# Patient Record
Sex: FEMALE | Race: WHITE | Employment: OTHER | URBAN - METROPOLITAN AREA
[De-identification: names, ages, dates, MRNs, and addresses within clinical notes are randomized per-mention and may not be internally consistent; named-entity substitution may affect disease eponyms.]

---

## 2017-01-10 RX ORDER — PRAVASTATIN SODIUM 20 MG
TABLET ORAL
Qty: 30 TABLET | Refills: 5 | Status: SHIPPED | OUTPATIENT
Start: 2017-01-10 | End: 2017-09-18 | Stop reason: SDUPTHER

## 2017-02-16 ENCOUNTER — OFFICE VISIT (OUTPATIENT)
Dept: FAMILY MEDICINE CLINIC | Age: 82
End: 2017-02-16

## 2017-02-16 VITALS
WEIGHT: 155 LBS | BODY MASS INDEX: 28.52 KG/M2 | TEMPERATURE: 97.6 F | HEART RATE: 85 BPM | OXYGEN SATURATION: 96 % | SYSTOLIC BLOOD PRESSURE: 116 MMHG | DIASTOLIC BLOOD PRESSURE: 64 MMHG | HEIGHT: 62 IN

## 2017-02-16 DIAGNOSIS — K21.9 GERD WITHOUT ESOPHAGITIS: ICD-10-CM

## 2017-02-16 DIAGNOSIS — J01.30 ACUTE NON-RECURRENT SPHENOIDAL SINUSITIS: ICD-10-CM

## 2017-02-16 DIAGNOSIS — D62 ACUTE BLOOD LOSS ANEMIA: ICD-10-CM

## 2017-02-16 DIAGNOSIS — L25.9 CONTACT DERMATITIS, UNSPECIFIED CONTACT DERMATITIS TYPE, UNSPECIFIED TRIGGER: ICD-10-CM

## 2017-02-16 DIAGNOSIS — I25.10 ATHEROSCLEROSIS OF NATIVE CORONARY ARTERY OF NATIVE HEART WITHOUT ANGINA PECTORIS: Primary | ICD-10-CM

## 2017-02-16 DIAGNOSIS — M15.9 PRIMARY OSTEOARTHRITIS INVOLVING MULTIPLE JOINTS: ICD-10-CM

## 2017-02-16 DIAGNOSIS — I48.20 CHRONIC ATRIAL FIBRILLATION (HCC): ICD-10-CM

## 2017-02-16 DIAGNOSIS — E78.00 PURE HYPERCHOLESTEROLEMIA: ICD-10-CM

## 2017-02-16 LAB
ANION GAP SERPL CALCULATED.3IONS-SCNC: 14 MMOL/L (ref 3–16)
BASOPHILS ABSOLUTE: 0 K/UL (ref 0–0.2)
BASOPHILS RELATIVE PERCENT: 1 %
BUN BLDV-MCNC: 16 MG/DL (ref 7–20)
CALCIUM SERPL-MCNC: 9.1 MG/DL (ref 8.3–10.6)
CHLORIDE BLD-SCNC: 102 MMOL/L (ref 99–110)
CHOLESTEROL, TOTAL: 137 MG/DL (ref 0–199)
CO2: 26 MMOL/L (ref 21–32)
CREAT SERPL-MCNC: 0.8 MG/DL (ref 0.6–1.2)
EOSINOPHILS ABSOLUTE: 0.2 K/UL (ref 0–0.6)
EOSINOPHILS RELATIVE PERCENT: 4 %
GFR AFRICAN AMERICAN: >60
GFR NON-AFRICAN AMERICAN: >60
GLUCOSE BLD-MCNC: 105 MG/DL (ref 70–99)
HCT VFR BLD CALC: 39.4 % (ref 36–48)
HDLC SERPL-MCNC: 73 MG/DL (ref 40–60)
HEMOGLOBIN: 12.8 G/DL (ref 12–16)
LDL CHOLESTEROL CALCULATED: 51 MG/DL
LYMPHOCYTES ABSOLUTE: 0.6 K/UL (ref 1–5.1)
LYMPHOCYTES RELATIVE PERCENT: 13.3 %
MCH RBC QN AUTO: 29.8 PG (ref 26–34)
MCHC RBC AUTO-ENTMCNC: 32.3 G/DL (ref 31–36)
MCV RBC AUTO: 92.2 FL (ref 80–100)
MONOCYTES ABSOLUTE: 0.4 K/UL (ref 0–1.3)
MONOCYTES RELATIVE PERCENT: 8.9 %
NEUTROPHILS ABSOLUTE: 3.6 K/UL (ref 1.7–7.7)
NEUTROPHILS RELATIVE PERCENT: 72.8 %
PDW BLD-RTO: 14.9 % (ref 12.4–15.4)
PLATELET # BLD: 174 K/UL (ref 135–450)
PMV BLD AUTO: 7.7 FL (ref 5–10.5)
POTASSIUM SERPL-SCNC: 4.2 MMOL/L (ref 3.5–5.1)
RBC # BLD: 4.27 M/UL (ref 4–5.2)
SODIUM BLD-SCNC: 142 MMOL/L (ref 136–145)
TRIGL SERPL-MCNC: 65 MG/DL (ref 0–150)
VLDLC SERPL CALC-MCNC: 13 MG/DL
WBC # BLD: 4.9 K/UL (ref 4–11)

## 2017-02-16 PROCEDURE — G8598 ASA/ANTIPLAT THER USED: HCPCS | Performed by: FAMILY MEDICINE

## 2017-02-16 PROCEDURE — 4040F PNEUMOC VAC/ADMIN/RCVD: CPT | Performed by: FAMILY MEDICINE

## 2017-02-16 PROCEDURE — 1036F TOBACCO NON-USER: CPT | Performed by: FAMILY MEDICINE

## 2017-02-16 PROCEDURE — 36415 COLL VENOUS BLD VENIPUNCTURE: CPT | Performed by: FAMILY MEDICINE

## 2017-02-16 PROCEDURE — G8484 FLU IMMUNIZE NO ADMIN: HCPCS | Performed by: FAMILY MEDICINE

## 2017-02-16 PROCEDURE — 1090F PRES/ABSN URINE INCON ASSESS: CPT | Performed by: FAMILY MEDICINE

## 2017-02-16 PROCEDURE — 1123F ACP DISCUSS/DSCN MKR DOCD: CPT | Performed by: FAMILY MEDICINE

## 2017-02-16 PROCEDURE — 99214 OFFICE O/P EST MOD 30 MIN: CPT | Performed by: FAMILY MEDICINE

## 2017-02-16 PROCEDURE — G8420 CALC BMI NORM PARAMETERS: HCPCS | Performed by: FAMILY MEDICINE

## 2017-02-16 PROCEDURE — G8427 DOCREV CUR MEDS BY ELIG CLIN: HCPCS | Performed by: FAMILY MEDICINE

## 2017-02-16 RX ORDER — AZITHROMYCIN 250 MG/1
TABLET, FILM COATED ORAL
Qty: 1 PACKET | Refills: 0 | Status: SHIPPED | OUTPATIENT
Start: 2017-02-16 | End: 2017-02-26

## 2017-02-16 RX ORDER — MOMETASONE FUROATE 1 MG/G
CREAM TOPICAL
Qty: 30 G | Refills: 2 | Status: SHIPPED | OUTPATIENT
Start: 2017-02-16 | End: 2017-10-18 | Stop reason: ALTCHOICE

## 2017-02-16 RX ORDER — M-VIT,TX,IRON,MINS/CALC/FOLIC 27MG-0.4MG
1 TABLET ORAL DAILY
COMMUNITY
End: 2017-06-19

## 2017-03-27 RX ORDER — OMEPRAZOLE 20 MG/1
CAPSULE, DELAYED RELEASE ORAL
Qty: 60 CAPSULE | Refills: 5 | Status: SHIPPED | OUTPATIENT
Start: 2017-03-27 | End: 2017-10-26

## 2017-03-29 ENCOUNTER — OFFICE VISIT (OUTPATIENT)
Dept: CARDIOLOGY CLINIC | Age: 82
End: 2017-03-29

## 2017-03-29 ENCOUNTER — OFFICE VISIT (OUTPATIENT)
Dept: ORTHOPEDIC SURGERY | Age: 82
End: 2017-03-29

## 2017-03-29 VITALS
HEIGHT: 62 IN | OXYGEN SATURATION: 95 % | DIASTOLIC BLOOD PRESSURE: 74 MMHG | BODY MASS INDEX: 28.71 KG/M2 | SYSTOLIC BLOOD PRESSURE: 116 MMHG | WEIGHT: 156 LBS | HEART RATE: 101 BPM

## 2017-03-29 VITALS
DIASTOLIC BLOOD PRESSURE: 70 MMHG | WEIGHT: 154.98 LBS | SYSTOLIC BLOOD PRESSURE: 112 MMHG | HEIGHT: 62 IN | BODY MASS INDEX: 28.52 KG/M2 | HEART RATE: 69 BPM

## 2017-03-29 DIAGNOSIS — I48.20 CHRONIC ATRIAL FIBRILLATION (HCC): Primary | ICD-10-CM

## 2017-03-29 DIAGNOSIS — M17.11 PRIMARY OSTEOARTHRITIS OF RIGHT KNEE: Primary | ICD-10-CM

## 2017-03-29 DIAGNOSIS — G89.29 CHRONIC PAIN OF RIGHT KNEE: ICD-10-CM

## 2017-03-29 DIAGNOSIS — E78.00 PURE HYPERCHOLESTEROLEMIA: ICD-10-CM

## 2017-03-29 DIAGNOSIS — M25.561 CHRONIC PAIN OF RIGHT KNEE: ICD-10-CM

## 2017-03-29 PROCEDURE — 1090F PRES/ABSN URINE INCON ASSESS: CPT | Performed by: INTERNAL MEDICINE

## 2017-03-29 PROCEDURE — G8427 DOCREV CUR MEDS BY ELIG CLIN: HCPCS | Performed by: ORTHOPAEDIC SURGERY

## 2017-03-29 PROCEDURE — 4040F PNEUMOC VAC/ADMIN/RCVD: CPT | Performed by: ORTHOPAEDIC SURGERY

## 2017-03-29 PROCEDURE — 1123F ACP DISCUSS/DSCN MKR DOCD: CPT | Performed by: ORTHOPAEDIC SURGERY

## 2017-03-29 PROCEDURE — 99213 OFFICE O/P EST LOW 20 MIN: CPT | Performed by: ORTHOPAEDIC SURGERY

## 2017-03-29 PROCEDURE — 1036F TOBACCO NON-USER: CPT | Performed by: INTERNAL MEDICINE

## 2017-03-29 PROCEDURE — G8484 FLU IMMUNIZE NO ADMIN: HCPCS | Performed by: INTERNAL MEDICINE

## 2017-03-29 PROCEDURE — 1123F ACP DISCUSS/DSCN MKR DOCD: CPT | Performed by: INTERNAL MEDICINE

## 2017-03-29 PROCEDURE — G8420 CALC BMI NORM PARAMETERS: HCPCS | Performed by: ORTHOPAEDIC SURGERY

## 2017-03-29 PROCEDURE — G8427 DOCREV CUR MEDS BY ELIG CLIN: HCPCS | Performed by: INTERNAL MEDICINE

## 2017-03-29 PROCEDURE — G8420 CALC BMI NORM PARAMETERS: HCPCS | Performed by: INTERNAL MEDICINE

## 2017-03-29 PROCEDURE — 20610 DRAIN/INJ JOINT/BURSA W/O US: CPT | Performed by: ORTHOPAEDIC SURGERY

## 2017-03-29 PROCEDURE — 1090F PRES/ABSN URINE INCON ASSESS: CPT | Performed by: ORTHOPAEDIC SURGERY

## 2017-03-29 PROCEDURE — 99214 OFFICE O/P EST MOD 30 MIN: CPT | Performed by: INTERNAL MEDICINE

## 2017-03-29 PROCEDURE — 1036F TOBACCO NON-USER: CPT | Performed by: ORTHOPAEDIC SURGERY

## 2017-03-29 PROCEDURE — G8598 ASA/ANTIPLAT THER USED: HCPCS | Performed by: INTERNAL MEDICINE

## 2017-03-29 PROCEDURE — G8484 FLU IMMUNIZE NO ADMIN: HCPCS | Performed by: ORTHOPAEDIC SURGERY

## 2017-03-29 PROCEDURE — 4040F PNEUMOC VAC/ADMIN/RCVD: CPT | Performed by: INTERNAL MEDICINE

## 2017-03-29 PROCEDURE — G8598 ASA/ANTIPLAT THER USED: HCPCS | Performed by: ORTHOPAEDIC SURGERY

## 2017-05-01 ENCOUNTER — TELEPHONE (OUTPATIENT)
Dept: CARDIOLOGY CLINIC | Age: 82
End: 2017-05-01

## 2017-06-12 ENCOUNTER — TELEPHONE (OUTPATIENT)
Dept: CARDIOLOGY CLINIC | Age: 82
End: 2017-06-12

## 2017-06-21 ENCOUNTER — OFFICE VISIT (OUTPATIENT)
Dept: FAMILY MEDICINE CLINIC | Age: 82
End: 2017-06-21

## 2017-06-21 VITALS
HEIGHT: 62 IN | BODY MASS INDEX: 28.89 KG/M2 | HEART RATE: 66 BPM | WEIGHT: 157 LBS | OXYGEN SATURATION: 98 % | SYSTOLIC BLOOD PRESSURE: 118 MMHG | TEMPERATURE: 97.7 F | DIASTOLIC BLOOD PRESSURE: 66 MMHG

## 2017-06-21 DIAGNOSIS — I48.20 CHRONIC ATRIAL FIBRILLATION (HCC): Primary | ICD-10-CM

## 2017-06-21 DIAGNOSIS — J43.1 PANLOBULAR EMPHYSEMA (HCC): ICD-10-CM

## 2017-06-21 DIAGNOSIS — I25.10 ATHEROSCLEROSIS OF NATIVE CORONARY ARTERY OF NATIVE HEART WITHOUT ANGINA PECTORIS: ICD-10-CM

## 2017-06-21 DIAGNOSIS — M15.9 PRIMARY OSTEOARTHRITIS INVOLVING MULTIPLE JOINTS: ICD-10-CM

## 2017-06-21 DIAGNOSIS — K21.9 GERD WITHOUT ESOPHAGITIS: ICD-10-CM

## 2017-06-21 LAB
ANION GAP SERPL CALCULATED.3IONS-SCNC: 14 MMOL/L (ref 3–16)
BASOPHILS ABSOLUTE: 0 K/UL (ref 0–0.2)
BASOPHILS RELATIVE PERCENT: 1.1 %
BUN BLDV-MCNC: 15 MG/DL (ref 7–20)
CALCIUM SERPL-MCNC: 9 MG/DL (ref 8.3–10.6)
CHLORIDE BLD-SCNC: 103 MMOL/L (ref 99–110)
CO2: 27 MMOL/L (ref 21–32)
CREAT SERPL-MCNC: 0.7 MG/DL (ref 0.6–1.2)
EOSINOPHILS ABSOLUTE: 0.2 K/UL (ref 0–0.6)
EOSINOPHILS RELATIVE PERCENT: 5.9 %
GFR AFRICAN AMERICAN: >60
GFR NON-AFRICAN AMERICAN: >60
GLUCOSE BLD-MCNC: 96 MG/DL (ref 70–99)
HCT VFR BLD CALC: 37.7 % (ref 36–48)
HEMOGLOBIN: 12.1 G/DL (ref 12–16)
LYMPHOCYTES ABSOLUTE: 0.6 K/UL (ref 1–5.1)
LYMPHOCYTES RELATIVE PERCENT: 14.7 %
MCH RBC QN AUTO: 29.2 PG (ref 26–34)
MCHC RBC AUTO-ENTMCNC: 32.1 G/DL (ref 31–36)
MCV RBC AUTO: 91.1 FL (ref 80–100)
MONOCYTES ABSOLUTE: 0.4 K/UL (ref 0–1.3)
MONOCYTES RELATIVE PERCENT: 9 %
NEUTROPHILS ABSOLUTE: 2.9 K/UL (ref 1.7–7.7)
NEUTROPHILS RELATIVE PERCENT: 69.3 %
PDW BLD-RTO: 14 % (ref 12.4–15.4)
PLATELET # BLD: 186 K/UL (ref 135–450)
PMV BLD AUTO: 8.1 FL (ref 5–10.5)
POTASSIUM SERPL-SCNC: 4.2 MMOL/L (ref 3.5–5.1)
RBC # BLD: 4.14 M/UL (ref 4–5.2)
SODIUM BLD-SCNC: 144 MMOL/L (ref 136–145)
TSH REFLEX: 1.64 UIU/ML (ref 0.27–4.2)
WBC # BLD: 4.2 K/UL (ref 4–11)

## 2017-06-21 PROCEDURE — G8598 ASA/ANTIPLAT THER USED: HCPCS | Performed by: FAMILY MEDICINE

## 2017-06-21 PROCEDURE — 99214 OFFICE O/P EST MOD 30 MIN: CPT | Performed by: FAMILY MEDICINE

## 2017-06-21 PROCEDURE — 1036F TOBACCO NON-USER: CPT | Performed by: FAMILY MEDICINE

## 2017-06-21 PROCEDURE — 1090F PRES/ABSN URINE INCON ASSESS: CPT | Performed by: FAMILY MEDICINE

## 2017-06-21 PROCEDURE — G8427 DOCREV CUR MEDS BY ELIG CLIN: HCPCS | Performed by: FAMILY MEDICINE

## 2017-06-21 PROCEDURE — 1123F ACP DISCUSS/DSCN MKR DOCD: CPT | Performed by: FAMILY MEDICINE

## 2017-06-21 PROCEDURE — 4040F PNEUMOC VAC/ADMIN/RCVD: CPT | Performed by: FAMILY MEDICINE

## 2017-06-21 PROCEDURE — G8926 SPIRO NO PERF OR DOC: HCPCS | Performed by: FAMILY MEDICINE

## 2017-06-21 PROCEDURE — 36415 COLL VENOUS BLD VENIPUNCTURE: CPT | Performed by: FAMILY MEDICINE

## 2017-06-21 PROCEDURE — 3023F SPIROM DOC REV: CPT | Performed by: FAMILY MEDICINE

## 2017-06-21 PROCEDURE — G8419 CALC BMI OUT NRM PARAM NOF/U: HCPCS | Performed by: FAMILY MEDICINE

## 2017-07-24 ENCOUNTER — OFFICE VISIT (OUTPATIENT)
Dept: FAMILY MEDICINE CLINIC | Age: 82
End: 2017-07-24

## 2017-07-24 VITALS
DIASTOLIC BLOOD PRESSURE: 64 MMHG | BODY MASS INDEX: 29.63 KG/M2 | OXYGEN SATURATION: 95 % | HEART RATE: 72 BPM | SYSTOLIC BLOOD PRESSURE: 116 MMHG | TEMPERATURE: 98.1 F | WEIGHT: 162 LBS

## 2017-07-24 DIAGNOSIS — R82.81 PYURIA: ICD-10-CM

## 2017-07-24 DIAGNOSIS — R10.9 FLANK PAIN: Primary | ICD-10-CM

## 2017-07-24 LAB
BILIRUBIN, POC: NEGATIVE
BLOOD URINE, POC: ABNORMAL
CLARITY, POC: CLEAR
COLOR, POC: ABNORMAL
GLUCOSE URINE, POC: NEGATIVE
KETONES, POC: NEGATIVE
LEUKOCYTE EST, POC: ABNORMAL
NITRITE, POC: NEGATIVE
PH, POC: 7
PROTEIN, POC: NEGATIVE
SPECIFIC GRAVITY, POC: 1.01
UROBILINOGEN, POC: 1

## 2017-07-24 PROCEDURE — G8427 DOCREV CUR MEDS BY ELIG CLIN: HCPCS | Performed by: FAMILY MEDICINE

## 2017-07-24 PROCEDURE — 1123F ACP DISCUSS/DSCN MKR DOCD: CPT | Performed by: FAMILY MEDICINE

## 2017-07-24 PROCEDURE — 99213 OFFICE O/P EST LOW 20 MIN: CPT | Performed by: FAMILY MEDICINE

## 2017-07-24 PROCEDURE — 81003 URINALYSIS AUTO W/O SCOPE: CPT | Performed by: FAMILY MEDICINE

## 2017-07-24 PROCEDURE — 1036F TOBACCO NON-USER: CPT | Performed by: FAMILY MEDICINE

## 2017-07-24 PROCEDURE — 4040F PNEUMOC VAC/ADMIN/RCVD: CPT | Performed by: FAMILY MEDICINE

## 2017-07-24 PROCEDURE — G8598 ASA/ANTIPLAT THER USED: HCPCS | Performed by: FAMILY MEDICINE

## 2017-07-24 PROCEDURE — G8419 CALC BMI OUT NRM PARAM NOF/U: HCPCS | Performed by: FAMILY MEDICINE

## 2017-07-24 PROCEDURE — 1090F PRES/ABSN URINE INCON ASSESS: CPT | Performed by: FAMILY MEDICINE

## 2017-07-24 RX ORDER — TIZANIDINE 4 MG/1
2-4 TABLET ORAL EVERY 6 HOURS PRN
Qty: 40 TABLET | Refills: 1 | Status: SHIPPED | OUTPATIENT
Start: 2017-07-24 | End: 2017-10-19 | Stop reason: ALTCHOICE

## 2017-07-25 ENCOUNTER — TELEPHONE (OUTPATIENT)
Dept: CARDIOLOGY CLINIC | Age: 82
End: 2017-07-25

## 2017-07-26 ENCOUNTER — TELEPHONE (OUTPATIENT)
Dept: FAMILY MEDICINE CLINIC | Age: 82
End: 2017-07-26

## 2017-07-26 LAB — URINE CULTURE, ROUTINE: NORMAL

## 2017-08-02 RX ORDER — NITROFURANTOIN 25; 75 MG/1; MG/1
100 CAPSULE ORAL 2 TIMES DAILY
Qty: 10 CAPSULE | Refills: 0 | Status: SHIPPED | OUTPATIENT
Start: 2017-08-02 | End: 2017-08-07

## 2017-08-09 ENCOUNTER — TELEPHONE (OUTPATIENT)
Dept: FAMILY MEDICINE CLINIC | Age: 82
End: 2017-08-09

## 2017-08-10 ENCOUNTER — NURSE ONLY (OUTPATIENT)
Dept: FAMILY MEDICINE CLINIC | Age: 82
End: 2017-08-10

## 2017-08-10 DIAGNOSIS — R32 URINARY INCONTINENCE, UNSPECIFIED TYPE: Primary | ICD-10-CM

## 2017-08-10 LAB
BILIRUBIN, POC: ABNORMAL
BLOOD URINE, POC: ABNORMAL
CLARITY, POC: CLEAR
COLOR, POC: ABNORMAL
GLUCOSE URINE, POC: ABNORMAL
KETONES, POC: ABNORMAL
LEUKOCYTE EST, POC: ABNORMAL
NITRITE, POC: ABNORMAL
PH, POC: 7
PROTEIN, POC: ABNORMAL
SPECIFIC GRAVITY, POC: 1.01
UROBILINOGEN, POC: 0.2

## 2017-08-10 PROCEDURE — 81002 URINALYSIS NONAUTO W/O SCOPE: CPT | Performed by: FAMILY MEDICINE

## 2017-08-10 RX ORDER — CIPROFLOXACIN 250 MG/1
250 TABLET, FILM COATED ORAL 2 TIMES DAILY
Qty: 14 TABLET | Refills: 0 | Status: SHIPPED | OUTPATIENT
Start: 2017-08-10 | End: 2017-08-17

## 2017-08-12 LAB — URINE CULTURE, ROUTINE: NORMAL

## 2017-08-30 ENCOUNTER — TELEPHONE (OUTPATIENT)
Dept: FAMILY MEDICINE CLINIC | Age: 82
End: 2017-08-30

## 2017-09-11 RX ORDER — FUROSEMIDE 20 MG/1
TABLET ORAL
Qty: 60 TABLET | Refills: 3 | Status: SHIPPED | OUTPATIENT
Start: 2017-09-11 | End: 2018-05-02 | Stop reason: SDUPTHER

## 2017-09-18 RX ORDER — PRAVASTATIN SODIUM 20 MG
TABLET ORAL
Qty: 30 TABLET | Refills: 5 | Status: SHIPPED | OUTPATIENT
Start: 2017-09-18 | End: 2018-05-02 | Stop reason: SDUPTHER

## 2017-10-18 ENCOUNTER — OFFICE VISIT (OUTPATIENT)
Dept: CARDIOLOGY CLINIC | Age: 82
End: 2017-10-18

## 2017-10-18 VITALS
HEIGHT: 62 IN | BODY MASS INDEX: 28.34 KG/M2 | DIASTOLIC BLOOD PRESSURE: 72 MMHG | HEART RATE: 80 BPM | OXYGEN SATURATION: 95 % | SYSTOLIC BLOOD PRESSURE: 110 MMHG | WEIGHT: 154 LBS

## 2017-10-18 DIAGNOSIS — I48.20 CHRONIC ATRIAL FIBRILLATION (HCC): ICD-10-CM

## 2017-10-18 DIAGNOSIS — I25.10 ATHEROSCLEROSIS OF NATIVE CORONARY ARTERY OF NATIVE HEART WITHOUT ANGINA PECTORIS: Primary | ICD-10-CM

## 2017-10-18 DIAGNOSIS — E78.00 PURE HYPERCHOLESTEROLEMIA: ICD-10-CM

## 2017-10-18 PROCEDURE — 1090F PRES/ABSN URINE INCON ASSESS: CPT | Performed by: INTERNAL MEDICINE

## 2017-10-18 PROCEDURE — G8417 CALC BMI ABV UP PARAM F/U: HCPCS | Performed by: INTERNAL MEDICINE

## 2017-10-18 PROCEDURE — G8484 FLU IMMUNIZE NO ADMIN: HCPCS | Performed by: INTERNAL MEDICINE

## 2017-10-18 PROCEDURE — 1036F TOBACCO NON-USER: CPT | Performed by: INTERNAL MEDICINE

## 2017-10-18 PROCEDURE — G8427 DOCREV CUR MEDS BY ELIG CLIN: HCPCS | Performed by: INTERNAL MEDICINE

## 2017-10-18 PROCEDURE — 4040F PNEUMOC VAC/ADMIN/RCVD: CPT | Performed by: INTERNAL MEDICINE

## 2017-10-18 PROCEDURE — 1123F ACP DISCUSS/DSCN MKR DOCD: CPT | Performed by: INTERNAL MEDICINE

## 2017-10-18 PROCEDURE — G8598 ASA/ANTIPLAT THER USED: HCPCS | Performed by: INTERNAL MEDICINE

## 2017-10-18 PROCEDURE — 99214 OFFICE O/P EST MOD 30 MIN: CPT | Performed by: INTERNAL MEDICINE

## 2017-10-18 NOTE — LETTER
[address] [facility] - [place]. [address] [facility] [address]  Phone: [phone]  Fax: [phone]    Luz Marina Ayala MD        October 18, 2017     Debra Paula, [address] [address]    Patient: Annamaria Jackson  MR Number: [id number]  YOB: 1926  Date of Visit: 10/18/2017    Dear Dr. Debra Paula:    Thank you for the request for consultation for Sveta Velazquez to me for the evaluation. Below are the relevant portions of my assessment and plan of care. [address]     Outpatient Follow Up Note    Subjective: Follow Up secondary to coronary artery disease, HTN and  Chronic atrial fibrillation  Today, she states she is doing well and denies  MIKE, dizziness, syncope, or edema. Reports to no recent episode of  \"heart flutterings\"   She does all her own chores without difficulty including house work. [place]:   She reports falling and fracturing her left hip back in January 7th 2016 and spent 2 months in rehab. She is currently living at home and she no longer works at [employer]. She is currently looking for a job. She states \"my only income is my social security and I have a lot more bills than that. \"    She has history of stable CAD chronic afib rate controlled anticoagulated on treatment with statins for hyperlipidemia.     12 point ROS negative in all areas as listed below except as in [address]  Constitutional, EENT, Cardiovascular, pulmonary, GI, , Musculoskeletal, skin, neurological, hematological, endocrine, Psychiatric    Past Medical History:   Diagnosis Date    Atherosclerosis of native coronary artery of native heart without angina pectoris     cath 1/12 , 4/12 (occluded RCa with patent collateral flow    Chronic atrial fibrillation ([address] 1/12    Closed left hip fracture ([address] 1/16    THR    DDD (degenerative disc disease), cervical     Emphysema of lung ([address] 2007    mild    GERD without esophagitis 2001    gerd with stricture skin, neurological, hematological, endocrine, Psychiatric      Objective:   PHYSICAL EXAM:        VITALS:    /72   Pulse 80   Ht 5' 2\" (1.575 m)   Wt 154 lb (69.9 kg)   SpO2 95%   BMI 28.17 kg/m²    CONSTITUTIONAL: Cooperative, no apparent distress, and appears well nourished / developed  NEUROLOGIC:  Awake and oriented to person, place and time. PSYCH: Calm affect. SKIN: Warm and dry. HEENT: Sclera non-icteric, normocephalic, neck supple, no elevation of JVP, normal carotid pulses with no bruits and thyroid normal size. LUNGS:  No increased work of breathing and clear to auscultation, no wheezing  CARDIOVASCULAR:  irregular rate, irregular rhythm with no murmurs, gallops, rubs, or abnormal heart sounds, normal PMI. The apical impulses not displaced  JVP less than 8 cm H2O  The carotid upstroke is normal in amplitude and contour without delay or bruit  JVP is not elevated  ABDOMEN:  Normal bowel sounds, non-distended and non-tender to palpation  EXT: no edema, no calf tenderness. Pulses are present bilaterally.     DATA:    Lab Results   Component Value Date    ALT 15 01/07/2016    AST 21 01/07/2016    ALKPHOS 65 01/07/2016    BILITOT 0.4 01/07/2016     Lab Results   Component Value Date    CREATININE 0.7 06/21/2017    BUN 15 06/21/2017     06/21/2017    K 4.2 06/21/2017     06/21/2017    CO2 27 06/21/2017     Lab Results   Component Value Date    TSH 2.03 09/10/2012     Lab Results   Component Value Date    WBC 4.2 06/21/2017    HGB 12.1 06/21/2017    HCT 37.7 06/21/2017    MCV 91.1 06/21/2017     06/21/2017     No components found for: CHLPL  Lab Results   Component Value Date    TRIG 65 02/16/2017    TRIG 91 06/03/2016    TRIG 80 03/19/2015     Lab Results   Component Value Date    HDL 73 (H) 02/16/2017    HDL 63 (H) 06/03/2016    HDL 63 (H) 03/19/2015     Lab Results   Component Value Date    LDLCALC 51 02/16/2017    LDLCALC 45 06/03/2016    LDLCALC 37 03/19/2015     Lab Results Component Value Date    LABVLDL 13 02/16/2017    LABVLDL 18 06/03/2016    LABVLDL 16 03/19/2015 8/25/16 EKG  A Fib - HR 94    CATH 4/24/12  1. Remote, non-dominant right coronary artery occlusion with   collateralization. 2. No significant disease along the left coronary artery. 3. Normal left ventricular systolic function. EF 55%  4. No evidence for aortic dissection. Cath 1/2012. [facility] RCA small nondominant 100%. Nonobstructive LAD/LCx.     ECHO from 4/12 showed Normal LV systolic function. EF 55%. Mild aortic valve sclerosis. Biatrial enlargement. Moderate TR with mildly elevated pulmonary pressure. Mild MR. 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 CAD (coronary artery disease) : stable    2. 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 3.  HLD - well controlled last lipids 2/16/17 see above results       4. Angina pectoris. stable       Plan:     1. Healthy lifestyle education reviewed including nutrition, exercise and activity  2. No med changes warranted today  3. Follow up with me in 6 months  4. Blood work due in February through  Dr Christophe Armstrong  1. Tobacco Cessation Counseling: NA  2. Retake of BP if >140/90:   NA  3. Documentation to PCP/referring for new patient:  Sent to PCP at close of office visit  4. CAD patient on anti-platelet: anticoagulated  5. CAD patient on STATIN therapy:  Yes  6. Patient with CHF and aFib on anticoagulation:  Yes   Grace Veloz MD      If you have questions, please do not hesitate to call me. I look forward to following Selma Kwon along with you.     Sincerely,        [address] Puente MD

## 2017-10-18 NOTE — COMMUNICATION BODY
Eliazar Candelario     Outpatient Follow Up Note    Subjective: Follow Up secondary to coronary artery disease, HTN and  Chronic atrial fibrillation  Today, she states she is doing well and denies  MIKE, dizziness, syncope, or edema. Reports to no recent episode of  \"heart flutterings\"   She does all her own chores without difficulty including house work. [place]:   She reports falling and fracturing her left hip back in January 7th 2016 and spent 2 months in rehab. She is currently living at home and she no longer works at [employer]. She is currently looking for a job. She states \"my only income is my social security and I have a lot more bills than that. \"    She has history of stable CAD chronic afib rate controlled anticoagulated on treatment with statins for hyperlipidemia.     12 point ROS negative in all areas as listed below except as in [address]  Constitutional, EENT, Cardiovascular, pulmonary, GI, , Musculoskeletal, skin, neurological, hematological, endocrine, Psychiatric    Past Medical History:   Diagnosis Date    Atherosclerosis of native coronary artery of native heart without angina pectoris     cath 1/12 , 4/12 (occluded RCa with patent collateral flow    Chronic atrial fibrillation ([address] 1/12    Closed left hip fracture ([address] 1/16    THR    DDD (degenerative disc disease), cervical     Emphysema of lung ([address] 2007    mild    GERD without esophagitis 2001    gerd with stricture    Hyperlipidemia     Osteopenia 8/11    recheck DEXA 2014    Primary osteoarthritis involving multiple joints     Routine health maintenance     TAC 9/99, DEXA 03    Urinary incontinence     urge     Social History:    History   Smoking Status    Former Smoker    Types: Cigarettes   Smokeless Tobacco    Never Used     Comment: quit in 1990   Family history non contributory  On sister living 2 yrs older apparently doing well in [place]  Current Medications:  Current Outpatient Prescriptions   Medication Sig Dispense increased work of breathing and clear to auscultation, no wheezing  CARDIOVASCULAR:  irregular rate, irregular rhythm with no murmurs, gallops, rubs, or abnormal heart sounds, normal PMI. The apical impulses not displaced  JVP less than 8 cm H2O  The carotid upstroke is normal in amplitude and contour without delay or bruit  JVP is not elevated  ABDOMEN:  Normal bowel sounds, non-distended and non-tender to palpation  EXT: no edema, no calf tenderness. Pulses are present bilaterally. DATA:    Lab Results   Component Value Date    ALT 15 01/07/2016    AST 21 01/07/2016    ALKPHOS 65 01/07/2016    BILITOT 0.4 01/07/2016     Lab Results   Component Value Date    CREATININE 0.7 06/21/2017    BUN 15 06/21/2017     06/21/2017    K 4.2 06/21/2017     06/21/2017    CO2 27 06/21/2017     Lab Results   Component Value Date    TSH 2.03 09/10/2012     Lab Results   Component Value Date    WBC 4.2 06/21/2017    HGB 12.1 06/21/2017    HCT 37.7 06/21/2017    MCV 91.1 06/21/2017     06/21/2017     No components found for: CHLPL  Lab Results   Component Value Date    TRIG 65 02/16/2017    TRIG 91 06/03/2016    TRIG 80 03/19/2015     Lab Results   Component Value Date    HDL 73 (H) 02/16/2017    HDL 63 (H) 06/03/2016    HDL 63 (H) 03/19/2015     Lab Results   Component Value Date    LDLCALC 51 02/16/2017    LDLCALC 45 06/03/2016    LDLCALC 37 03/19/2015     Lab Results   Component Value Date    LABVLDL 13 02/16/2017    LABVLDL 18 06/03/2016    LABVLDL 16 03/19/2015 8/25/16 EKG  A Fib - HR 94    CATH 4/24/12  1. Remote, non-dominant right coronary artery occlusion with   collateralization. 2. No significant disease along the left coronary artery. 3. Normal left ventricular systolic function. EF 55%  4. No evidence for aortic dissection. Cath 1/2012. [facility] RCA small nondominant 100%. Nonobstructive LAD/LCx.     ECHO from 4/12 showed Normal LV systolic function. EF 55%.  Mild aortic valve sclerosis. Biatrial enlargement. Moderate TR with mildly elevated pulmonary pressure. Mild MR. 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 CAD (coronary artery disease) : stable    2. 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 3.  HLD - well controlled last lipids 2/16/17 see above results       4. Angina pectoris. stable       Plan:     1. Healthy lifestyle education reviewed including nutrition, exercise and activity  2. No med changes warranted today  3. Follow up with me in 6 months  4. Blood work due in February through  Dr Moy Regalado  1. Tobacco Cessation Counseling: NA  2. Retake of BP if >140/90:   NA  3. Documentation to PCP/referring for new patient:  Sent to PCP at close of office visit  4. CAD patient on anti-platelet: anticoagulated  5. CAD patient on STATIN therapy:  Yes  6.  Patient with CHF and aFib on anticoagulation:  Yes   Michelle Boswell MD

## 2017-10-19 ENCOUNTER — TELEPHONE (OUTPATIENT)
Dept: PHARMACY | Facility: CLINIC | Age: 82
End: 2017-10-19

## 2017-10-19 NOTE — TELEPHONE ENCOUNTER
CLINICAL PHARMACY NOTE - Medication Review    Susan Frost is a 80 y.o. female referred to a clinical pharmacy specialist given history of COPD and/or HF and number of home medications. Attempt made to reach patient by telephone for medication review. Left voice message on home & mobile #s for patient to return call. Will continue to attempt to contact patient by telephone as appropriate.     Jillian Wren, PharmD, [address]  Direct: [phone]  Department, toll free: [phone], option 7

## 2017-10-26 ENCOUNTER — OFFICE VISIT (OUTPATIENT)
Dept: FAMILY MEDICINE CLINIC | Age: 82
End: 2017-10-26

## 2017-10-26 VITALS
OXYGEN SATURATION: 95 % | HEART RATE: 52 BPM | BODY MASS INDEX: 28.17 KG/M2 | TEMPERATURE: 97.6 F | WEIGHT: 154 LBS | SYSTOLIC BLOOD PRESSURE: 110 MMHG | DIASTOLIC BLOOD PRESSURE: 64 MMHG

## 2017-10-26 DIAGNOSIS — K21.9 GERD WITHOUT ESOPHAGITIS: ICD-10-CM

## 2017-10-26 DIAGNOSIS — I25.10 ATHEROSCLEROSIS OF NATIVE CORONARY ARTERY OF NATIVE HEART WITHOUT ANGINA PECTORIS: Primary | ICD-10-CM

## 2017-10-26 DIAGNOSIS — M15.9 PRIMARY OSTEOARTHRITIS INVOLVING MULTIPLE JOINTS: ICD-10-CM

## 2017-10-26 DIAGNOSIS — I48.20 CHRONIC ATRIAL FIBRILLATION (HCC): ICD-10-CM

## 2017-10-26 PROCEDURE — 1123F ACP DISCUSS/DSCN MKR DOCD: CPT | Performed by: FAMILY MEDICINE

## 2017-10-26 PROCEDURE — 90688 IIV4 VACCINE SPLT 0.5 ML IM: CPT | Performed by: FAMILY MEDICINE

## 2017-10-26 PROCEDURE — G8598 ASA/ANTIPLAT THER USED: HCPCS | Performed by: FAMILY MEDICINE

## 2017-10-26 PROCEDURE — G0008 ADMIN INFLUENZA VIRUS VAC: HCPCS | Performed by: FAMILY MEDICINE

## 2017-10-26 PROCEDURE — 1090F PRES/ABSN URINE INCON ASSESS: CPT | Performed by: FAMILY MEDICINE

## 2017-10-26 PROCEDURE — 4040F PNEUMOC VAC/ADMIN/RCVD: CPT | Performed by: FAMILY MEDICINE

## 2017-10-26 PROCEDURE — G8427 DOCREV CUR MEDS BY ELIG CLIN: HCPCS | Performed by: FAMILY MEDICINE

## 2017-10-26 PROCEDURE — 1036F TOBACCO NON-USER: CPT | Performed by: FAMILY MEDICINE

## 2017-10-26 PROCEDURE — 99214 OFFICE O/P EST MOD 30 MIN: CPT | Performed by: FAMILY MEDICINE

## 2017-10-26 PROCEDURE — G8484 FLU IMMUNIZE NO ADMIN: HCPCS | Performed by: FAMILY MEDICINE

## 2017-10-26 PROCEDURE — G8417 CALC BMI ABV UP PARAM F/U: HCPCS | Performed by: FAMILY MEDICINE

## 2017-10-26 RX ORDER — NICOTINE POLACRILEX 4 MG/1
20 GUM, CHEWING ORAL DAILY
Qty: 1 TABLET | Refills: 0
Start: 2017-10-26 | End: 2018-06-12 | Stop reason: SDUPTHER

## 2017-10-26 NOTE — PATIENT INSTRUCTIONS
Please read the healthy family handout that you were given and share it with your family. Please compare this printed medication list with your medications at home to be sure they are the same. If you have any medications that are different please contact us immediately at [phone]. Also review your allergies that we have listed, these may also include medications that you have not been able to tolerate, make sure everything listed is correct. If you have any allergies that are different please contact us immediately at [phone].

## 2017-10-26 NOTE — PROGRESS NOTES
wheezing - no                       dyspnea on exertion - no            unusual cough - no    Objective  /64   Pulse 52   Temp 97.6 °F (36.4 °C) (Oral)   Wt 154 lb (69.9 kg)   SpO2 95%   BMI 28.17 kg/m²   Patient is alert, oriented, and in no acute distress  Psych:  mood and affect are unremarkable               speech and thought processes appear intact               Behavior and appearance unremarkable  Neck:  No masses, trachea midline, phonation normal   Thyroid - unremarkable              Cervical  adenopathy - nothing significant  Chest:  No deformity of thorax               Respirations easy and unlabored              Lungs - clear to auscultation     Breath sounds - equal  Heart:   Rhythm - regular              Murmur - none   Gallop - none               Pretibial pitting edema - none    Reg Nieto MD    Outpatient Prescriptions Marked as Taking for the 10/26/17 encounter (Office Visit) with Reg Nieto MD   Medication Sig Dispense Refill    omeprazole (RA OMEPRAZOLE) 20 MG EC tablet Take 1 tablet by mouth daily Take 30-60 minutes before largest meal 1 tablet 0    pravastatin (PRAVACHOL) 20 MG tablet TAKE 1 TABLET ONCE NIGHTLY 30 tablet 5    furosemide (LASIX) 20 MG tablet TAKE 1 TO 2 TABLETS DAILY AS NEEDED FOR SWELLING 60 tablet 3    rivaroxaban (XARELTO) 20 MG TABS tablet Take 20 mg by mouth daily Indications: 5-2-17 SAMPLES--LOT # [id number] EXP 8-19 # 35      metoprolol tartrate (LOPRESSOR) 25 MG tablet Take 0.5 tablets by mouth 2 times daily 30 tablet 5    diltiazem (CARDIZEM) 30 MG tablet TAKE 1 TABLET TWICE A DAY 60 tablet 5    famotidine (PEPCID) 20 MG tablet TAKE 1 TABLET TWICE A DAY 60 tablet 5    oxybutynin (DITROPAN) 5 MG tablet TAKE 1 TABLET THREE TIMES DAILY AS NEEDED 90 tablet 5    NITROSTAT 0.4 MG SL tablet USE 1 TABLET UNDER TONGUE AS NEEDED FOR CHEST PAIN MAY REPEAT EVERY 5M IN. UP TO 3.  THEN GO TO ER 25 tablet 2    Cholecalciferol (VITAMIN D3) 1000 UNITS TABS Take 400 Units by mouth daily. The note was completed using Dragon voice recognition transcription. Every effort was made to ensure accuracy; however, inadvertent  transcription errors may be present despite my best efforts to edit errors.

## 2017-11-06 RX ORDER — OXYBUTYNIN CHLORIDE 5 MG/1
TABLET ORAL
Qty: 90 TABLET | Refills: 5 | Status: SHIPPED | OUTPATIENT
Start: 2017-11-06 | End: 2018-03-01 | Stop reason: SDUPTHER

## 2017-12-29 ENCOUNTER — TELEPHONE (OUTPATIENT)
Dept: FAMILY MEDICINE CLINIC | Age: 82
End: 2017-12-29

## 2017-12-29 RX ORDER — AZITHROMYCIN 250 MG/1
TABLET, FILM COATED ORAL
Qty: 1 PACKET | Refills: 0 | Status: SHIPPED | OUTPATIENT
Start: 2017-12-29 | End: 2018-01-08

## 2018-01-02 ENCOUNTER — TELEPHONE (OUTPATIENT)
Dept: FAMILY MEDICINE CLINIC | Age: 83
End: 2018-01-02

## 2018-01-02 RX ORDER — DOXYCYCLINE HYCLATE 100 MG
100 TABLET ORAL 2 TIMES DAILY
Qty: 20 TABLET | Refills: 0 | Status: SHIPPED | OUTPATIENT
Start: 2018-01-02 | End: 2018-01-11 | Stop reason: SDUPTHER

## 2018-01-02 NOTE — TELEPHONE ENCOUNTER
Patient called in on 12/29 with symptoms of cough and chest congestion, she was prescribed zpak.   She took last dose today but said she doesn't feel any better and wanted to see if you would prescribe another round of abx for her

## 2018-01-11 ENCOUNTER — TELEPHONE (OUTPATIENT)
Dept: FAMILY MEDICINE CLINIC | Age: 83
End: 2018-01-11

## 2018-01-11 RX ORDER — DOXYCYCLINE HYCLATE 100 MG
100 TABLET ORAL 2 TIMES DAILY
Qty: 20 TABLET | Refills: 0 | Status: SHIPPED | OUTPATIENT
Start: 2018-01-11 | End: 2018-01-21

## 2018-01-30 RX ORDER — FAMOTIDINE 20 MG/1
TABLET, FILM COATED ORAL
Qty: 60 TABLET | Refills: 5 | Status: SHIPPED | OUTPATIENT
Start: 2018-01-30 | End: 2018-11-12 | Stop reason: SDUPTHER

## 2018-03-01 ENCOUNTER — OFFICE VISIT (OUTPATIENT)
Dept: FAMILY MEDICINE CLINIC | Age: 83
End: 2018-03-01

## 2018-03-01 VITALS
HEART RATE: 74 BPM | WEIGHT: 152 LBS | OXYGEN SATURATION: 96 % | SYSTOLIC BLOOD PRESSURE: 108 MMHG | DIASTOLIC BLOOD PRESSURE: 64 MMHG | HEIGHT: 62 IN | BODY MASS INDEX: 27.97 KG/M2 | TEMPERATURE: 97.6 F

## 2018-03-01 DIAGNOSIS — J43.1 PANLOBULAR EMPHYSEMA (HCC): ICD-10-CM

## 2018-03-01 DIAGNOSIS — M15.9 PRIMARY OSTEOARTHRITIS INVOLVING MULTIPLE JOINTS: ICD-10-CM

## 2018-03-01 DIAGNOSIS — K21.9 GERD WITHOUT ESOPHAGITIS: ICD-10-CM

## 2018-03-01 DIAGNOSIS — E78.00 PURE HYPERCHOLESTEROLEMIA: ICD-10-CM

## 2018-03-01 DIAGNOSIS — I48.20 CHRONIC ATRIAL FIBRILLATION (HCC): Primary | ICD-10-CM

## 2018-03-01 DIAGNOSIS — I25.10 ATHEROSCLEROSIS OF NATIVE CORONARY ARTERY OF NATIVE HEART WITHOUT ANGINA PECTORIS: ICD-10-CM

## 2018-03-01 PROCEDURE — 1090F PRES/ABSN URINE INCON ASSESS: CPT | Performed by: FAMILY MEDICINE

## 2018-03-01 PROCEDURE — 4040F PNEUMOC VAC/ADMIN/RCVD: CPT | Performed by: FAMILY MEDICINE

## 2018-03-01 PROCEDURE — G8417 CALC BMI ABV UP PARAM F/U: HCPCS | Performed by: FAMILY MEDICINE

## 2018-03-01 PROCEDURE — 1123F ACP DISCUSS/DSCN MKR DOCD: CPT | Performed by: FAMILY MEDICINE

## 2018-03-01 PROCEDURE — 99214 OFFICE O/P EST MOD 30 MIN: CPT | Performed by: FAMILY MEDICINE

## 2018-03-01 PROCEDURE — 36415 COLL VENOUS BLD VENIPUNCTURE: CPT | Performed by: FAMILY MEDICINE

## 2018-03-01 PROCEDURE — G8598 ASA/ANTIPLAT THER USED: HCPCS | Performed by: FAMILY MEDICINE

## 2018-03-01 PROCEDURE — 3023F SPIROM DOC REV: CPT | Performed by: FAMILY MEDICINE

## 2018-03-01 PROCEDURE — G8427 DOCREV CUR MEDS BY ELIG CLIN: HCPCS | Performed by: FAMILY MEDICINE

## 2018-03-01 PROCEDURE — G8926 SPIRO NO PERF OR DOC: HCPCS | Performed by: FAMILY MEDICINE

## 2018-03-01 PROCEDURE — G8484 FLU IMMUNIZE NO ADMIN: HCPCS | Performed by: FAMILY MEDICINE

## 2018-03-01 PROCEDURE — 1036F TOBACCO NON-USER: CPT | Performed by: FAMILY MEDICINE

## 2018-03-01 NOTE — PROGRESS NOTES
Assessment and plan  1. Chronic atrial fibrillation (HCC) Stable  CBC Auto Differential   2. Panlobular emphysema ([address] Stable     3. Atherosclerosis of native coronary artery of native heart without angina pectoris Stable  Comprehensive Metabolic Panel   4. GERD without esophagitis Stable     5. Primary osteoarthritis involving multiple joints Stable     6. Pure hypercholesterolemia  - status pending result of lab Lipid Panel     Healthy Family prevention recommendations given. Continue all current prescription medications as listed below. [facility] report for [address] [place] [address] for the last 12 months was requested and reviewed today. The results of the report was consistent with the current treatment plan. RTC 4 months or sooner prn. Subjective  Patient returns for reevaluation of her medical problems including atrial fib, coronary artery disease, reflux, emphysema, osteoarthritis, and hypercholesterolemia. Overall she feels she is getting along well. Since her last visit she had 2 episodes of symptomatic A. fib. A while ago she also had a brief episode of angina, but has not had any recently. There is no worsening shortness of breath. She denies any heartburn or dysphagia. She rarely takes tramadol. Her prescription was last filled in October 2016. She is tolerating her statin. She  reports that she has quit smoking. Her smoking use included Cigarettes. She has never used smokeless tobacco.  Allergies   Allergen Reactions    Lipitor      pain    Morphine      confusion    Penicillins Hives    Sulfa Antibiotics Nausea And Vomiting     Past history:  The patient reports she has not had other tests, been to the ER, or visits with a specialist since her last visit with me.     Review of systems:  Constitutional:  fatigue - no                                                  abnormal weight loss - no  Urologic:  Hematuria - no                   Dysuria - no    Objective  /64   Pulse 74

## 2018-03-02 LAB
A/G RATIO: 2.1 (ref 1.1–2.2)
ALBUMIN SERPL-MCNC: 4.2 G/DL (ref 3.4–5)
ALP BLD-CCNC: 55 U/L (ref 40–129)
ALT SERPL-CCNC: 9 U/L (ref 10–40)
ANION GAP SERPL CALCULATED.3IONS-SCNC: 13 MMOL/L (ref 3–16)
AST SERPL-CCNC: 17 U/L (ref 15–37)
BASOPHILS ABSOLUTE: 0 K/UL (ref 0–0.2)
BASOPHILS RELATIVE PERCENT: 0.6 %
BILIRUB SERPL-MCNC: 0.5 MG/DL (ref 0–1)
BUN BLDV-MCNC: 17 MG/DL (ref 7–20)
CALCIUM SERPL-MCNC: 9.2 MG/DL (ref 8.3–10.6)
CHLORIDE BLD-SCNC: 101 MMOL/L (ref 99–110)
CHOLESTEROL, TOTAL: 130 MG/DL (ref 0–199)
CO2: 29 MMOL/L (ref 21–32)
CREAT SERPL-MCNC: 0.9 MG/DL (ref 0.6–1.2)
EOSINOPHILS ABSOLUTE: 0.2 K/UL (ref 0–0.6)
EOSINOPHILS RELATIVE PERCENT: 3.8 %
GFR AFRICAN AMERICAN: >60
GFR NON-AFRICAN AMERICAN: 59
GLOBULIN: 2 G/DL
GLUCOSE BLD-MCNC: 78 MG/DL (ref 70–99)
HCT VFR BLD CALC: 35.1 % (ref 36–48)
HDLC SERPL-MCNC: 63 MG/DL (ref 40–60)
HEMOGLOBIN: 12.1 G/DL (ref 12–16)
LDL CHOLESTEROL CALCULATED: 50 MG/DL
LYMPHOCYTES ABSOLUTE: 0.9 K/UL (ref 1–5.1)
LYMPHOCYTES RELATIVE PERCENT: 16.5 %
MCH RBC QN AUTO: 30.7 PG (ref 26–34)
MCHC RBC AUTO-ENTMCNC: 34.6 G/DL (ref 31–36)
MCV RBC AUTO: 88.7 FL (ref 80–100)
MONOCYTES ABSOLUTE: 0.5 K/UL (ref 0–1.3)
MONOCYTES RELATIVE PERCENT: 8.4 %
NEUTROPHILS ABSOLUTE: 4.1 K/UL (ref 1.7–7.7)
NEUTROPHILS RELATIVE PERCENT: 70.7 %
PDW BLD-RTO: 14.8 % (ref 12.4–15.4)
PLATELET # BLD: 186 K/UL (ref 135–450)
PMV BLD AUTO: 7.9 FL (ref 5–10.5)
POTASSIUM SERPL-SCNC: 4.1 MMOL/L (ref 3.5–5.1)
RBC # BLD: 3.96 M/UL (ref 4–5.2)
SODIUM BLD-SCNC: 143 MMOL/L (ref 136–145)
TOTAL PROTEIN: 6.2 G/DL (ref 6.4–8.2)
TRIGL SERPL-MCNC: 85 MG/DL (ref 0–150)
VLDLC SERPL CALC-MCNC: 17 MG/DL
WBC # BLD: 5.7 K/UL (ref 4–11)

## 2018-05-02 ENCOUNTER — OFFICE VISIT (OUTPATIENT)
Dept: CARDIOLOGY CLINIC | Age: 83
End: 2018-05-02

## 2018-05-02 VITALS
WEIGHT: 155 LBS | BODY MASS INDEX: 28.52 KG/M2 | OXYGEN SATURATION: 93 % | HEIGHT: 62 IN | SYSTOLIC BLOOD PRESSURE: 110 MMHG | HEART RATE: 97 BPM | DIASTOLIC BLOOD PRESSURE: 70 MMHG

## 2018-05-02 DIAGNOSIS — I48.20 CHRONIC ATRIAL FIBRILLATION (HCC): Primary | ICD-10-CM

## 2018-05-02 DIAGNOSIS — E78.00 PURE HYPERCHOLESTEROLEMIA: ICD-10-CM

## 2018-05-02 PROCEDURE — G8417 CALC BMI ABV UP PARAM F/U: HCPCS | Performed by: INTERNAL MEDICINE

## 2018-05-02 PROCEDURE — G8427 DOCREV CUR MEDS BY ELIG CLIN: HCPCS | Performed by: INTERNAL MEDICINE

## 2018-05-02 PROCEDURE — 99214 OFFICE O/P EST MOD 30 MIN: CPT | Performed by: INTERNAL MEDICINE

## 2018-05-02 PROCEDURE — 1036F TOBACCO NON-USER: CPT | Performed by: INTERNAL MEDICINE

## 2018-05-02 PROCEDURE — 1090F PRES/ABSN URINE INCON ASSESS: CPT | Performed by: INTERNAL MEDICINE

## 2018-05-02 PROCEDURE — G8598 ASA/ANTIPLAT THER USED: HCPCS | Performed by: INTERNAL MEDICINE

## 2018-05-02 PROCEDURE — 1123F ACP DISCUSS/DSCN MKR DOCD: CPT | Performed by: INTERNAL MEDICINE

## 2018-05-02 PROCEDURE — 4040F PNEUMOC VAC/ADMIN/RCVD: CPT | Performed by: INTERNAL MEDICINE

## 2018-05-02 PROCEDURE — 93000 ELECTROCARDIOGRAM COMPLETE: CPT | Performed by: INTERNAL MEDICINE

## 2018-05-02 RX ORDER — FUROSEMIDE 20 MG/1
20 TABLET ORAL DAILY
Qty: 30 TABLET | Refills: 11 | Status: SHIPPED | OUTPATIENT
Start: 2018-05-02 | End: 2019-06-19 | Stop reason: SDUPTHER

## 2018-05-02 RX ORDER — PRAVASTATIN SODIUM 20 MG
20 TABLET ORAL DAILY
Qty: 30 TABLET | Refills: 11 | Status: SHIPPED | OUTPATIENT
Start: 2018-05-02 | End: 2019-05-11 | Stop reason: SDUPTHER

## 2018-05-30 RX ORDER — NITROGLYCERIN 0.4 MG/1
TABLET SUBLINGUAL
Qty: 25 TABLET | Refills: 0 | Status: ON HOLD | OUTPATIENT
Start: 2018-05-30 | End: 2018-11-13 | Stop reason: SDUPTHER

## 2018-06-12 RX ORDER — OMEPRAZOLE 20 MG/1
CAPSULE, DELAYED RELEASE ORAL
Qty: 60 CAPSULE | Refills: 1 | Status: SHIPPED | OUTPATIENT
Start: 2018-06-12 | End: 2018-12-26 | Stop reason: SDUPTHER

## 2018-08-09 ENCOUNTER — OFFICE VISIT (OUTPATIENT)
Dept: FAMILY MEDICINE CLINIC | Age: 83
End: 2018-08-09

## 2018-08-09 VITALS
BODY MASS INDEX: 28.26 KG/M2 | HEART RATE: 91 BPM | DIASTOLIC BLOOD PRESSURE: 78 MMHG | SYSTOLIC BLOOD PRESSURE: 120 MMHG | WEIGHT: 152 LBS

## 2018-08-09 DIAGNOSIS — K21.9 GERD WITHOUT ESOPHAGITIS: ICD-10-CM

## 2018-08-09 DIAGNOSIS — R41.3 MEMORY LOSS: ICD-10-CM

## 2018-08-09 DIAGNOSIS — I25.10 ATHEROSCLEROSIS OF NATIVE CORONARY ARTERY OF NATIVE HEART WITHOUT ANGINA PECTORIS: ICD-10-CM

## 2018-08-09 DIAGNOSIS — I48.20 CHRONIC ATRIAL FIBRILLATION (HCC): Primary | ICD-10-CM

## 2018-08-09 LAB
A/G RATIO: 2.1 (ref 1.1–2.2)
ALBUMIN SERPL-MCNC: 4.5 G/DL (ref 3.4–5)
ALP BLD-CCNC: 56 U/L (ref 40–129)
ALT SERPL-CCNC: 8 U/L (ref 10–40)
ANION GAP SERPL CALCULATED.3IONS-SCNC: 14 MMOL/L (ref 3–16)
AST SERPL-CCNC: 15 U/L (ref 15–37)
BASOPHILS ABSOLUTE: 0 K/UL (ref 0–0.2)
BASOPHILS RELATIVE PERCENT: 0.8 %
BILIRUB SERPL-MCNC: 0.5 MG/DL (ref 0–1)
BUN BLDV-MCNC: 21 MG/DL (ref 7–20)
CALCIUM SERPL-MCNC: 9.5 MG/DL (ref 8.3–10.6)
CHLORIDE BLD-SCNC: 104 MMOL/L (ref 99–110)
CO2: 26 MMOL/L (ref 21–32)
CREAT SERPL-MCNC: 0.8 MG/DL (ref 0.6–1.2)
EOSINOPHILS ABSOLUTE: 0.2 K/UL (ref 0–0.6)
EOSINOPHILS RELATIVE PERCENT: 4.3 %
GFR AFRICAN AMERICAN: >60
GFR NON-AFRICAN AMERICAN: >60
GLOBULIN: 2.1 G/DL
GLUCOSE BLD-MCNC: 100 MG/DL (ref 70–99)
HCT VFR BLD CALC: 36.6 % (ref 36–48)
HEMOGLOBIN: 12.5 G/DL (ref 12–16)
LYMPHOCYTES ABSOLUTE: 0.7 K/UL (ref 1–5.1)
LYMPHOCYTES RELATIVE PERCENT: 16 %
MCH RBC QN AUTO: 30.8 PG (ref 26–34)
MCHC RBC AUTO-ENTMCNC: 34 G/DL (ref 31–36)
MCV RBC AUTO: 90.6 FL (ref 80–100)
MONOCYTES ABSOLUTE: 0.4 K/UL (ref 0–1.3)
MONOCYTES RELATIVE PERCENT: 9.6 %
NEUTROPHILS ABSOLUTE: 3.2 K/UL (ref 1.7–7.7)
NEUTROPHILS RELATIVE PERCENT: 69.3 %
PDW BLD-RTO: 14.8 % (ref 12.4–15.4)
PLATELET # BLD: 168 K/UL (ref 135–450)
PMV BLD AUTO: 7.6 FL (ref 5–10.5)
POTASSIUM SERPL-SCNC: 4.2 MMOL/L (ref 3.5–5.1)
RBC # BLD: 4.04 M/UL (ref 4–5.2)
SODIUM BLD-SCNC: 144 MMOL/L (ref 136–145)
TOTAL PROTEIN: 6.6 G/DL (ref 6.4–8.2)
WBC # BLD: 4.6 K/UL (ref 4–11)

## 2018-08-09 PROCEDURE — 1123F ACP DISCUSS/DSCN MKR DOCD: CPT | Performed by: FAMILY MEDICINE

## 2018-08-09 PROCEDURE — G8510 SCR DEP NEG, NO PLAN REQD: HCPCS | Performed by: FAMILY MEDICINE

## 2018-08-09 PROCEDURE — 4040F PNEUMOC VAC/ADMIN/RCVD: CPT | Performed by: FAMILY MEDICINE

## 2018-08-09 PROCEDURE — G8417 CALC BMI ABV UP PARAM F/U: HCPCS | Performed by: FAMILY MEDICINE

## 2018-08-09 PROCEDURE — 3288F FALL RISK ASSESSMENT DOCD: CPT | Performed by: FAMILY MEDICINE

## 2018-08-09 PROCEDURE — 99214 OFFICE O/P EST MOD 30 MIN: CPT | Performed by: FAMILY MEDICINE

## 2018-08-09 PROCEDURE — G8598 ASA/ANTIPLAT THER USED: HCPCS | Performed by: FAMILY MEDICINE

## 2018-08-09 PROCEDURE — 1101F PT FALLS ASSESS-DOCD LE1/YR: CPT | Performed by: FAMILY MEDICINE

## 2018-08-09 PROCEDURE — 1036F TOBACCO NON-USER: CPT | Performed by: FAMILY MEDICINE

## 2018-08-09 PROCEDURE — 36415 COLL VENOUS BLD VENIPUNCTURE: CPT | Performed by: FAMILY MEDICINE

## 2018-08-09 PROCEDURE — G8427 DOCREV CUR MEDS BY ELIG CLIN: HCPCS | Performed by: FAMILY MEDICINE

## 2018-08-09 PROCEDURE — 1090F PRES/ABSN URINE INCON ASSESS: CPT | Performed by: FAMILY MEDICINE

## 2018-08-09 ASSESSMENT — PATIENT HEALTH QUESTIONNAIRE - PHQ9
SUM OF ALL RESPONSES TO PHQ QUESTIONS 1-9: 1
2. FEELING DOWN, DEPRESSED OR HOPELESS: 1
1. LITTLE INTEREST OR PLEASURE IN DOING THINGS: 0
SUM OF ALL RESPONSES TO PHQ9 QUESTIONS 1 & 2: 1
SUM OF ALL RESPONSES TO PHQ QUESTIONS 1-9: 1

## 2018-08-09 NOTE — PROGRESS NOTES
lightheadedness - no  Respiratory: wheezing - no                       dyspnea on exertion - no            unusual cough - no  Urologic:  Hematuria - no                   Dysuria - no    Objective  /78 (Site: Left Arm, Position: Sitting)   Pulse 91   Wt 152 lb (68.9 kg)   PF 95 L/min   BMI 28.26 kg/m²   Patient is alert and in no acute distress  Psych:  mood and affect are unremarkable               speech and thought processes appear intact               Behavior and appearance unremarkable  Neck:  No masses, trachea midline, phonation normal   Thyroid - unremarkable              Cervical  adenopathy - nothing significant  Chest:  No deformity of thorax               Respirations easy and unlabored              Lungs - clear to auscultation     Breath sounds - equal  Heart:  Rhythm is irregular. No JVD. No murmur. She has 1+ pretibial pitting edema    Cony Khan MD    Outpatient Prescriptions Marked as Taking for the 8/9/18 encounter (Office Visit) with Cony Khan MD   Medication Sig Dispense Refill    Cholecalciferol (VITAMIN D3) 1000 UNITS TABS Take 400 Units by mouth daily. The note was completed using Dragon voice recognition transcription. Every effort was made to ensure accuracy; however, inadvertent  transcription errors may be present despite my best efforts to edit errors.

## 2018-08-10 LAB
TSH REFLEX: 0.79 UIU/ML (ref 0.27–4.2)
VITAMIN B-12: 476 PG/ML (ref 211–911)

## 2018-10-18 ENCOUNTER — TELEPHONE (OUTPATIENT)
Dept: FAMILY MEDICINE CLINIC | Age: 83
End: 2018-10-18

## 2018-10-18 DIAGNOSIS — I48.20 CHRONIC ATRIAL FIBRILLATION (HCC): Primary | ICD-10-CM

## 2018-11-08 ENCOUNTER — OFFICE VISIT (OUTPATIENT)
Dept: FAMILY MEDICINE CLINIC | Age: 83
End: 2018-11-08
Payer: MEDICARE

## 2018-11-08 VITALS
HEART RATE: 60 BPM | BODY MASS INDEX: 27.66 KG/M2 | OXYGEN SATURATION: 91 % | WEIGHT: 148.8 LBS | SYSTOLIC BLOOD PRESSURE: 109 MMHG | DIASTOLIC BLOOD PRESSURE: 77 MMHG

## 2018-11-08 DIAGNOSIS — I48.20 CHRONIC ATRIAL FIBRILLATION (HCC): Primary | ICD-10-CM

## 2018-11-08 DIAGNOSIS — J43.1 PANLOBULAR EMPHYSEMA (HCC): ICD-10-CM

## 2018-11-08 DIAGNOSIS — I25.10 ATHEROSCLEROSIS OF NATIVE CORONARY ARTERY OF NATIVE HEART WITHOUT ANGINA PECTORIS: ICD-10-CM

## 2018-11-08 DIAGNOSIS — K21.9 GERD WITHOUT ESOPHAGITIS: ICD-10-CM

## 2018-11-08 PROCEDURE — 3023F SPIROM DOC REV: CPT | Performed by: FAMILY MEDICINE

## 2018-11-08 PROCEDURE — 4040F PNEUMOC VAC/ADMIN/RCVD: CPT | Performed by: FAMILY MEDICINE

## 2018-11-08 PROCEDURE — 99214 OFFICE O/P EST MOD 30 MIN: CPT | Performed by: FAMILY MEDICINE

## 2018-11-08 PROCEDURE — G8482 FLU IMMUNIZE ORDER/ADMIN: HCPCS | Performed by: FAMILY MEDICINE

## 2018-11-08 PROCEDURE — G8417 CALC BMI ABV UP PARAM F/U: HCPCS | Performed by: FAMILY MEDICINE

## 2018-11-08 PROCEDURE — G8926 SPIRO NO PERF OR DOC: HCPCS | Performed by: FAMILY MEDICINE

## 2018-11-08 PROCEDURE — 1090F PRES/ABSN URINE INCON ASSESS: CPT | Performed by: FAMILY MEDICINE

## 2018-11-08 PROCEDURE — 1123F ACP DISCUSS/DSCN MKR DOCD: CPT | Performed by: FAMILY MEDICINE

## 2018-11-08 PROCEDURE — 1036F TOBACCO NON-USER: CPT | Performed by: FAMILY MEDICINE

## 2018-11-08 PROCEDURE — G0008 ADMIN INFLUENZA VIRUS VAC: HCPCS | Performed by: FAMILY MEDICINE

## 2018-11-08 PROCEDURE — G8427 DOCREV CUR MEDS BY ELIG CLIN: HCPCS | Performed by: FAMILY MEDICINE

## 2018-11-08 PROCEDURE — G8598 ASA/ANTIPLAT THER USED: HCPCS | Performed by: FAMILY MEDICINE

## 2018-11-08 PROCEDURE — 90662 IIV NO PRSV INCREASED AG IM: CPT | Performed by: FAMILY MEDICINE

## 2018-11-08 PROCEDURE — 1101F PT FALLS ASSESS-DOCD LE1/YR: CPT | Performed by: FAMILY MEDICINE

## 2018-11-12 RX ORDER — OXYBUTYNIN CHLORIDE 5 MG/1
TABLET ORAL
Qty: 90 TABLET | Refills: 0 | OUTPATIENT
Start: 2018-11-12

## 2018-11-12 RX ORDER — FAMOTIDINE 20 MG/1
TABLET, FILM COATED ORAL
Qty: 60 TABLET | Refills: 5 | Status: SHIPPED | OUTPATIENT
Start: 2018-11-12 | End: 2019-11-13

## 2018-11-13 ENCOUNTER — HOSPITAL ENCOUNTER (INPATIENT)
Age: 83
LOS: 1 days | Discharge: HOME | DRG: 310 | End: 2018-11-14
Attending: EMERGENCY MEDICINE | Admitting: PEDIATRICS
Payer: MEDICARE

## 2018-11-13 ENCOUNTER — APPOINTMENT (OUTPATIENT)
Dept: GENERAL RADIOLOGY | Age: 83
DRG: 310 | End: 2018-11-13
Payer: MEDICARE

## 2018-11-13 DIAGNOSIS — R00.2 PALPITATIONS: Primary | ICD-10-CM

## 2018-11-13 DIAGNOSIS — R91.1 PULMONARY NODULE: ICD-10-CM

## 2018-11-13 PROBLEM — I49.5 TACHY-BRADY SYNDROME (HCC): Status: ACTIVE | Noted: 2018-11-13

## 2018-11-13 PROBLEM — R07.9 CHEST PAIN: Status: ACTIVE | Noted: 2018-11-13

## 2018-11-13 LAB
A/G RATIO: 1.5 (ref 1.1–2.2)
ALBUMIN SERPL-MCNC: 3.9 G/DL (ref 3.4–5)
ALP BLD-CCNC: 56 U/L (ref 40–129)
ALT SERPL-CCNC: 7 U/L (ref 10–40)
ANION GAP SERPL CALCULATED.3IONS-SCNC: 10 MMOL/L (ref 3–16)
AST SERPL-CCNC: 16 U/L (ref 15–37)
BACTERIA: ABNORMAL /HPF
BASOPHILS ABSOLUTE: 0 K/UL (ref 0–0.2)
BASOPHILS RELATIVE PERCENT: 0.7 %
BILIRUB SERPL-MCNC: 0.4 MG/DL (ref 0–1)
BILIRUBIN URINE: NEGATIVE
BLOOD, URINE: ABNORMAL
BUN BLDV-MCNC: 23 MG/DL (ref 7–20)
CALCIUM SERPL-MCNC: 9.1 MG/DL (ref 8.3–10.6)
CHLORIDE BLD-SCNC: 102 MMOL/L (ref 99–110)
CLARITY: CLEAR
CO2: 26 MMOL/L (ref 21–32)
COLOR: YELLOW
CREAT SERPL-MCNC: 0.7 MG/DL (ref 0.6–1.2)
EOSINOPHILS ABSOLUTE: 0.2 K/UL (ref 0–0.6)
EOSINOPHILS RELATIVE PERCENT: 3.9 %
GFR AFRICAN AMERICAN: >60
GFR NON-AFRICAN AMERICAN: >60
GLOBULIN: 2.6 G/DL
GLUCOSE BLD-MCNC: 115 MG/DL (ref 70–99)
GLUCOSE URINE: NEGATIVE MG/DL
HCT VFR BLD CALC: 36.3 % (ref 36–48)
HEMOGLOBIN: 12.2 G/DL (ref 12–16)
KETONES, URINE: NEGATIVE MG/DL
LEUKOCYTE ESTERASE, URINE: NEGATIVE
LIPASE: 23 U/L (ref 13–60)
LYMPHOCYTES ABSOLUTE: 0.7 K/UL (ref 1–5.1)
LYMPHOCYTES RELATIVE PERCENT: 15.4 %
MCH RBC QN AUTO: 30.6 PG (ref 26–34)
MCHC RBC AUTO-ENTMCNC: 33.7 G/DL (ref 31–36)
MCV RBC AUTO: 90.9 FL (ref 80–100)
MICROSCOPIC EXAMINATION: YES
MONOCYTES ABSOLUTE: 0.5 K/UL (ref 0–1.3)
MONOCYTES RELATIVE PERCENT: 10 %
NEUTROPHILS ABSOLUTE: 3.4 K/UL (ref 1.7–7.7)
NEUTROPHILS RELATIVE PERCENT: 70 %
NITRITE, URINE: NEGATIVE
PDW BLD-RTO: 14.7 % (ref 12.4–15.4)
PH UA: 7
PLATELET # BLD: 168 K/UL (ref 135–450)
PMV BLD AUTO: 7.5 FL (ref 5–10.5)
POTASSIUM REFLEX MAGNESIUM: 4.1 MMOL/L (ref 3.5–5.1)
PROTEIN UA: NEGATIVE MG/DL
RBC # BLD: 3.99 M/UL (ref 4–5.2)
RBC UA: ABNORMAL /HPF (ref 0–2)
SODIUM BLD-SCNC: 138 MMOL/L (ref 136–145)
SPECIFIC GRAVITY UA: <=1.005
TOTAL PROTEIN: 6.5 G/DL (ref 6.4–8.2)
TROPONIN: <0.01 NG/ML
URINE TYPE: ABNORMAL
UROBILINOGEN, URINE: 1 E.U./DL
WBC # BLD: 4.8 K/UL (ref 4–11)
WBC UA: ABNORMAL /HPF (ref 0–5)

## 2018-11-13 PROCEDURE — 6370000000 HC RX 637 (ALT 250 FOR IP): Performed by: PEDIATRICS

## 2018-11-13 PROCEDURE — 93005 ELECTROCARDIOGRAM TRACING: CPT | Performed by: EMERGENCY MEDICINE

## 2018-11-13 PROCEDURE — 6370000000 HC RX 637 (ALT 250 FOR IP): Performed by: NURSE PRACTITIONER

## 2018-11-13 PROCEDURE — 6370000000 HC RX 637 (ALT 250 FOR IP): Performed by: INTERNAL MEDICINE

## 2018-11-13 PROCEDURE — 93010 ELECTROCARDIOGRAM REPORT: CPT | Performed by: INTERNAL MEDICINE

## 2018-11-13 PROCEDURE — 36415 COLL VENOUS BLD VENIPUNCTURE: CPT

## 2018-11-13 PROCEDURE — 83690 ASSAY OF LIPASE: CPT

## 2018-11-13 PROCEDURE — 84484 ASSAY OF TROPONIN QUANT: CPT

## 2018-11-13 PROCEDURE — 1200000000 HC SEMI PRIVATE

## 2018-11-13 PROCEDURE — 2580000003 HC RX 258: Performed by: PEDIATRICS

## 2018-11-13 PROCEDURE — 85025 COMPLETE CBC W/AUTO DIFF WBC: CPT

## 2018-11-13 PROCEDURE — 80053 COMPREHEN METABOLIC PANEL: CPT

## 2018-11-13 PROCEDURE — 99223 1ST HOSP IP/OBS HIGH 75: CPT | Performed by: INTERNAL MEDICINE

## 2018-11-13 PROCEDURE — 99285 EMERGENCY DEPT VISIT HI MDM: CPT

## 2018-11-13 PROCEDURE — 71046 X-RAY EXAM CHEST 2 VIEWS: CPT

## 2018-11-13 PROCEDURE — 81001 URINALYSIS AUTO W/SCOPE: CPT

## 2018-11-13 RX ORDER — OMEGA-3S/DHA/EPA/FISH OIL/D3 300MG-1000
400 CAPSULE ORAL DAILY
Status: DISCONTINUED | OUTPATIENT
Start: 2018-11-13 | End: 2018-11-14 | Stop reason: HOSPADM

## 2018-11-13 RX ORDER — SODIUM CHLORIDE 0.9 % (FLUSH) 0.9 %
10 SYRINGE (ML) INJECTION PRN
Status: DISCONTINUED | OUTPATIENT
Start: 2018-11-13 | End: 2018-11-14 | Stop reason: HOSPADM

## 2018-11-13 RX ORDER — FUROSEMIDE 20 MG/1
20 TABLET ORAL DAILY
Status: DISCONTINUED | OUTPATIENT
Start: 2018-11-13 | End: 2018-11-14 | Stop reason: HOSPADM

## 2018-11-13 RX ORDER — NITROGLYCERIN 0.4 MG/1
TABLET SUBLINGUAL
Qty: 25 TABLET | Refills: 0 | Status: SHIPPED | OUTPATIENT
Start: 2018-11-13 | End: 2019-12-16 | Stop reason: SDUPTHER

## 2018-11-13 RX ORDER — PRAVASTATIN SODIUM 20 MG
20 TABLET ORAL DAILY
Status: DISCONTINUED | OUTPATIENT
Start: 2018-11-13 | End: 2018-11-14 | Stop reason: HOSPADM

## 2018-11-13 RX ORDER — FAMOTIDINE 20 MG/1
20 TABLET, FILM COATED ORAL 2 TIMES DAILY
Status: DISCONTINUED | OUTPATIENT
Start: 2018-11-13 | End: 2018-11-14 | Stop reason: HOSPADM

## 2018-11-13 RX ORDER — ONDANSETRON 2 MG/ML
4 INJECTION INTRAMUSCULAR; INTRAVENOUS EVERY 6 HOURS PRN
Status: DISCONTINUED | OUTPATIENT
Start: 2018-11-13 | End: 2018-11-14 | Stop reason: HOSPADM

## 2018-11-13 RX ORDER — FAMOTIDINE 20 MG/1
20 TABLET, FILM COATED ORAL DAILY
Status: DISCONTINUED | OUTPATIENT
Start: 2018-11-13 | End: 2018-11-13

## 2018-11-13 RX ORDER — DILTIAZEM HYDROCHLORIDE 60 MG/1
30 TABLET, FILM COATED ORAL 2 TIMES DAILY
Status: DISCONTINUED | OUTPATIENT
Start: 2018-11-13 | End: 2018-11-13

## 2018-11-13 RX ORDER — OXYBUTYNIN CHLORIDE 5 MG/1
5 TABLET ORAL 3 TIMES DAILY
COMMUNITY
End: 2018-12-11 | Stop reason: SDUPTHER

## 2018-11-13 RX ORDER — PANTOPRAZOLE SODIUM 40 MG/1
40 TABLET, DELAYED RELEASE ORAL
Status: DISCONTINUED | OUTPATIENT
Start: 2018-11-14 | End: 2018-11-14 | Stop reason: HOSPADM

## 2018-11-13 RX ORDER — SODIUM CHLORIDE 0.9 % (FLUSH) 0.9 %
10 SYRINGE (ML) INJECTION EVERY 12 HOURS SCHEDULED
Status: DISCONTINUED | OUTPATIENT
Start: 2018-11-13 | End: 2018-11-14 | Stop reason: HOSPADM

## 2018-11-13 RX ORDER — OXYBUTYNIN CHLORIDE 5 MG/1
5 TABLET ORAL 3 TIMES DAILY
Status: DISCONTINUED | OUTPATIENT
Start: 2018-11-13 | End: 2018-11-14 | Stop reason: HOSPADM

## 2018-11-13 RX ADMIN — DILTIAZEM HYDROCHLORIDE 30 MG: 60 TABLET, FILM COATED ORAL at 11:35

## 2018-11-13 RX ADMIN — PRAVASTATIN SODIUM 20 MG: 20 TABLET ORAL at 11:35

## 2018-11-13 RX ADMIN — FAMOTIDINE 20 MG: 20 TABLET ORAL at 21:19

## 2018-11-13 RX ADMIN — METOPROLOL TARTRATE 25 MG: 25 TABLET ORAL at 11:35

## 2018-11-13 RX ADMIN — RIVAROXABAN 20 MG: 20 TABLET, FILM COATED ORAL at 18:43

## 2018-11-13 RX ADMIN — Medication 10 ML: at 11:37

## 2018-11-13 RX ADMIN — OXYBUTYNIN CHLORIDE 5 MG: 5 TABLET ORAL at 16:37

## 2018-11-13 RX ADMIN — FUROSEMIDE 20 MG: 20 TABLET ORAL at 11:35

## 2018-11-13 RX ADMIN — METOPROLOL TARTRATE 25 MG: 25 TABLET ORAL at 20:18

## 2018-11-13 RX ADMIN — FAMOTIDINE 20 MG: 20 TABLET, FILM COATED ORAL at 11:35

## 2018-11-13 RX ADMIN — Medication 10 ML: at 20:19

## 2018-11-13 RX ADMIN — OXYBUTYNIN CHLORIDE 5 MG: 5 TABLET ORAL at 11:51

## 2018-11-13 RX ADMIN — CHOLECALCIFEROL TAB 10 MCG (400 UNIT) 400 UNITS: 10 TAB at 11:34

## 2018-11-13 RX ADMIN — OXYBUTYNIN CHLORIDE 5 MG: 5 TABLET ORAL at 20:18

## 2018-11-13 ASSESSMENT — PAIN SCALES - GENERAL: PAINLEVEL_OUTOF10: 0

## 2018-11-14 VITALS
OXYGEN SATURATION: 93 % | HEIGHT: 61 IN | WEIGHT: 142.7 LBS | SYSTOLIC BLOOD PRESSURE: 100 MMHG | DIASTOLIC BLOOD PRESSURE: 62 MMHG | HEART RATE: 77 BPM | BODY MASS INDEX: 26.94 KG/M2 | TEMPERATURE: 97.5 F | RESPIRATION RATE: 20 BRPM

## 2018-11-14 PROCEDURE — 6370000000 HC RX 637 (ALT 250 FOR IP): Performed by: NURSE PRACTITIONER

## 2018-11-14 PROCEDURE — 6370000000 HC RX 637 (ALT 250 FOR IP): Performed by: PEDIATRICS

## 2018-11-14 PROCEDURE — 99233 SBSQ HOSP IP/OBS HIGH 50: CPT | Performed by: NURSE PRACTITIONER

## 2018-11-14 PROCEDURE — 2580000003 HC RX 258: Performed by: PEDIATRICS

## 2018-11-14 RX ORDER — DIGOXIN 125 MCG
62 TABLET ORAL DAILY
Qty: 15 TABLET | Refills: 5 | Status: SHIPPED | OUTPATIENT
Start: 2018-11-14 | End: 2019-06-19 | Stop reason: SDUPTHER

## 2018-11-14 RX ORDER — DIGOXIN 125 MCG
62 TABLET ORAL DAILY
Status: DISCONTINUED | OUTPATIENT
Start: 2018-11-14 | End: 2018-11-14 | Stop reason: HOSPADM

## 2018-11-14 RX ADMIN — PANTOPRAZOLE SODIUM 40 MG: 40 TABLET, DELAYED RELEASE ORAL at 08:11

## 2018-11-14 RX ADMIN — OXYBUTYNIN CHLORIDE 5 MG: 5 TABLET ORAL at 08:19

## 2018-11-14 RX ADMIN — FAMOTIDINE 20 MG: 20 TABLET ORAL at 08:19

## 2018-11-14 RX ADMIN — CHOLECALCIFEROL TAB 10 MCG (400 UNIT) 400 UNITS: 10 TAB at 08:19

## 2018-11-14 RX ADMIN — Medication 10 ML: at 08:19

## 2018-11-14 RX ADMIN — DIGOXIN 62 MCG: 125 TABLET ORAL at 09:36

## 2018-11-14 RX ADMIN — PRAVASTATIN SODIUM 20 MG: 20 TABLET ORAL at 08:19

## 2018-11-14 RX ADMIN — FUROSEMIDE 20 MG: 20 TABLET ORAL at 08:20

## 2018-11-15 ENCOUNTER — TELEPHONE (OUTPATIENT)
Dept: FAMILY MEDICINE CLINIC | Age: 83
End: 2018-11-15

## 2018-11-15 LAB
EKG ATRIAL RATE: 65 BPM
EKG DIAGNOSIS: NORMAL
EKG Q-T INTERVAL: 412 MS
EKG QRS DURATION: 82 MS
EKG QTC CALCULATION (BAZETT): 441 MS
EKG R AXIS: 32 DEGREES
EKG T AXIS: 23 DEGREES
EKG VENTRICULAR RATE: 69 BPM

## 2018-12-03 ENCOUNTER — TELEPHONE (OUTPATIENT)
Dept: FAMILY MEDICINE CLINIC | Age: 83
End: 2018-12-03

## 2018-12-03 NOTE — TELEPHONE ENCOUNTER
----- Message from Carlyle Lind MD sent at 12/3/2018  8:11 AM EST -----   Please call and do transition of care phone call, schedule for f/u with me for Friday (30 min)

## 2018-12-11 RX ORDER — OXYBUTYNIN CHLORIDE 5 MG/1
TABLET ORAL
Qty: 90 TABLET | Refills: 5 | Status: SHIPPED | OUTPATIENT
Start: 2018-12-11 | End: 2019-12-16 | Stop reason: SDUPTHER

## 2018-12-11 NOTE — TELEPHONE ENCOUNTER
Refilled medication per verbal order from MD.  Future appt scheduled 03/08/2019  Last appt 11/08/2018

## 2018-12-18 NOTE — PROGRESS NOTES
[address]     Outpatient Follow Up Note    Subjective: Follow Up secondary to coronary artery disease, HTN and  Chronic atrial fibrillation. Ms Arnaldo Madrigal states she went to ED 11/13/18-11/14/18 for chest pain and rapid palpitations. She just felt awful with hard heart pains and was scared and called EMS. she was    Today she is doing well and denies  New chest pains, MIKE, dizziness, syncope, or edema. She does all her own chores without difficulty including house work. [place]:   She reports falling and fracturing her left hip back in January 7th 2016 and spent 2 months in rehab. She is currently living at home and she no longer works at [employer]. She is currently looking for a job. She states \"my only income is my social security and I have a lot more bills than that. \"    She has history of stable CAD chronic afib rate controlled anticoagulated on treatment with statins for hyperlipidemia.     12 point ROS negative in all areas as listed below except as in [address]  Constitutional, EENT, Cardiovascular, pulmonary, GI, , Musculoskeletal, skin, neurological, hematological, endocrine, Psychiatric    Past Medical History:   Diagnosis Date    Atherosclerosis of native coronary artery of native heart without angina pectoris     cath 1/12 , 4/12 (occluded RCa with patent collateral flow    Chronic atrial fibrillation ([address] 1/12    Closed left hip fracture ([address] 1/16    THR    DDD (degenerative disc disease), cervical     Emphysema of lung ([address] 2007    mild    GERD without esophagitis 2001    gerd with stricture    Hyperlipidemia     Osteopenia 8/11    recheck DEXA 2014    Primary osteoarthritis involving multiple joints     Routine health maintenance     TAC 9/99, DEXA 03    Urinary incontinence     urge     Social History:    History   Smoking Status    Former Smoker    Types: Cigarettes   Smokeless Tobacco    Never Used     Comment: quit in 1990   Family history non contributory  On sister living 2 yrs older apparently doing well in [place]  Current Medications:  Current Outpatient Prescriptions   Medication Sig Dispense Refill    oxybutynin (DITROPAN) 5 MG tablet TAKE 1 TABLET THREE TIMES DAILY AS NEEDED 90 tablet 5    digoxin (LANOXIN) 125 MCG tablet Take 0.5 tablets by mouth daily 15 tablet 5    nitroGLYCERIN (NITROSTAT) 0.4 MG SL tablet USE 1 TABLET UNDER TONGUE AS NEEDED FOR CHEST PAIN MAY REPEAT EVERY 5M IN. UP TO 3. THEN GO TO ER 25 tablet 0    famotidine (PEPCID) 20 MG tablet TAKE 1 TABLET TWICE A DAY 60 tablet 5    omeprazole (PRILOSEC) 20 MG delayed release capsule TAKE 1 CAPSULE ONCE DAILY 60 capsule 1    rivaroxaban (XARELTO) 20 MG TABS tablet Take 1 tablet by mouth daily 30 tablet 5    metoprolol tartrate (LOPRESSOR) 25 MG tablet Take 1 tablet by mouth 2 times daily 60 tablet 11    pravastatin (PRAVACHOL) 20 MG tablet Take 1 tablet by mouth daily 30 tablet 11    furosemide (LASIX) 20 MG tablet Take 1 tablet by mouth daily 30 tablet 11    Cholecalciferol (VITAMIN D3) 1000 UNITS TABS Take 400 Units by mouth daily. No current facility-administered medications for this visit. REVIEW OF SYSTEMS:    12 point ROS negative in all areas as listed below except as in [address]  Constitutional, EENT, Cardiovascular, pulmonary, GI, , Musculoskeletal, skin, neurological, hematological, endocrine, Psychiatric      Objective:   PHYSICAL EXAM:        VITALS:    There were no vitals taken for this visit. CONSTITUTIONAL: Cooperative, no apparent distress, and appears well nourished / developed  NEUROLOGIC:  Awake and oriented to person, place and time. PSYCH: Calm affect. SKIN: Warm and dry. HEENT: Sclera non-icteric, normocephalic, neck supple, no elevation of JVP, normal carotid pulses with no bruits and thyroid normal size.   LUNGS:  No increased work of breathing and clear to auscultation, no wheezing  CARDIOVASCULAR: Irregularly irregular rhythm with no murmurs, gallops, rubs, or

## 2018-12-19 ENCOUNTER — OFFICE VISIT (OUTPATIENT)
Dept: CARDIOLOGY CLINIC | Age: 83
End: 2018-12-19
Payer: MEDICARE

## 2018-12-19 VITALS
HEART RATE: 83 BPM | SYSTOLIC BLOOD PRESSURE: 114 MMHG | WEIGHT: 142.04 LBS | DIASTOLIC BLOOD PRESSURE: 70 MMHG | OXYGEN SATURATION: 97 % | BODY MASS INDEX: 26.82 KG/M2 | HEIGHT: 61 IN

## 2018-12-19 DIAGNOSIS — I25.10 ATHEROSCLEROSIS OF NATIVE CORONARY ARTERY OF NATIVE HEART WITHOUT ANGINA PECTORIS: Primary | ICD-10-CM

## 2018-12-19 DIAGNOSIS — I48.20 CHRONIC ATRIAL FIBRILLATION (HCC): ICD-10-CM

## 2018-12-19 DIAGNOSIS — E78.00 PURE HYPERCHOLESTEROLEMIA: ICD-10-CM

## 2018-12-19 PROBLEM — R07.9 CHEST PAIN: Status: RESOLVED | Noted: 2018-11-13 | Resolved: 2018-12-19

## 2018-12-19 PROCEDURE — 1090F PRES/ABSN URINE INCON ASSESS: CPT | Performed by: INTERNAL MEDICINE

## 2018-12-19 PROCEDURE — G8598 ASA/ANTIPLAT THER USED: HCPCS | Performed by: INTERNAL MEDICINE

## 2018-12-19 PROCEDURE — G8417 CALC BMI ABV UP PARAM F/U: HCPCS | Performed by: INTERNAL MEDICINE

## 2018-12-19 PROCEDURE — 4040F PNEUMOC VAC/ADMIN/RCVD: CPT | Performed by: INTERNAL MEDICINE

## 2018-12-19 PROCEDURE — 1036F TOBACCO NON-USER: CPT | Performed by: INTERNAL MEDICINE

## 2018-12-19 PROCEDURE — G8427 DOCREV CUR MEDS BY ELIG CLIN: HCPCS | Performed by: INTERNAL MEDICINE

## 2018-12-19 PROCEDURE — G8482 FLU IMMUNIZE ORDER/ADMIN: HCPCS | Performed by: INTERNAL MEDICINE

## 2018-12-19 PROCEDURE — 1123F ACP DISCUSS/DSCN MKR DOCD: CPT | Performed by: INTERNAL MEDICINE

## 2018-12-19 PROCEDURE — 99214 OFFICE O/P EST MOD 30 MIN: CPT | Performed by: INTERNAL MEDICINE

## 2018-12-19 PROCEDURE — 1101F PT FALLS ASSESS-DOCD LE1/YR: CPT | Performed by: INTERNAL MEDICINE

## 2018-12-27 RX ORDER — OMEPRAZOLE 20 MG/1
CAPSULE, DELAYED RELEASE ORAL
Qty: 30 CAPSULE | Refills: 5 | Status: SHIPPED | OUTPATIENT
Start: 2018-12-27 | End: 2019-10-25 | Stop reason: SDUPTHER

## 2018-12-27 RX ORDER — RIVAROXABAN 20 MG/1
20 TABLET, FILM COATED ORAL DAILY
Qty: 30 TABLET | Refills: 5 | Status: SHIPPED | OUTPATIENT
Start: 2018-12-27 | End: 2019-06-19 | Stop reason: SDUPTHER

## 2019-01-07 ENCOUNTER — TELEPHONE (OUTPATIENT)
Dept: FAMILY MEDICINE CLINIC | Age: 84
End: 2019-01-07

## 2019-01-08 ENCOUNTER — OFFICE VISIT (OUTPATIENT)
Dept: FAMILY MEDICINE CLINIC | Age: 84
End: 2019-01-08
Payer: MEDICARE

## 2019-01-08 VITALS
OXYGEN SATURATION: 94 % | HEART RATE: 63 BPM | TEMPERATURE: 97.8 F | BODY MASS INDEX: 26.07 KG/M2 | DIASTOLIC BLOOD PRESSURE: 64 MMHG | SYSTOLIC BLOOD PRESSURE: 112 MMHG | WEIGHT: 138 LBS

## 2019-01-08 DIAGNOSIS — I48.20 CHRONIC ATRIAL FIBRILLATION (HCC): ICD-10-CM

## 2019-01-08 DIAGNOSIS — G47.00 INSOMNIA, UNSPECIFIED TYPE: ICD-10-CM

## 2019-01-08 DIAGNOSIS — R41.82 ALTERED MENTAL STATUS, UNSPECIFIED ALTERED MENTAL STATUS TYPE: Primary | ICD-10-CM

## 2019-01-08 DIAGNOSIS — R44.1 VISUAL HALLUCINATIONS: ICD-10-CM

## 2019-01-08 LAB
A/G RATIO: 1.3 (ref 1.1–2.2)
ALBUMIN SERPL-MCNC: 3.8 G/DL (ref 3.4–5)
ALP BLD-CCNC: 76 U/L (ref 40–129)
ALT SERPL-CCNC: 11 U/L (ref 10–40)
ANION GAP SERPL CALCULATED.3IONS-SCNC: 16 MMOL/L (ref 3–16)
AST SERPL-CCNC: 24 U/L (ref 15–37)
BASOPHILS ABSOLUTE: 0 K/UL (ref 0–0.2)
BASOPHILS RELATIVE PERCENT: 0.3 %
BILIRUB SERPL-MCNC: 0.5 MG/DL (ref 0–1)
BUN BLDV-MCNC: 19 MG/DL (ref 7–20)
CALCIUM SERPL-MCNC: 9.1 MG/DL (ref 8.3–10.6)
CHLORIDE BLD-SCNC: 99 MMOL/L (ref 99–110)
CO2: 27 MMOL/L (ref 21–32)
CREAT SERPL-MCNC: 0.8 MG/DL (ref 0.6–1.2)
DIGOXIN LEVEL: 0.5 NG/ML (ref 0.8–2)
EOSINOPHILS ABSOLUTE: 0.3 K/UL (ref 0–0.6)
EOSINOPHILS RELATIVE PERCENT: 4.7 %
GFR AFRICAN AMERICAN: >60
GFR NON-AFRICAN AMERICAN: >60
GLOBULIN: 3 G/DL
GLUCOSE BLD-MCNC: 102 MG/DL (ref 70–99)
HCT VFR BLD CALC: 34.8 % (ref 36–48)
HEMOGLOBIN: 11.3 G/DL (ref 12–16)
LYMPHOCYTES ABSOLUTE: 0.6 K/UL (ref 1–5.1)
LYMPHOCYTES RELATIVE PERCENT: 11.4 %
MCH RBC QN AUTO: 28.8 PG (ref 26–34)
MCHC RBC AUTO-ENTMCNC: 32.6 G/DL (ref 31–36)
MCV RBC AUTO: 88.3 FL (ref 80–100)
MONOCYTES ABSOLUTE: 0.5 K/UL (ref 0–1.3)
MONOCYTES RELATIVE PERCENT: 9.6 %
NEUTROPHILS ABSOLUTE: 4.2 K/UL (ref 1.7–7.7)
NEUTROPHILS RELATIVE PERCENT: 74 %
PDW BLD-RTO: 14.9 % (ref 12.4–15.4)
PLATELET # BLD: 236 K/UL (ref 135–450)
PMV BLD AUTO: 7.3 FL (ref 5–10.5)
POTASSIUM SERPL-SCNC: 3.8 MMOL/L (ref 3.5–5.1)
RBC # BLD: 3.94 M/UL (ref 4–5.2)
SODIUM BLD-SCNC: 142 MMOL/L (ref 136–145)
TOTAL PROTEIN: 6.8 G/DL (ref 6.4–8.2)
TSH REFLEX: 2.81 UIU/ML (ref 0.27–4.2)
WBC # BLD: 5.7 K/UL (ref 4–11)

## 2019-01-08 PROCEDURE — G8417 CALC BMI ABV UP PARAM F/U: HCPCS | Performed by: FAMILY MEDICINE

## 2019-01-08 PROCEDURE — 4040F PNEUMOC VAC/ADMIN/RCVD: CPT | Performed by: FAMILY MEDICINE

## 2019-01-08 PROCEDURE — G8598 ASA/ANTIPLAT THER USED: HCPCS | Performed by: FAMILY MEDICINE

## 2019-01-08 PROCEDURE — 1090F PRES/ABSN URINE INCON ASSESS: CPT | Performed by: FAMILY MEDICINE

## 2019-01-08 PROCEDURE — 1036F TOBACCO NON-USER: CPT | Performed by: FAMILY MEDICINE

## 2019-01-08 PROCEDURE — 36415 COLL VENOUS BLD VENIPUNCTURE: CPT | Performed by: FAMILY MEDICINE

## 2019-01-08 PROCEDURE — 99213 OFFICE O/P EST LOW 20 MIN: CPT | Performed by: FAMILY MEDICINE

## 2019-01-08 PROCEDURE — G8427 DOCREV CUR MEDS BY ELIG CLIN: HCPCS | Performed by: FAMILY MEDICINE

## 2019-01-08 PROCEDURE — G8482 FLU IMMUNIZE ORDER/ADMIN: HCPCS | Performed by: FAMILY MEDICINE

## 2019-01-08 PROCEDURE — 1123F ACP DISCUSS/DSCN MKR DOCD: CPT | Performed by: FAMILY MEDICINE

## 2019-01-08 PROCEDURE — 1101F PT FALLS ASSESS-DOCD LE1/YR: CPT | Performed by: FAMILY MEDICINE

## 2019-01-11 ENCOUNTER — HOSPITAL ENCOUNTER (OUTPATIENT)
Dept: CT IMAGING | Age: 84
Discharge: HOME | End: 2019-01-11
Payer: MEDICARE

## 2019-01-11 DIAGNOSIS — R41.82 ALTERED MENTAL STATUS, UNSPECIFIED ALTERED MENTAL STATUS TYPE: ICD-10-CM

## 2019-01-11 LAB — METHYLMALONIC ACID: 0.3 UMOL/L (ref 0–0.4)

## 2019-01-11 PROCEDURE — 70450 CT HEAD/BRAIN W/O DYE: CPT

## 2019-01-18 ENCOUNTER — OFFICE VISIT (OUTPATIENT)
Dept: FAMILY MEDICINE CLINIC | Age: 84
End: 2019-01-18
Payer: MEDICARE

## 2019-01-18 VITALS
TEMPERATURE: 98.8 F | OXYGEN SATURATION: 95 % | DIASTOLIC BLOOD PRESSURE: 56 MMHG | WEIGHT: 138 LBS | BODY MASS INDEX: 26.07 KG/M2 | SYSTOLIC BLOOD PRESSURE: 116 MMHG | HEART RATE: 75 BPM

## 2019-01-18 DIAGNOSIS — M15.9 PRIMARY OSTEOARTHRITIS INVOLVING MULTIPLE JOINTS: ICD-10-CM

## 2019-01-18 DIAGNOSIS — G30.1 LATE ONSET ALZHEIMER'S DISEASE WITHOUT BEHAVIORAL DISTURBANCE (HCC): Primary | ICD-10-CM

## 2019-01-18 DIAGNOSIS — F02.80 LATE ONSET ALZHEIMER'S DISEASE WITHOUT BEHAVIORAL DISTURBANCE (HCC): Primary | ICD-10-CM

## 2019-01-18 PROCEDURE — 1101F PT FALLS ASSESS-DOCD LE1/YR: CPT | Performed by: FAMILY MEDICINE

## 2019-01-18 PROCEDURE — 99213 OFFICE O/P EST LOW 20 MIN: CPT | Performed by: FAMILY MEDICINE

## 2019-01-18 PROCEDURE — G8482 FLU IMMUNIZE ORDER/ADMIN: HCPCS | Performed by: FAMILY MEDICINE

## 2019-01-18 PROCEDURE — G8417 CALC BMI ABV UP PARAM F/U: HCPCS | Performed by: FAMILY MEDICINE

## 2019-01-18 PROCEDURE — G8427 DOCREV CUR MEDS BY ELIG CLIN: HCPCS | Performed by: FAMILY MEDICINE

## 2019-01-18 PROCEDURE — G8598 ASA/ANTIPLAT THER USED: HCPCS | Performed by: FAMILY MEDICINE

## 2019-01-18 PROCEDURE — 1090F PRES/ABSN URINE INCON ASSESS: CPT | Performed by: FAMILY MEDICINE

## 2019-01-18 PROCEDURE — 1036F TOBACCO NON-USER: CPT | Performed by: FAMILY MEDICINE

## 2019-01-18 PROCEDURE — 4040F PNEUMOC VAC/ADMIN/RCVD: CPT | Performed by: FAMILY MEDICINE

## 2019-01-18 PROCEDURE — 1123F ACP DISCUSS/DSCN MKR DOCD: CPT | Performed by: FAMILY MEDICINE

## 2019-01-18 RX ORDER — DONEPEZIL HYDROCHLORIDE 5 MG/1
5 TABLET, FILM COATED ORAL NIGHTLY
Qty: 30 TABLET | Refills: 2 | Status: SHIPPED | OUTPATIENT
Start: 2019-01-18 | End: 2019-04-17 | Stop reason: SDUPTHER

## 2019-02-20 ENCOUNTER — TELEPHONE (OUTPATIENT)
Dept: CARDIOLOGY CLINIC | Age: 84
End: 2019-02-20

## 2019-03-06 PROBLEM — M25.561 CHRONIC PAIN OF RIGHT KNEE: Status: RESOLVED | Noted: 2017-03-29 | Resolved: 2019-03-06

## 2019-03-06 PROBLEM — G89.29 CHRONIC PAIN OF RIGHT KNEE: Status: RESOLVED | Noted: 2017-03-29 | Resolved: 2019-03-06

## 2019-03-08 ENCOUNTER — TELEPHONE (OUTPATIENT)
Dept: FAMILY MEDICINE CLINIC | Age: 84
End: 2019-03-08

## 2019-03-18 ENCOUNTER — OFFICE VISIT (OUTPATIENT)
Dept: FAMILY MEDICINE CLINIC | Age: 84
End: 2019-03-18
Payer: MEDICARE

## 2019-03-18 VITALS
DIASTOLIC BLOOD PRESSURE: 68 MMHG | HEIGHT: 61 IN | RESPIRATION RATE: 16 BRPM | HEART RATE: 58 BPM | SYSTOLIC BLOOD PRESSURE: 101 MMHG | WEIGHT: 141 LBS | BODY MASS INDEX: 26.62 KG/M2 | OXYGEN SATURATION: 96 %

## 2019-03-18 DIAGNOSIS — F02.80 LATE ONSET ALZHEIMER'S DISEASE WITHOUT BEHAVIORAL DISTURBANCE (HCC): ICD-10-CM

## 2019-03-18 DIAGNOSIS — G30.1 LATE ONSET ALZHEIMER'S DISEASE WITHOUT BEHAVIORAL DISTURBANCE (HCC): ICD-10-CM

## 2019-03-18 DIAGNOSIS — J43.1 PANLOBULAR EMPHYSEMA (HCC): ICD-10-CM

## 2019-03-18 DIAGNOSIS — I48.20 CHRONIC ATRIAL FIBRILLATION (HCC): Primary | ICD-10-CM

## 2019-03-18 DIAGNOSIS — K21.9 GERD WITHOUT ESOPHAGITIS: ICD-10-CM

## 2019-03-18 PROCEDURE — G8417 CALC BMI ABV UP PARAM F/U: HCPCS | Performed by: FAMILY MEDICINE

## 2019-03-18 PROCEDURE — G8926 SPIRO NO PERF OR DOC: HCPCS | Performed by: FAMILY MEDICINE

## 2019-03-18 PROCEDURE — 99214 OFFICE O/P EST MOD 30 MIN: CPT | Performed by: FAMILY MEDICINE

## 2019-03-18 PROCEDURE — G8482 FLU IMMUNIZE ORDER/ADMIN: HCPCS | Performed by: FAMILY MEDICINE

## 2019-03-18 PROCEDURE — G8427 DOCREV CUR MEDS BY ELIG CLIN: HCPCS | Performed by: FAMILY MEDICINE

## 2019-03-18 PROCEDURE — 1036F TOBACCO NON-USER: CPT | Performed by: FAMILY MEDICINE

## 2019-03-18 PROCEDURE — 1101F PT FALLS ASSESS-DOCD LE1/YR: CPT | Performed by: FAMILY MEDICINE

## 2019-03-18 PROCEDURE — 3023F SPIROM DOC REV: CPT | Performed by: FAMILY MEDICINE

## 2019-03-18 PROCEDURE — G8510 SCR DEP NEG, NO PLAN REQD: HCPCS | Performed by: FAMILY MEDICINE

## 2019-03-18 PROCEDURE — 1123F ACP DISCUSS/DSCN MKR DOCD: CPT | Performed by: FAMILY MEDICINE

## 2019-03-18 PROCEDURE — 1090F PRES/ABSN URINE INCON ASSESS: CPT | Performed by: FAMILY MEDICINE

## 2019-03-18 PROCEDURE — G8598 ASA/ANTIPLAT THER USED: HCPCS | Performed by: FAMILY MEDICINE

## 2019-03-18 PROCEDURE — 4040F PNEUMOC VAC/ADMIN/RCVD: CPT | Performed by: FAMILY MEDICINE

## 2019-03-18 ASSESSMENT — PATIENT HEALTH QUESTIONNAIRE - PHQ9
1. LITTLE INTEREST OR PLEASURE IN DOING THINGS: 0
SUM OF ALL RESPONSES TO PHQ QUESTIONS 1-9: 0
SUM OF ALL RESPONSES TO PHQ QUESTIONS 1-9: 0
SUM OF ALL RESPONSES TO PHQ9 QUESTIONS 1 & 2: 0
2. FEELING DOWN, DEPRESSED OR HOPELESS: 0

## 2019-04-17 RX ORDER — DONEPEZIL HYDROCHLORIDE 5 MG/1
5 TABLET, FILM COATED ORAL NIGHTLY
Qty: 30 TABLET | Refills: 5 | Status: SHIPPED | OUTPATIENT
Start: 2019-04-17 | End: 2019-12-16 | Stop reason: SDUPTHER

## 2019-05-13 RX ORDER — PRAVASTATIN SODIUM 20 MG
TABLET ORAL
Qty: 90 TABLET | Refills: 2 | Status: SHIPPED | OUTPATIENT
Start: 2019-05-13 | End: 2019-06-19 | Stop reason: SDUPTHER

## 2019-05-13 NOTE — TELEPHONE ENCOUNTER
SUPA Pt  Lat office visit 12/19/2018    1. Healthy lifestyle education reviewed including nutrition, exercise and activity  2. Will check 24 hr holter monitor  3. Follow up with me in 6 months  4. Medications reviewed and refilled as warranted  5. She has lost weight eating poorly   Gets phone calls while eating and then forgets to come back to eat.     Upcoming appt 6/19/19

## 2019-06-18 NOTE — PROGRESS NOTES
[facility]     Outpatient Follow Up Note    Subjective:  Chandu Epps is here today for cardiology follow up of CAD, permanent AF, HLD       [place]:  Today she reports one episode of chest pain about 6 months ago. Pain was localized to left chest. See took 2 Nitro and pain resolved. She denies any issues since. She has been active working in the yard a lot staying active denies any issues. Her niece is with her today and confirms she has been very active working in the yard sometimes  7 hours a day. Denies chest pain, shortness of breath, edema, dizziness, palpitations and syncope. She reports having hives last week that resolved on its own no issues since. In January 2019 she was very confused but that episode lasted for a while and resolved. PMH:   stable CAD, chronic afib rate controlled anticoagulated on treatment with statins for hyperlipidemia.     12 point ROS negative in all areas as listed below except as in [address]  Constitutional, EENT, Cardiovascular, pulmonary, GI, , Musculoskeletal, skin, neurological, hematological, endocrine, Psychiatric    Past Medical History:   Diagnosis Date    Atherosclerosis of native coronary artery of native heart without angina pectoris     cath 1/12 , 4/12 (occluded RCa with patent collateral flow    Chronic atrial fibrillation ([address] 1/12    Closed left hip fracture ([address] 1/16    THR    DDD (degenerative disc disease), cervical     Emphysema of lung ([address] 2007    mild    GERD without esophagitis 2001    gerd with stricture    Hyperlipidemia     Osteopenia 8/11    recheck DEXA 2014    Primary osteoarthritis involving multiple joints     Routine health maintenance     TAC 9/99, DEXA 03    Urinary incontinence     urge     Social History:    Social History     Tobacco Use   Smoking Status Former Smoker    Types: Cigarettes   Smokeless Tobacco Never Used   Tobacco Comment    quit in 1990   Family history non contributory  On sister living 2 yrs older apparently doing well in [place]  Current Medications:  Current Outpatient Medications   Medication Sig Dispense Refill    metoprolol tartrate (LOPRESSOR) 25 MG tablet TAKE 1 TABLET TWICE A  tablet 3    pravastatin (PRAVACHOL) 20 MG tablet TAKE 1 TABLET ONCE DAILY 90 tablet 3    rivaroxaban (XARELTO) 20 MG TABS tablet Take 1 tablet by mouth daily Indications: 5-2-17 SAMPLES--LOT # [id number] EXP 8-19 # 35 90 tablet 3    digoxin (LANOXIN) 125 MCG tablet Take 0.5 tablets by mouth daily 45 tablet 3    furosemide (LASIX) 20 MG tablet Take 1 tablet by mouth daily 90 tablet 3    donepezil (ARICEPT) 5 MG tablet TAKE 1 TABLET BY MOUTH NIGHTLY 30 tablet 5    omeprazole (PRILOSEC) 20 MG delayed release capsule TAKE 1 CAPSULE ONCE DAILY 30 capsule 5    oxybutynin (DITROPAN) 5 MG tablet TAKE 1 TABLET THREE TIMES DAILY AS NEEDED 90 tablet 5    nitroGLYCERIN (NITROSTAT) 0.4 MG SL tablet USE 1 TABLET UNDER TONGUE AS NEEDED FOR CHEST PAIN MAY REPEAT EVERY 5M IN. UP TO 3. THEN GO TO ER 25 tablet 0    famotidine (PEPCID) 20 MG tablet TAKE 1 TABLET TWICE A DAY 60 tablet 5    Cholecalciferol (VITAMIN D3) 1000 UNITS TABS Take 1,000 Units by mouth daily        No current facility-administered medications for this visit. REVIEW OF SYSTEMS:    12 point ROS negative in all areas as listed below except as in [place]  Constitutional, EENT, Cardiovascular, pulmonary, GI, , Musculoskeletal, skin, neurological, hematological, endocrine, Psychiatric      Objective:   PHYSICAL EXAM:        VITALS:    /66   Pulse 55   Ht 5' (1.524 m)   Wt 144 lb 6.4 oz (65.5 kg)   SpO2 97%   BMI 28.20 kg/m²   CONSTITUTIONAL: Cooperative, no apparent distress, and appears well nourished / developed  NEUROLOGIC:  Awake and oriented to person, place and time. PSYCH: Calm affect. SKIN: Warm and dry.   HEENT: Sclera non-icteric, normocephalic, neck supple, no elevation of JVP, normal carotid pulses with no bruits and thyroid normal size.  LUNGS:  No increased work of breathing and clear to auscultation, no wheezing  CARDIOVASCULAR: Irregularly irregular rhythm with no murmurs, gallops, rubs, or abnormal heart sounds, normal PMI. The apical impulses not displaced  JVP less than 8 cm H2O  The carotid upstroke is normal in amplitude and contour without delay or bruit  JVP is not elevated  EXT: 1+ BLE edema, no calf tenderness. Pulses are present bilaterally. DATA:    Lab Results   Component Value Date    ALT 11 01/08/2019    AST 24 01/08/2019    ALKPHOS 76 01/08/2019    BILITOT 0.5 01/08/2019     Lab Results   Component Value Date    CREATININE 0.8 01/08/2019    BUN 19 01/08/2019     01/08/2019    K 3.8 01/08/2019    CL 99 01/08/2019    CO2 27 01/08/2019     Lab Results   Component Value Date    TSH 2.03 09/10/2012     Lab Results   Component Value Date    WBC 5.7 01/08/2019    HGB 11.3 (L) 01/08/2019    HCT 34.8 (L) 01/08/2019    MCV 88.3 01/08/2019     01/08/2019     No components found for: CHLPL  Lab Results   Component Value Date    TRIG 85 03/01/2018    TRIG 65 02/16/2017    TRIG 91 06/03/2016     Lab Results   Component Value Date    HDL 63 (H) 03/01/2018    HDL 73 (H) 02/16/2017    HDL 63 (H) 06/03/2016     Lab Results   Component Value Date    LDLCALC 50 03/01/2018    LDLCALC 51 02/16/2017    LDLCALC 45 06/03/2016     Lab Results   Component Value Date    LABVLDL 17 03/01/2018    LABVLDL 13 02/16/2017    LABVLDL 18 06/03/2016     Imaging:    CT head 1/11/19  No evidence of acute intracranial abnormality. EKG 11/13/18  Atrial fibrillationAbnormal ECGWhen compared with ECG of 07-JAN-2016 08:46,No significant change was foundConfirmed by [facility] TALON DAVIS, Zebedee Males ([id number]) on 11/13/2018 11:05:05 AM    CXR 11/13/18  Possible left upper lobe pulmonary nodule. Routine chest CT is recommended for further evaluation. No acute disease. 8/25/16 EKG  A Fib - HR 94    CATH 4/24/12  1.  Remote, non-dominant right coronary artery occlusion with   collateralization. 2. No significant disease along the left coronary artery. 3. Normal left ventricular systolic function. EF 55%  4. No evidence for aortic dissection. Cath 1/2012. [facility] RCA small nondominant 100%. Nonobstructive LAD/LCx.     ECHO from 4/12 showed Normal LV systolic function. EF 55%. Mild aortic valve sclerosis. Biatrial enlargement. Moderate TR with mildly elevated pulmonary pressure. Mild MR. 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 CAD (coronary artery disease) : stable    2. 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 EKG 11/13/18 Atrial fibrillationAbnormal ECGWhen compared with ECG of 07-JAN-2016 08:46,No significant change was foundConfirmed by [facility] TALON DAVIS, Rosaura Maharaj ([id number]) on 11/13/2018 11:05:05 AM   3. HLD - well controlled last lipids 3/1/18 see above results       4. Angina pectoris. stable       Plan:     1. Meds refilled   Most recent labs 1.8.19 K 3.8  2. Check digoxin level and CMP in Jan. 2020  3. Follow up in 6 months   4. She may stay active but do not over do it use good judgement  Prevent falls  She has been bumping her heads. QUALITY MEASURES  1. Tobacco Cessation Counseling: NA  2. Retake of BP if >140/90:   NA  3. Documentation to PCP/referring for new patient:  Sent to PCP at close of office visit  4. CAD patient on anti-platelet: anticoagulated  5. CAD patient on STATIN therapy:  Yes  6.  Patient with CHF and aFib on anticoagulation:  Yes   Veronica Bhatt MD

## 2019-06-19 ENCOUNTER — OFFICE VISIT (OUTPATIENT)
Dept: CARDIOLOGY CLINIC | Age: 84
End: 2019-06-19
Payer: COMMERCIAL

## 2019-06-19 VITALS
DIASTOLIC BLOOD PRESSURE: 66 MMHG | WEIGHT: 144.4 LBS | SYSTOLIC BLOOD PRESSURE: 122 MMHG | OXYGEN SATURATION: 97 % | HEART RATE: 55 BPM | HEIGHT: 60 IN | BODY MASS INDEX: 28.35 KG/M2

## 2019-06-19 DIAGNOSIS — R32 URINARY INCONTINENCE, UNSPECIFIED TYPE: ICD-10-CM

## 2019-06-19 DIAGNOSIS — I48.20 CHRONIC ATRIAL FIBRILLATION (HCC): ICD-10-CM

## 2019-06-19 DIAGNOSIS — I25.10 ATHEROSCLEROSIS OF NATIVE CORONARY ARTERY OF NATIVE HEART WITHOUT ANGINA PECTORIS: Primary | ICD-10-CM

## 2019-06-19 PROCEDURE — 99214 OFFICE O/P EST MOD 30 MIN: CPT | Performed by: INTERNAL MEDICINE

## 2019-06-19 RX ORDER — DIGOXIN 125 MCG
62 TABLET ORAL DAILY
Qty: 45 TABLET | Refills: 3 | Status: SHIPPED | OUTPATIENT
Start: 2019-06-19 | End: 2020-07-13 | Stop reason: SDUPTHER

## 2019-06-19 RX ORDER — FUROSEMIDE 20 MG/1
20 TABLET ORAL DAILY
Qty: 90 TABLET | Refills: 3 | Status: SHIPPED | OUTPATIENT
Start: 2019-06-19 | End: 2020-07-13 | Stop reason: SDUPTHER

## 2019-06-19 RX ORDER — PRAVASTATIN SODIUM 20 MG
TABLET ORAL
Qty: 90 TABLET | Refills: 3 | Status: SHIPPED | OUTPATIENT
Start: 2019-06-19 | End: 2019-12-23

## 2019-06-19 NOTE — LETTER
[address]     Outpatient Follow Up Note    Subjective:  Lindsey Mccullough is here today for cardiology follow up of CAD, permanent AF, HLD       [place]:  Today she reports one episode of chest pain about 6 months ago. Pain was localized to left chest. See took 2 Nitro and pain resolved. She denies any issues since. She has been active working in the yard a lot staying active denies any issues. Her niece is with her today and confirms she has been very active working in the yard sometimes  7 hours a day. Denies chest pain, shortness of breath, edema, dizziness, palpitations and syncope. She reports having hives last week that resolved on its own no issues since. In January 2019 she was very confused but that episode lasted for a while and resolved. PMH:   stable CAD, chronic afib rate controlled anticoagulated on treatment with statins for hyperlipidemia.     12 point ROS negative in all areas as listed below except as in [address]  Constitutional, EENT, Cardiovascular, pulmonary, GI, , Musculoskeletal, skin, neurological, hematological, endocrine, Psychiatric    Past Medical History:   Diagnosis Date    Atherosclerosis of native coronary artery of native heart without angina pectoris     cath 1/12 , 4/12 (occluded RCa with patent collateral flow    Chronic atrial fibrillation ([address] 1/12    Closed left hip fracture ([address] 1/16    THR    DDD (degenerative disc disease), cervical     Emphysema of lung ([address] 2007    mild    GERD without esophagitis 2001    gerd with stricture    Hyperlipidemia     Osteopenia 8/11    recheck DEXA 2014    Primary osteoarthritis involving multiple joints     Routine health maintenance     TAC 9/99, DEXA 03    Urinary incontinence     urge     Social History:    Social History     Tobacco Use   Smoking Status Former Smoker    Types: Cigarettes   Smokeless Tobacco Never Used   Tobacco Comment    quit in 1990 Family history non contributory  On sister living 2 yrs older apparently doing well in [place]  Current Medications:  Current Outpatient Medications   Medication Sig Dispense Refill    metoprolol tartrate (LOPRESSOR) 25 MG tablet TAKE 1 TABLET TWICE A  tablet 3    pravastatin (PRAVACHOL) 20 MG tablet TAKE 1 TABLET ONCE DAILY 90 tablet 3    rivaroxaban (XARELTO) 20 MG TABS tablet Take 1 tablet by mouth daily Indications: 5-2-17 SAMPLES--LOT # [id number] EXP 8-19 # 35 90 tablet 3    digoxin (LANOXIN) 125 MCG tablet Take 0.5 tablets by mouth daily 45 tablet 3    furosemide (LASIX) 20 MG tablet Take 1 tablet by mouth daily 90 tablet 3    donepezil (ARICEPT) 5 MG tablet TAKE 1 TABLET BY MOUTH NIGHTLY 30 tablet 5    omeprazole (PRILOSEC) 20 MG delayed release capsule TAKE 1 CAPSULE ONCE DAILY 30 capsule 5    oxybutynin (DITROPAN) 5 MG tablet TAKE 1 TABLET THREE TIMES DAILY AS NEEDED 90 tablet 5    nitroGLYCERIN (NITROSTAT) 0.4 MG SL tablet USE 1 TABLET UNDER TONGUE AS NEEDED FOR CHEST PAIN MAY REPEAT EVERY 5M IN. UP TO 3. THEN GO TO ER 25 tablet 0    famotidine (PEPCID) 20 MG tablet TAKE 1 TABLET TWICE A DAY 60 tablet 5    Cholecalciferol (VITAMIN D3) 1000 UNITS TABS Take 1,000 Units by mouth daily        No current facility-administered medications for this visit. REVIEW OF SYSTEMS:    12 point ROS negative in all areas as listed below except as in [place]  Constitutional, EENT, Cardiovascular, pulmonary, GI, , Musculoskeletal, skin, neurological, hematological, endocrine, Psychiatric      Objective:   PHYSICAL EXAM:        VITALS:    /66   Pulse 55   Ht 5' (1.524 m)   Wt 144 lb 6.4 oz (65.5 kg)   SpO2 97%   BMI 28.20 kg/m²    CONSTITUTIONAL: Cooperative, no apparent distress, and appears well nourished / developed  NEUROLOGIC:  Awake and oriented to person, place and time. PSYCH: Calm affect. SKIN: Warm and dry.   HEENT: Sclera non-icteric, normocephalic, neck supple, no elevation of JVP, normal carotid pulses with no bruits and thyroid normal size. LUNGS:  No increased work of breathing and clear to auscultation, no wheezing  CARDIOVASCULAR: Irregularly irregular rhythm with no murmurs, gallops, rubs, or abnormal heart sounds, normal PMI. The apical impulses not displaced  JVP less than 8 cm H2O  The carotid upstroke is normal in amplitude and contour without delay or bruit  JVP is not elevated  EXT: 1+ BLE edema, no calf tenderness. Pulses are present bilaterally. DATA:    Lab Results   Component Value Date    ALT 11 01/08/2019    AST 24 01/08/2019    ALKPHOS 76 01/08/2019    BILITOT 0.5 01/08/2019     Lab Results   Component Value Date    CREATININE 0.8 01/08/2019    BUN 19 01/08/2019     01/08/2019    K 3.8 01/08/2019    CL 99 01/08/2019    CO2 27 01/08/2019     Lab Results   Component Value Date    TSH 2.03 09/10/2012     Lab Results   Component Value Date    WBC 5.7 01/08/2019    HGB 11.3 (L) 01/08/2019    HCT 34.8 (L) 01/08/2019    MCV 88.3 01/08/2019     01/08/2019     No components found for: CHLPL  Lab Results   Component Value Date    TRIG 85 03/01/2018    TRIG 65 02/16/2017    TRIG 91 06/03/2016     Lab Results   Component Value Date    HDL 63 (H) 03/01/2018    HDL 73 (H) 02/16/2017    HDL 63 (H) 06/03/2016     Lab Results   Component Value Date    LDLCALC 50 03/01/2018    LDLCALC 51 02/16/2017    LDLCALC 45 06/03/2016     Lab Results   Component Value Date    LABVLDL 17 03/01/2018    LABVLDL 13 02/16/2017    LABVLDL 18 06/03/2016     Imaging:    CT head 1/11/19  No evidence of acute intracranial abnormality. EKG 11/13/18  Atrial fibrillationAbnormal ECGWhen compared with ECG of 07-JAN-2016 08:46,No significant change was foundConfirmed by [facility] TALON DAVIS, Dina Ma ([id number]) on 11/13/2018 11:05:05 AM    CXR 11/13/18  Possible left upper lobe pulmonary nodule. Routine chest CT is recommended for further evaluation. No acute disease.     8/25/16 EKG  A Fib - HR 94 CATH 4/24/12  1. Remote, non-dominant right coronary artery occlusion with   collateralization. 2. No significant disease along the left coronary artery. 3. Normal left ventricular systolic function. EF 55%  4. No evidence for aortic dissection. Cath 1/2012. [facility] RCA small nondominant 100%. Nonobstructive LAD/LCx.     ECHO from 4/12 showed Normal LV systolic function. EF 55%. Mild aortic valve sclerosis. Biatrial enlargement. Moderate TR with mildly elevated pulmonary pressure. Mild MR. 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 CAD (coronary artery disease) : stable    2. 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 EKG 11/13/18 Atrial fibrillationAbnormal ECGWhen compared with ECG of 07-JAN-2016 08:46,No significant change was foundConfirmed by [facility] TALON DAVIS, Bety Ch ([id number]) on 11/13/2018 11:05:05 AM   3. HLD - well controlled last lipids 3/1/18 see above results       4. Angina pectoris. stable       Plan:     1. Meds refilled   Most recent labs 1.8.19 K 3.8  2. Check digoxin level and CMP in Jan. 2020  3. Follow up in 6 months   4. She may stay active but do not over do it use good judgement  Prevent falls  She has been bumping her heads. QUALITY MEASURES  1. Tobacco Cessation Counseling: NA  2. Retake of BP if >140/90:   NA  3. Documentation to PCP/referring for new patient:  Sent to PCP at close of office visit  4. CAD patient on anti-platelet: anticoagulated  5. CAD patient on STATIN therapy:  Yes  6.  Patient with CHF and aFib on anticoagulation:  Yes   Génesis Blood MD

## 2019-06-28 ENCOUNTER — OFFICE VISIT (OUTPATIENT)
Dept: FAMILY MEDICINE CLINIC | Age: 84
End: 2019-06-28
Payer: COMMERCIAL

## 2019-06-28 VITALS
TEMPERATURE: 98.8 F | HEIGHT: 62 IN | BODY MASS INDEX: 26.31 KG/M2 | WEIGHT: 143 LBS | DIASTOLIC BLOOD PRESSURE: 66 MMHG | OXYGEN SATURATION: 95 % | HEART RATE: 64 BPM | SYSTOLIC BLOOD PRESSURE: 118 MMHG

## 2019-06-28 DIAGNOSIS — E78.00 PURE HYPERCHOLESTEROLEMIA: ICD-10-CM

## 2019-06-28 DIAGNOSIS — I48.20 CHRONIC ATRIAL FIBRILLATION (HCC): Primary | ICD-10-CM

## 2019-06-28 DIAGNOSIS — K21.9 GERD WITHOUT ESOPHAGITIS: ICD-10-CM

## 2019-06-28 DIAGNOSIS — G30.1 LATE ONSET ALZHEIMER'S DISEASE WITHOUT BEHAVIORAL DISTURBANCE (HCC): ICD-10-CM

## 2019-06-28 DIAGNOSIS — F02.80 LATE ONSET ALZHEIMER'S DISEASE WITHOUT BEHAVIORAL DISTURBANCE (HCC): ICD-10-CM

## 2019-06-28 LAB
A/G RATIO: 2.2 (ref 1.1–2.2)
ALBUMIN SERPL-MCNC: 4.6 G/DL (ref 3.4–5)
ALP BLD-CCNC: 52 U/L (ref 40–129)
ALT SERPL-CCNC: 11 U/L (ref 10–40)
ANION GAP SERPL CALCULATED.3IONS-SCNC: 14 MMOL/L (ref 3–16)
AST SERPL-CCNC: 20 U/L (ref 15–37)
BASOPHILS ABSOLUTE: 0.1 K/UL (ref 0–0.2)
BASOPHILS RELATIVE PERCENT: 0.9 %
BILIRUB SERPL-MCNC: 0.5 MG/DL (ref 0–1)
BUN BLDV-MCNC: 21 MG/DL (ref 7–20)
CALCIUM SERPL-MCNC: 9.5 MG/DL (ref 8.3–10.6)
CHLORIDE BLD-SCNC: 104 MMOL/L (ref 99–110)
CHOLESTEROL, TOTAL: 118 MG/DL (ref 0–199)
CO2: 25 MMOL/L (ref 21–32)
CREAT SERPL-MCNC: 0.9 MG/DL (ref 0.6–1.2)
DIGOXIN LEVEL: 0.9 NG/ML (ref 0.8–2)
EOSINOPHILS ABSOLUTE: 0.3 K/UL (ref 0–0.6)
EOSINOPHILS RELATIVE PERCENT: 4.2 %
GFR AFRICAN AMERICAN: >60
GFR NON-AFRICAN AMERICAN: 58
GLOBULIN: 2.1 G/DL
GLUCOSE BLD-MCNC: 119 MG/DL (ref 70–99)
HCT VFR BLD CALC: 36.7 % (ref 36–48)
HDLC SERPL-MCNC: 66 MG/DL (ref 40–60)
HEMOGLOBIN: 12.3 G/DL (ref 12–16)
LDL CHOLESTEROL CALCULATED: 40 MG/DL
LYMPHOCYTES ABSOLUTE: 1.1 K/UL (ref 1–5.1)
LYMPHOCYTES RELATIVE PERCENT: 19.1 %
MCH RBC QN AUTO: 30.2 PG (ref 26–34)
MCHC RBC AUTO-ENTMCNC: 33.5 G/DL (ref 31–36)
MCV RBC AUTO: 90 FL (ref 80–100)
MONOCYTES ABSOLUTE: 0.5 K/UL (ref 0–1.3)
MONOCYTES RELATIVE PERCENT: 8.6 %
NEUTROPHILS ABSOLUTE: 4 K/UL (ref 1.7–7.7)
NEUTROPHILS RELATIVE PERCENT: 67.2 %
PDW BLD-RTO: 15.1 % (ref 12.4–15.4)
PLATELET # BLD: 197 K/UL (ref 135–450)
PMV BLD AUTO: 7.6 FL (ref 5–10.5)
POTASSIUM SERPL-SCNC: 4.2 MMOL/L (ref 3.5–5.1)
RBC # BLD: 4.07 M/UL (ref 4–5.2)
SODIUM BLD-SCNC: 143 MMOL/L (ref 136–145)
TOTAL PROTEIN: 6.7 G/DL (ref 6.4–8.2)
TRIGL SERPL-MCNC: 61 MG/DL (ref 0–150)
VLDLC SERPL CALC-MCNC: 12 MG/DL
WBC # BLD: 6 K/UL (ref 4–11)

## 2019-06-28 PROCEDURE — 99214 OFFICE O/P EST MOD 30 MIN: CPT | Performed by: FAMILY MEDICINE

## 2019-06-28 PROCEDURE — 36415 COLL VENOUS BLD VENIPUNCTURE: CPT | Performed by: FAMILY MEDICINE

## 2019-06-28 RX ORDER — MOMETASONE FUROATE 1 MG/G
CREAM TOPICAL
Qty: 1 TUBE | Refills: 0 | Status: SHIPPED | OUTPATIENT
Start: 2019-06-28 | End: 2019-11-13

## 2019-06-28 NOTE — PROGRESS NOTES
Assessment and plan  1. Chronic atrial fibrillation (HCC)  Rate controlled  - CBC Auto Differential  - Digoxin Level    2. Late onset Alzheimer's disease without behavioral disturbance  Stable    3. GERD without esophagitis  Stable    4. Pure hypercholesterolemia   - status pending result of lab  - Comprehensive Metabolic Panel  - Lipid Panel    Healthy Family prevention recommendations given. Continue all current prescription medications as listed below. RTC 4 months or sooner prn. Subjective  Patient returns for reevaluation of her medical problems including atrial fibrillation, Alzheimer's, reflux, and hypercholesterolemia. Her niece is with her and reports her dementia is doing very very well. She is eating much better, is more alert. She still gets forgetful but no significant confusion. She denies any shortness of breath or chest pain. No heartburn or dysphasia. She continues on her statin. Medications: see list below  Allergies   Allergen Reactions    Lipitor      pain    Morphine      confusion    Penicillins Hives    Sulfa Antibiotics Nausea And Vomiting     Past history:  She saw Dr. Kenrick David her cardiologist earlier this month.     Review of systems:  Constitutional:  fatigue - no                                                  abnormal weight loss - no  Respiratory: wheezing - no                       dyspnea on exertion - no  Urologic:  Hematuria - no                   Dysuria - no    Objective  /66   Pulse 64   Temp 98.8 °F (37.1 °C) (Oral)   Ht 5' 2\" (1.575 m)   Wt 143 lb (64.9 kg)   SpO2 95%   BMI 26.16 kg/m²   Patient is alert, oriented, and in no acute distress  Psych:  mood and affect are unremarkable               speech and thought processes appear intact               Behavior and appearance unremarkable  Neck:  No masses, trachea midline, phonation normal   Thyroid - unremarkable              Cervical  adenopathy - nothing significant  Chest:  No deformity of thorax               Respirations easy and unlabored              Lungs - clear to auscultation     Breath sounds - equal  Heart: Rhythm is irregularly irregular with rate controlled. No murmur heard today. No JVD or peripheral pitting edema. Nicolette Zamora MD    Current Outpatient Medications   Medication Sig Dispense Refill    metoprolol tartrate (LOPRESSOR) 25 MG tablet TAKE 1 TABLET TWICE A  tablet 3    pravastatin (PRAVACHOL) 20 MG tablet TAKE 1 TABLET ONCE DAILY 90 tablet 3    rivaroxaban (XARELTO) 20 MG TABS tablet Take 1 tablet by mouth daily Indications: 5-2-17 SAMPLES--LOT # [id number] EXP 8-19 # 35 90 tablet 3    digoxin (LANOXIN) 125 MCG tablet Take 0.5 tablets by mouth daily 45 tablet 3    furosemide (LASIX) 20 MG tablet Take 1 tablet by mouth daily 90 tablet 3    donepezil (ARICEPT) 5 MG tablet TAKE 1 TABLET BY MOUTH NIGHTLY 30 tablet 5    omeprazole (PRILOSEC) 20 MG delayed release capsule TAKE 1 CAPSULE ONCE DAILY 30 capsule 5    oxybutynin (DITROPAN) 5 MG tablet TAKE 1 TABLET THREE TIMES DAILY AS NEEDED 90 tablet 5    nitroGLYCERIN (NITROSTAT) 0.4 MG SL tablet USE 1 TABLET UNDER TONGUE AS NEEDED FOR CHEST PAIN MAY REPEAT EVERY 5M IN. UP TO 3. THEN GO TO ER 25 tablet 0    famotidine (PEPCID) 20 MG tablet TAKE 1 TABLET TWICE A DAY 60 tablet 5    Cholecalciferol (VITAMIN D3) 1000 UNITS TABS Take 1,000 Units by mouth daily        No current facility-administered medications for this visit. The note was completed using Dragon voice recognition transcription. Every effort was made to ensure accuracy; however, inadvertent  transcription errors may be present despite my best efforts to edit errors.

## 2019-06-28 NOTE — PATIENT INSTRUCTIONS
Please read the healthy family handout that you were given and share it with your family. Please compare this printed medication list with your medications at home to be sure they are the same. If you have any medications that are different please contact us immediately at [phone]. Also review your allergies that we have listed, these may also include medications that you have not been able to tolerate, make sure everything listed is correct. If you have any allergies that are different please contact us immediately at [phone].

## 2019-07-19 ENCOUNTER — TELEPHONE (OUTPATIENT)
Dept: FAMILY MEDICINE CLINIC | Age: 84
End: 2019-07-19

## 2019-08-19 RX ORDER — FAMOTIDINE 20 MG/1
TABLET, FILM COATED ORAL
Qty: 60 TABLET | Refills: 5 | Status: SHIPPED | OUTPATIENT
Start: 2019-08-19 | End: 2019-12-16 | Stop reason: SDUPTHER

## 2019-10-25 RX ORDER — OMEPRAZOLE 20 MG/1
CAPSULE, DELAYED RELEASE ORAL
Qty: 30 CAPSULE | Refills: 5 | Status: SHIPPED | OUTPATIENT
Start: 2019-10-25 | End: 2020-08-14

## 2019-11-07 ENCOUNTER — CARE COORDINATION (OUTPATIENT)
Dept: CARE COORDINATION | Age: 84
End: 2019-11-07

## 2019-11-07 SDOH — SOCIAL STABILITY: SOCIAL NETWORK: HOW OFTEN DO YOU ATTENT MEETINGS OF THE CLUB OR ORGANIZATION YOU BELONG TO?: MORE THAN 4 TIMES PER YEAR

## 2019-11-07 SDOH — ECONOMIC STABILITY: TRANSPORTATION INSECURITY
IN THE PAST 12 MONTHS, HAS THE LACK OF TRANSPORTATION KEPT YOU FROM MEDICAL APPOINTMENTS OR FROM GETTING MEDICATIONS?: YES

## 2019-11-07 SDOH — SOCIAL STABILITY: SOCIAL NETWORK: ARE YOU MARRIED, WIDOWED, DIVORCED, SEPARATED, NEVER MARRIED, OR LIVING WITH A PARTNER?: WIDOWED

## 2019-11-07 SDOH — SOCIAL STABILITY: SOCIAL NETWORK
DO YOU BELONG TO ANY CLUBS OR ORGANIZATIONS SUCH AS CHURCH GROUPS UNIONS, FRATERNAL OR ATHLETIC GROUPS, OR SCHOOL GROUPS?: YES

## 2019-11-13 ENCOUNTER — TELEPHONE (OUTPATIENT)
Dept: FAMILY MEDICINE CLINIC | Age: 84
End: 2019-11-13

## 2019-11-15 ENCOUNTER — OFFICE VISIT (OUTPATIENT)
Dept: FAMILY MEDICINE CLINIC | Age: 84
End: 2019-11-15
Payer: MEDICARE

## 2019-11-15 VITALS
BODY MASS INDEX: 26.89 KG/M2 | SYSTOLIC BLOOD PRESSURE: 110 MMHG | TEMPERATURE: 98.6 F | DIASTOLIC BLOOD PRESSURE: 68 MMHG | WEIGHT: 147 LBS | OXYGEN SATURATION: 96 % | HEART RATE: 69 BPM

## 2019-11-15 DIAGNOSIS — I25.10 ATHEROSCLEROSIS OF NATIVE CORONARY ARTERY OF NATIVE HEART WITHOUT ANGINA PECTORIS: ICD-10-CM

## 2019-11-15 DIAGNOSIS — I48.20 CHRONIC ATRIAL FIBRILLATION (HCC): Primary | ICD-10-CM

## 2019-11-15 DIAGNOSIS — G30.1 LATE ONSET ALZHEIMER'S DISEASE WITHOUT BEHAVIORAL DISTURBANCE (HCC): ICD-10-CM

## 2019-11-15 DIAGNOSIS — F02.80 LATE ONSET ALZHEIMER'S DISEASE WITHOUT BEHAVIORAL DISTURBANCE (HCC): ICD-10-CM

## 2019-11-15 DIAGNOSIS — K21.9 GERD WITHOUT ESOPHAGITIS: ICD-10-CM

## 2019-11-15 LAB
ANION GAP SERPL CALCULATED.3IONS-SCNC: 13 MMOL/L (ref 3–16)
BASOPHILS ABSOLUTE: 0 K/UL (ref 0–0.2)
BASOPHILS RELATIVE PERCENT: 0.5 %
BUN BLDV-MCNC: 18 MG/DL (ref 7–20)
CALCIUM SERPL-MCNC: 9.5 MG/DL (ref 8.3–10.6)
CHLORIDE BLD-SCNC: 103 MMOL/L (ref 99–110)
CO2: 27 MMOL/L (ref 21–32)
CREAT SERPL-MCNC: 0.8 MG/DL (ref 0.6–1.2)
DIGOXIN LEVEL: 0.7 NG/ML (ref 0.8–2)
EOSINOPHILS ABSOLUTE: 0.2 K/UL (ref 0–0.6)
EOSINOPHILS RELATIVE PERCENT: 3.6 %
GFR AFRICAN AMERICAN: >60
GFR NON-AFRICAN AMERICAN: >60
GLUCOSE BLD-MCNC: 104 MG/DL (ref 70–99)
HCT VFR BLD CALC: 36.5 % (ref 36–48)
HEMOGLOBIN: 12.1 G/DL (ref 12–16)
LYMPHOCYTES ABSOLUTE: 0.7 K/UL (ref 1–5.1)
LYMPHOCYTES RELATIVE PERCENT: 11.4 %
MCH RBC QN AUTO: 30.3 PG (ref 26–34)
MCHC RBC AUTO-ENTMCNC: 33.1 G/DL (ref 31–36)
MCV RBC AUTO: 91.5 FL (ref 80–100)
MONOCYTES ABSOLUTE: 0.5 K/UL (ref 0–1.3)
MONOCYTES RELATIVE PERCENT: 7.8 %
NEUTROPHILS ABSOLUTE: 4.8 K/UL (ref 1.7–7.7)
NEUTROPHILS RELATIVE PERCENT: 76.7 %
PDW BLD-RTO: 14.4 % (ref 12.4–15.4)
PLATELET # BLD: 219 K/UL (ref 135–450)
PMV BLD AUTO: 7.6 FL (ref 5–10.5)
POTASSIUM SERPL-SCNC: 4.4 MMOL/L (ref 3.5–5.1)
RBC # BLD: 3.99 M/UL (ref 4–5.2)
SODIUM BLD-SCNC: 143 MMOL/L (ref 136–145)
WBC # BLD: 6.3 K/UL (ref 4–11)

## 2019-11-15 PROCEDURE — 1036F TOBACCO NON-USER: CPT | Performed by: FAMILY MEDICINE

## 2019-11-15 PROCEDURE — G8598 ASA/ANTIPLAT THER USED: HCPCS | Performed by: FAMILY MEDICINE

## 2019-11-15 PROCEDURE — 1090F PRES/ABSN URINE INCON ASSESS: CPT | Performed by: FAMILY MEDICINE

## 2019-11-15 PROCEDURE — G8482 FLU IMMUNIZE ORDER/ADMIN: HCPCS | Performed by: FAMILY MEDICINE

## 2019-11-15 PROCEDURE — 36415 COLL VENOUS BLD VENIPUNCTURE: CPT | Performed by: FAMILY MEDICINE

## 2019-11-15 PROCEDURE — 99214 OFFICE O/P EST MOD 30 MIN: CPT | Performed by: FAMILY MEDICINE

## 2019-11-15 PROCEDURE — G0008 ADMIN INFLUENZA VIRUS VAC: HCPCS | Performed by: FAMILY MEDICINE

## 2019-11-15 PROCEDURE — 90653 IIV ADJUVANT VACCINE IM: CPT | Performed by: FAMILY MEDICINE

## 2019-11-15 PROCEDURE — 4040F PNEUMOC VAC/ADMIN/RCVD: CPT | Performed by: FAMILY MEDICINE

## 2019-11-15 PROCEDURE — G8417 CALC BMI ABV UP PARAM F/U: HCPCS | Performed by: FAMILY MEDICINE

## 2019-11-15 PROCEDURE — G8427 DOCREV CUR MEDS BY ELIG CLIN: HCPCS | Performed by: FAMILY MEDICINE

## 2019-11-15 PROCEDURE — 1123F ACP DISCUSS/DSCN MKR DOCD: CPT | Performed by: FAMILY MEDICINE

## 2019-11-21 ENCOUNTER — OFFICE VISIT (OUTPATIENT)
Dept: ORTHOPEDIC SURGERY | Age: 84
End: 2019-11-21
Payer: MEDICARE

## 2019-11-21 DIAGNOSIS — G89.29 CHRONIC PAIN OF RIGHT KNEE: Primary | ICD-10-CM

## 2019-11-21 DIAGNOSIS — M17.11 PRIMARY OSTEOARTHRITIS OF RIGHT KNEE: ICD-10-CM

## 2019-11-21 DIAGNOSIS — M25.561 CHRONIC PAIN OF RIGHT KNEE: Primary | ICD-10-CM

## 2019-11-21 PROCEDURE — 20610 DRAIN/INJ JOINT/BURSA W/O US: CPT | Performed by: ORTHOPAEDIC SURGERY

## 2019-11-21 RX ORDER — METHYLPREDNISOLONE ACETATE 40 MG/ML
40 INJECTION, SUSPENSION INTRA-ARTICULAR; INTRALESIONAL; INTRAMUSCULAR; SOFT TISSUE ONCE
Status: COMPLETED | OUTPATIENT
Start: 2019-11-21 | End: 2019-11-21

## 2019-11-21 RX ADMIN — METHYLPREDNISOLONE ACETATE 40 MG: 40 INJECTION, SUSPENSION INTRA-ARTICULAR; INTRALESIONAL; INTRAMUSCULAR; SOFT TISSUE at 13:18

## 2019-12-16 RX ORDER — FAMOTIDINE 20 MG/1
TABLET, FILM COATED ORAL
Qty: 180 TABLET | Refills: 0 | Status: SHIPPED | OUTPATIENT
Start: 2019-12-16

## 2019-12-16 RX ORDER — OXYBUTYNIN CHLORIDE 5 MG/1
TABLET ORAL
Qty: 270 TABLET | Refills: 0 | Status: SHIPPED | OUTPATIENT
Start: 2019-12-16 | End: 2020-06-11

## 2019-12-16 RX ORDER — DONEPEZIL HYDROCHLORIDE 5 MG/1
5 TABLET, FILM COATED ORAL NIGHTLY
Qty: 90 TABLET | Refills: 0 | Status: SHIPPED | OUTPATIENT
Start: 2019-12-16 | End: 2020-03-13

## 2019-12-16 RX ORDER — NITROGLYCERIN 0.4 MG/1
TABLET SUBLINGUAL
Qty: 25 TABLET | Refills: 0 | Status: SHIPPED | OUTPATIENT
Start: 2019-12-16

## 2019-12-18 ENCOUNTER — OFFICE VISIT (OUTPATIENT)
Dept: CARDIOLOGY CLINIC | Age: 84
End: 2019-12-18
Payer: MEDICARE

## 2019-12-18 VITALS
WEIGHT: 146.7 LBS | OXYGEN SATURATION: 97 % | HEART RATE: 82 BPM | BODY MASS INDEX: 27.7 KG/M2 | DIASTOLIC BLOOD PRESSURE: 54 MMHG | SYSTOLIC BLOOD PRESSURE: 104 MMHG | HEIGHT: 61 IN

## 2019-12-18 DIAGNOSIS — I48.20 CHRONIC ATRIAL FIBRILLATION (HCC): ICD-10-CM

## 2019-12-18 DIAGNOSIS — I25.10 ATHEROSCLEROSIS OF NATIVE CORONARY ARTERY OF NATIVE HEART WITHOUT ANGINA PECTORIS: Primary | ICD-10-CM

## 2019-12-18 DIAGNOSIS — E78.00 PURE HYPERCHOLESTEROLEMIA: ICD-10-CM

## 2019-12-18 PROCEDURE — 1036F TOBACCO NON-USER: CPT | Performed by: INTERNAL MEDICINE

## 2019-12-18 PROCEDURE — 4040F PNEUMOC VAC/ADMIN/RCVD: CPT | Performed by: INTERNAL MEDICINE

## 2019-12-18 PROCEDURE — G8417 CALC BMI ABV UP PARAM F/U: HCPCS | Performed by: INTERNAL MEDICINE

## 2019-12-18 PROCEDURE — G8598 ASA/ANTIPLAT THER USED: HCPCS | Performed by: INTERNAL MEDICINE

## 2019-12-18 PROCEDURE — G8482 FLU IMMUNIZE ORDER/ADMIN: HCPCS | Performed by: INTERNAL MEDICINE

## 2019-12-18 PROCEDURE — 1123F ACP DISCUSS/DSCN MKR DOCD: CPT | Performed by: INTERNAL MEDICINE

## 2019-12-18 PROCEDURE — G8427 DOCREV CUR MEDS BY ELIG CLIN: HCPCS | Performed by: INTERNAL MEDICINE

## 2019-12-18 PROCEDURE — 1090F PRES/ABSN URINE INCON ASSESS: CPT | Performed by: INTERNAL MEDICINE

## 2019-12-18 PROCEDURE — 99214 OFFICE O/P EST MOD 30 MIN: CPT | Performed by: INTERNAL MEDICINE

## 2019-12-20 ENCOUNTER — TELEPHONE (OUTPATIENT)
Dept: ADMINISTRATIVE | Age: 84
End: 2019-12-20

## 2019-12-20 ENCOUNTER — TELEPHONE (OUTPATIENT)
Dept: FAMILY MEDICINE CLINIC | Age: 84
End: 2019-12-20

## 2019-12-23 RX ORDER — PRAVASTATIN SODIUM 20 MG
TABLET ORAL
Qty: 90 TABLET | Refills: 3 | Status: SHIPPED | OUTPATIENT
Start: 2019-12-23 | End: 2020-07-13 | Stop reason: SDUPTHER

## 2020-01-22 ENCOUNTER — CARE COORDINATION (OUTPATIENT)
Dept: CARE COORDINATION | Age: 85
End: 2020-01-22

## 2020-02-17 ENCOUNTER — TELEPHONE (OUTPATIENT)
Dept: FAMILY MEDICINE CLINIC | Age: 85
End: 2020-02-17

## 2020-02-24 ENCOUNTER — TELEPHONE (OUTPATIENT)
Dept: CARDIOLOGY | Age: 85
End: 2020-02-24

## 2020-02-24 ENCOUNTER — OFFICE VISIT (OUTPATIENT)
Dept: FAMILY MEDICINE CLINIC | Age: 85
End: 2020-02-24
Payer: MEDICARE

## 2020-02-24 VITALS
WEIGHT: 147.2 LBS | BODY MASS INDEX: 27.81 KG/M2 | SYSTOLIC BLOOD PRESSURE: 115 MMHG | TEMPERATURE: 98.2 F | DIASTOLIC BLOOD PRESSURE: 76 MMHG | HEART RATE: 76 BPM | OXYGEN SATURATION: 93 %

## 2020-02-24 LAB
A/G RATIO: 2.2 (ref 1.1–2.2)
ALBUMIN SERPL-MCNC: 4.3 G/DL (ref 3.4–5)
ALP BLD-CCNC: 59 U/L (ref 40–129)
ALT SERPL-CCNC: 12 U/L (ref 10–40)
ANION GAP SERPL CALCULATED.3IONS-SCNC: 13 MMOL/L (ref 3–16)
AST SERPL-CCNC: 19 U/L (ref 15–37)
BASOPHILS ABSOLUTE: 0 K/UL (ref 0–0.2)
BASOPHILS RELATIVE PERCENT: 0.6 %
BILIRUB SERPL-MCNC: <0.2 MG/DL (ref 0–1)
BUN BLDV-MCNC: 20 MG/DL (ref 7–20)
CALCIUM SERPL-MCNC: 9.1 MG/DL (ref 8.3–10.6)
CHLORIDE BLD-SCNC: 101 MMOL/L (ref 99–110)
CO2: 28 MMOL/L (ref 21–32)
CREAT SERPL-MCNC: 0.8 MG/DL (ref 0.6–1.2)
DIGOXIN LEVEL: 0.5 NG/ML (ref 0.8–2)
EOSINOPHILS ABSOLUTE: 0.3 K/UL (ref 0–0.6)
EOSINOPHILS RELATIVE PERCENT: 4.7 %
GFR AFRICAN AMERICAN: >60
GFR NON-AFRICAN AMERICAN: >60
GLOBULIN: 2 G/DL
GLUCOSE BLD-MCNC: 108 MG/DL (ref 70–99)
HCT VFR BLD CALC: 36.8 % (ref 36–48)
HEMOGLOBIN: 12.2 G/DL (ref 12–16)
LYMPHOCYTES ABSOLUTE: 1 K/UL (ref 1–5.1)
LYMPHOCYTES RELATIVE PERCENT: 16.1 %
MCH RBC QN AUTO: 30.1 PG (ref 26–34)
MCHC RBC AUTO-ENTMCNC: 33.1 G/DL (ref 31–36)
MCV RBC AUTO: 91.1 FL (ref 80–100)
MONOCYTES ABSOLUTE: 0.6 K/UL (ref 0–1.3)
MONOCYTES RELATIVE PERCENT: 9.2 %
NEUTROPHILS ABSOLUTE: 4.2 K/UL (ref 1.7–7.7)
NEUTROPHILS RELATIVE PERCENT: 69.4 %
PDW BLD-RTO: 14.8 % (ref 12.4–15.4)
PLATELET # BLD: 199 K/UL (ref 135–450)
PMV BLD AUTO: 7.9 FL (ref 5–10.5)
POTASSIUM SERPL-SCNC: 3.8 MMOL/L (ref 3.5–5.1)
RBC # BLD: 4.04 M/UL (ref 4–5.2)
SODIUM BLD-SCNC: 142 MMOL/L (ref 136–145)
TOTAL PROTEIN: 6.3 G/DL (ref 6.4–8.2)
WBC # BLD: 6 K/UL (ref 4–11)

## 2020-02-24 PROCEDURE — 36415 COLL VENOUS BLD VENIPUNCTURE: CPT | Performed by: NURSE PRACTITIONER

## 2020-02-24 PROCEDURE — G8427 DOCREV CUR MEDS BY ELIG CLIN: HCPCS | Performed by: NURSE PRACTITIONER

## 2020-02-24 PROCEDURE — 1123F ACP DISCUSS/DSCN MKR DOCD: CPT | Performed by: NURSE PRACTITIONER

## 2020-02-24 PROCEDURE — 3023F SPIROM DOC REV: CPT | Performed by: NURSE PRACTITIONER

## 2020-02-24 PROCEDURE — 4040F PNEUMOC VAC/ADMIN/RCVD: CPT | Performed by: NURSE PRACTITIONER

## 2020-02-24 PROCEDURE — G8926 SPIRO NO PERF OR DOC: HCPCS | Performed by: NURSE PRACTITIONER

## 2020-02-24 PROCEDURE — 1036F TOBACCO NON-USER: CPT | Performed by: NURSE PRACTITIONER

## 2020-02-24 PROCEDURE — G8417 CALC BMI ABV UP PARAM F/U: HCPCS | Performed by: NURSE PRACTITIONER

## 2020-02-24 PROCEDURE — 99214 OFFICE O/P EST MOD 30 MIN: CPT | Performed by: NURSE PRACTITIONER

## 2020-02-24 PROCEDURE — 1090F PRES/ABSN URINE INCON ASSESS: CPT | Performed by: NURSE PRACTITIONER

## 2020-02-24 PROCEDURE — 93000 ELECTROCARDIOGRAM COMPLETE: CPT | Performed by: NURSE PRACTITIONER

## 2020-02-24 PROCEDURE — G8482 FLU IMMUNIZE ORDER/ADMIN: HCPCS | Performed by: NURSE PRACTITIONER

## 2020-02-24 NOTE — TELEPHONE ENCOUNTER
[facility] office called states Ena Hirsch is scheduled to have eyelid lift surgery done on 2/27/20 with Dr Aishwarya Moreno with [facility] December 18, 2019 will you give clearance. Please notify Dalila Raquel at [facility] office [phone]  Please advise RKG OOT     Assessment:       1. CAD (coronary artery disease) : stable    2. 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 EKG 11/13/18 Atrial fibrillationAbnormal ECGWhen compared with ECG of 07-JAN-2016 08:46,No significant change was foundConfirmed by [facility] TALON DAVIS, Maribel Curry ([id number]) on 11/13/2018 11:05:05 AM   3. HLD - well controlled last lipids 6/28/19 see above results         4. Angina pectoris. stable        Plan:   1. Meds reviewed. Refills as warranted   [facility] 2019 reviewed and all look fine  2. No changes today. Continue current medications   3.  Follow up in 6 months

## 2020-02-24 NOTE — PROGRESS NOTES
Subjective:    Patient:  Matthias Covarrubias   YOB: 1926     Patient presents for preoperative history and physical in preparation for bilateral ptosis repair (upper lids). Patient reports she feels well and denies any acute problems or concerns today. There is no significant personal or family history of abnormal bleeding or anesthesia complications. Past Medical History:   Diagnosis Date    Atherosclerosis of native coronary artery of native heart without angina pectoris     cath 1/12 , 4/12 (occluded RCa with patent collateral flow    Chronic atrial fibrillation 1/12    Closed left hip fracture (HCC) 1/16    THR    DDD (degenerative disc disease), cervical     Emphysema of lung ([facility] [address] 2007    mild    GERD without esophagitis 2001    gerd with stricture    Hyperlipidemia     Osteopenia 8/11    recheck DEXA 2014    Primary osteoarthritis involving multiple joints     Routine health maintenance     TAC 9/99, DEXA 03    Urinary incontinence     urge        Past Surgical History:   Procedure Laterality Date    APPENDECTOMY      CATARACT REMOVAL      CHOLECYSTECTOMY      COLONOSCOPY  6/12    Dr Dinah Littlejohn, COLON, DIAGNOSTIC      HYSTERECTOMY      JOINT REPLACEMENT  01/2016    left hip    JOINT REPLACEMENT Left 09/23/2016    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        Outpatient Medications Marked as Taking for the 2/24/20 encounter (Office Visit) with TYLER Love CNP   Medication Sig Dispense Refill    pravastatin (PRAVACHOL) 20 MG tablet TAKE 1 TABLET ONCE DAILY 90 tablet 3    donepezil (ARICEPT) 5 MG tablet TAKE 1 TABLET BY MOUTH NIGHTLY 90 tablet 0    oxybutynin (DITROPAN) 5 MG tablet TAKE 1 TABLET THREE TIMES DAILY AS NEEDED 270 tablet 0    famotidine (PEPCID) 20 MG tablet TAKE 1 TABLET TWICE A  tablet 0    nitroGLYCERIN (NITROSTAT) 0.4 MG SL tablet USE 1 TABLET UNDER TONGUE AS NEEDED FOR CHEST PAIN MAY REPEAT EVERY 5M IN.  UP TO systems:  Constitutional:     Unexplained weight loss - no     Fever - no  Skin:     Rash - no     Itching - no  ENT:     Head congestion - no     Hearing loss - hard of hearing  Eye:     Blurred vision - no     Eye pain - no  Cardiac:     Chest pain or discomfort - no     Orthopnea or PND - no  Respiratory     Cough - occasional chronic related to emphysema     Wheeze - no     Dyspnea on exertion - no  Gastrointestinal     Frequent heartburn - no     Blood in stool - no  Urologic     Dysuria - no     Hematuria - no  Neurologic     Dizziness - no     Syncope - no  Psychiatric     Suicidal ideation - no     Anxiety - no    Objective:  /76   Pulse 76   Temp 98.2 °F (36.8 °C) (Oral)   Wt 147 lb 3.2 oz (66.8 kg)   SpO2 93%   BMI 27.81 kg/m²   Patient is alert, oriented, and in no acute distress.   Eyes:  Conjunctivae and lids are clear             Pupils are equal, round, and reactive  Ears:  External ears and nose unremarkable, canals are clear, TMs clear   Mouth:  Lips, gums, tongue, oral mucosa unremarkable  Throat: Palates unremarkable               Pharynx clear  Nose: clear  Neck:  No masses, trachea midline, phonation normal, thyroid unremarkable              No significant cervical or supraclavicular adenopathy  Chest: Kyphosis              Respirations easy and unlabored with equal breath sounds              Lungs clear  Heart:  Rhythm - occasionally irregular              Murmurs - none              Gallop - none               JVD - none              Pretibial edema - none  Abdomen:  Soft  with no masses noted                     Hernia - none                    Liver and spleen not palpably enlarged                    Bowel sounds are normally active                    Tenderness - none                    Rebound or rididity - none  Neurologic:  Cranial nerves 2-12 are intact                      No focal motor deficits found                        Reflexes in upper extremities are intact and symmetrical                      Reflexes in lower extremities are intact and symmetrical  Skin: Warm and dry, turgor normal           No rash            No lesion  Psychiatric: mood and affect intact                     speech and thought processes seem appropriate     Assessment and Plan:   Diagnosis Orders   1. Preop examination   patient presents today for preoperative history and physical in preparation for bilateral upper eyelid repair due to ptosis. Patient is scheduled for local/MAC anesthesia. Patient denies any personal or family history of complication with anesthesia or bleeding tendencies. Patient does have a history of A. fib and is currently on Xarelto. Patient reports she stopped Xarelto 2 days ago. Patient also with a history of emphysema, hyperlipidemia, coronary artery disease, GERD, degenerative disc disease and osteoarthritis. Severe kyphosis noted on exam and bilateral upper lid ptosis otherwise exam is benign. Contacted cardiology for cardiac clearance. Dr. Vianney Talavera is out of country however Dr. Javi Sands is covering. Order for CBC, CMP, EKG and dig level today. EKG shows A. Fib and is comparable to EKG completed 11/18. Assuming Cardiology provides clearance, patient may proceed with anesthesia as planned. If you have any questions or concerns please contact our office at [phone]. CBC Auto Differential    Comprehensive Metabolic Panel    EKG 12 Lead   2. Ptosis of both upper eyelids     3. Chronic atrial fibrillation  Chronic A. fib. Stopped Xarelto 2 days ago per patient. 4. Atherosclerosis of native coronary artery of native heart without angina pectoris  Stable. 5. Pure hypercholesterolemia  Reviewed lipids from 6/28/19 which indicate good control with current treatment.     6. Panlobular emphysema ([address]     7. Late onset Alzheimer's disease without behavioral disturbance ([address]  Short term memory loss and difficulty with word finding on occasion however good history and able to verbalized needs/concerns. Caregiver also present during visit. 8. Medication management  DIGOXIN LEVEL           Averyendlyrose Choudhary, CNP    The note was completed using Dragon voice recognition transcription. Every effort was made to ensure accuracy; however, inadvertent  transcription errors may be present despite my best efforts to edit errors.

## 2020-03-12 ENCOUNTER — CARE COORDINATION (OUTPATIENT)
Dept: FAMILY MEDICINE CLINIC | Age: 85
End: 2020-03-12

## 2020-03-12 RX ORDER — M-VIT,TX,IRON,MINS/CALC/FOLIC 27MG-0.4MG
1 TABLET ORAL DAILY
COMMUNITY
End: 2020-03-20

## 2020-03-12 SDOH — HEALTH STABILITY: PHYSICAL HEALTH: ON AVERAGE, HOW MANY MINUTES DO YOU ENGAGE IN EXERCISE AT THIS LEVEL?: 0 MIN

## 2020-03-12 SDOH — ECONOMIC STABILITY: TRANSPORTATION INSECURITY
IN THE PAST 12 MONTHS, HAS THE LACK OF TRANSPORTATION KEPT YOU FROM MEDICAL APPOINTMENTS OR FROM GETTING MEDICATIONS?: NO

## 2020-03-12 SDOH — SOCIAL STABILITY: SOCIAL NETWORK: HOW OFTEN DO YOU ATTEND CHURCH OR RELIGIOUS SERVICES?: MORE THAN 4 TIMES PER YEAR

## 2020-03-12 SDOH — HEALTH STABILITY: MENTAL HEALTH
STRESS IS WHEN SOMEONE FEELS TENSE, NERVOUS, ANXIOUS, OR CAN'T SLEEP AT NIGHT BECAUSE THEIR MIND IS TROUBLED. HOW STRESSED ARE YOU?: NOT AT ALL

## 2020-03-12 SDOH — SOCIAL STABILITY: SOCIAL NETWORK: HOW OFTEN DO YOU GET TOGETHER WITH FRIENDS OR RELATIVES?: ONCE A WEEK

## 2020-03-12 SDOH — ECONOMIC STABILITY: INCOME INSECURITY: HOW HARD IS IT FOR YOU TO PAY FOR THE VERY BASICS LIKE FOOD, HOUSING, MEDICAL CARE, AND HEATING?: NOT HARD AT ALL

## 2020-03-12 SDOH — ECONOMIC STABILITY: FOOD INSECURITY: WITHIN THE PAST 12 MONTHS, YOU WORRIED THAT YOUR FOOD WOULD RUN OUT BEFORE YOU GOT MONEY TO BUY MORE.: NEVER TRUE

## 2020-03-12 SDOH — ECONOMIC STABILITY: TRANSPORTATION INSECURITY
IN THE PAST 12 MONTHS, HAS LACK OF TRANSPORTATION KEPT YOU FROM MEETINGS, WORK, OR FROM GETTING THINGS NEEDED FOR DAILY LIVING?: NO

## 2020-03-12 SDOH — HEALTH STABILITY: PHYSICAL HEALTH: ON AVERAGE, HOW MANY DAYS PER WEEK DO YOU ENGAGE IN MODERATE TO STRENUOUS EXERCISE (LIKE A BRISK WALK)?: 0 DAYS

## 2020-03-12 SDOH — SOCIAL STABILITY: SOCIAL NETWORK: IN A TYPICAL WEEK, HOW MANY TIMES DO YOU TALK ON THE PHONE WITH FAMILY, FRIENDS, OR NEIGHBORS?: ONCE A WEEK

## 2020-03-12 SDOH — ECONOMIC STABILITY: FOOD INSECURITY: WITHIN THE PAST 12 MONTHS, THE FOOD YOU BOUGHT JUST DIDN'T LAST AND YOU DIDN'T HAVE MONEY TO GET MORE.: NEVER TRUE

## 2020-03-13 RX ORDER — DONEPEZIL HYDROCHLORIDE 5 MG/1
5 TABLET, FILM COATED ORAL NIGHTLY
Qty: 90 TABLET | Refills: 1 | Status: SHIPPED | OUTPATIENT
Start: 2020-03-13 | End: 2020-09-30

## 2020-03-13 NOTE — TELEPHONE ENCOUNTER
Refilled medication per verbal order from provider.   Future appt scheduled 03/23/2020  Last appt 02/24/2020

## 2020-03-17 NOTE — CARE COORDINATION
Patient is scheduled for appointment on 3/23/2020 for 4 month f/u
Cholecalciferol (D3 VITAMIN PO) Take by mouth   Yes Historical Provider, MD   Multiple Vitamins-Minerals (THERAPEUTIC MULTIVITAMIN-MINERALS) tablet Take 1 tablet by mouth daily   Yes Historical Provider, MD   pravastatin (PRAVACHOL) 20 MG tablet TAKE 1 TABLET ONCE DAILY 12/23/19  Yes Keith James MD   donepezil (ARICEPT) 5 MG tablet TAKE 1 TABLET BY MOUTH NIGHTLY 12/16/19  Yes Katie Castellano MD   oxybutynin (DITROPAN) 5 MG tablet TAKE 1 TABLET THREE TIMES DAILY AS NEEDED 12/16/19  Yes Katie Castellano MD   famotidine (PEPCID) 20 MG tablet TAKE 1 TABLET TWICE A DAY 12/16/19  Yes Katie Castellano MD   omeprazole (PRILOSEC) 20 MG delayed release capsule TAKE 1 CAPSULE BY MOUTH EVERY DAY 10/25/19  Yes Katie Castellano MD   metoprolol tartrate (LOPRESSOR) 25 MG tablet TAKE 1 TABLET TWICE A DAY 6/19/19  Yes Jane Flores MD   rivaroxaban (XARELTO) 20 MG TABS tablet Take 1 tablet by mouth daily Indications: 5-2-17 SAMPLES--LOT # [id number] EXP 8-19 # 35 6/19/19  Yes Jane Flores MD   digoxin (LANOXIN) 125 MCG tablet Take 0.5 tablets by mouth daily 6/19/19  Yes Jane Flores MD   furosemide (LASIX) 20 MG tablet Take 1 tablet by mouth daily 6/19/19  Yes Jane Flores MD   nitroGLYCERIN (NITROSTAT) 0.4 MG SL tablet USE 1 TABLET UNDER TONGUE AS NEEDED FOR CHEST PAIN MAY REPEAT EVERY 5M IN. UP TO 3.  THEN GO TO ER  Patient not taking: Reported on 3/12/2020 12/16/19   Katie Castellano MD   Oxybutynin Chloride (DITROPAN) 5 MG TABS  3/6/14 3/25/15  Katie Castellano MD       Future Appointments   Date Time Provider James Dee   3/23/2020  3:00 PM Katie Castellano MD [facility]

## 2020-03-23 ENCOUNTER — OFFICE VISIT (OUTPATIENT)
Dept: FAMILY MEDICINE CLINIC | Age: 85
End: 2020-03-23
Payer: MEDICARE

## 2020-03-23 VITALS
TEMPERATURE: 98.2 F | HEART RATE: 72 BPM | WEIGHT: 148 LBS | OXYGEN SATURATION: 96 % | BODY MASS INDEX: 27.96 KG/M2 | SYSTOLIC BLOOD PRESSURE: 138 MMHG | DIASTOLIC BLOOD PRESSURE: 69 MMHG

## 2020-03-23 LAB — MAGNESIUM: 2.1 MG/DL (ref 1.8–2.4)

## 2020-03-23 PROCEDURE — 99214 OFFICE O/P EST MOD 30 MIN: CPT | Performed by: FAMILY MEDICINE

## 2020-03-23 ASSESSMENT — PATIENT HEALTH QUESTIONNAIRE - PHQ9
2. FEELING DOWN, DEPRESSED OR HOPELESS: 0
SUM OF ALL RESPONSES TO PHQ9 QUESTIONS 1 & 2: 0
1. LITTLE INTEREST OR PLEASURE IN DOING THINGS: 0
SUM OF ALL RESPONSES TO PHQ QUESTIONS 1-9: 0
SUM OF ALL RESPONSES TO PHQ QUESTIONS 1-9: 0

## 2020-03-23 NOTE — PROGRESS NOTES
Noted    Late onset Alzheimer's disease without behavioral disturbance ([facility] [address] 01/18/2019     Priority: High    GERD without esophagitis      Priority: High     gerd with stricture      Atherosclerosis of native coronary artery of native heart without angina pectoris      Priority: High     cath 1/12 , 4/12 (occluded RCa with patent collateral flow), Dr Díaz How      Chronic atrial fibrillation      Priority: High     Xarelto  Last dig level: 11/15/2019       Primary osteoarthritis of both knees 03/09/2016     Priority: Medium     Left TKR 9/16      Primary osteoarthritis involving multiple joints      Priority: Medium     Tramadol use in past      Urinary incontinence      Priority: Medium     urge      Pure hypercholesterolemia 03/01/2016     Priority: Low     Last lipid: 6/28/2019       Panlobular emphysema ([facility] [address]      Priority: Low     mild      Tachy-guanaco syndrome ([facility] [address] 11/13/2018    Primary osteoarthritis of right knee 03/29/2017    Chronic pain of right knee 03/29/2017    DDD (degenerative disc disease), cervical        Allergies   Allergen Reactions    Lipitor      pain    Morphine      confusion    Penicillins Hives    Sulfa Antibiotics Nausea And Vomiting       Health Maintenance   Topic Date Due    Annual Wellness Visit (AWV)  09/06/2019    Shingles Vaccine (2 of 3) 06/26/2020 (Originally 3/2/2010)    Lipid screen  06/28/2020    Potassium monitoring  02/24/2021    Creatinine monitoring  02/24/2021    DTaP/Tdap/Td vaccine (2 - Td) 07/17/2027    Flu vaccine  Completed    Pneumococcal 65+ years Vaccine  Completed    Hepatitis A vaccine  Aged Out    Hepatitis B vaccine  Aged Out    Hib vaccine  Aged Out    Meningococcal (ACWY) vaccine  Aged Out       The note was completed using Dragon voice recognition transcription. Every effort was made to ensure accuracy; however, inadvertent  transcription errors may be present despite my best efforts to edit errors.

## 2020-03-23 NOTE — PATIENT INSTRUCTIONS
Please read the healthy family handout that you were given and share it with your family. Please compare this printed medication list with your medications at home to be sure they are the same. If you have any medications that are different please contact us immediately at [phone]. Also review your allergies that we have listed, these may also include medications that you have not been able to tolerate, make sure everything listed is correct. If you have any allergies that are different please contact us immediately at [phone].

## 2020-06-11 RX ORDER — OXYBUTYNIN CHLORIDE 5 MG/1
TABLET ORAL
Qty: 270 TABLET | Refills: 1 | Status: SHIPPED | OUTPATIENT
Start: 2020-06-11 | End: 2021-04-09

## 2020-06-12 RX ORDER — CIMETIDINE 300 MG/1
300 TABLET, FILM COATED ORAL 2 TIMES DAILY
Qty: 60 TABLET | Refills: 5 | Status: SHIPPED | OUTPATIENT
Start: 2020-06-12 | End: 2021-03-01

## 2020-06-15 ENCOUNTER — TELEPHONE (OUTPATIENT)
Dept: FAMILY MEDICINE CLINIC | Age: 85
End: 2020-06-15

## 2020-06-15 NOTE — TELEPHONE ENCOUNTER
Patient c/o cramps in hands, feet and legs. She said she stopped the Pravastatin but that only helped a little.   She is wanting to know if there is anything you can prescribe for this

## 2020-06-16 RX ORDER — CALCIUM CARBONATE 300MG(750)
1 TABLET,CHEWABLE ORAL DAILY
Qty: 30 TABLET | Refills: 0 | Status: SHIPPED | OUTPATIENT
Start: 2020-06-16 | End: 2020-07-24 | Stop reason: SDUPTHER

## 2020-06-16 NOTE — TELEPHONE ENCOUNTER
Try magnesium 400 mg qd, #30  Assure she is drinking enough water to keep her urine only pale yellow

## 2020-06-27 ENCOUNTER — TELEPHONE (OUTPATIENT)
Dept: FAMILY MEDICINE CLINIC | Age: 85
End: 2020-06-27

## 2020-06-29 ENCOUNTER — TELEPHONE (OUTPATIENT)
Dept: CARDIOLOGY CLINIC | Age: 85
End: 2020-06-29

## 2020-06-29 NOTE — TELEPHONE ENCOUNTER
Pt called on answering service needs an appt with [facility]. Also, needs to know what she needs to do about her Xarelto if she goes to the dentist for teeth extractions?  Pls call to advise Thank you

## 2020-06-30 NOTE — TELEPHONE ENCOUNTER
Dr. Taran Turner - patient is having some teeth pulled and is asking if she is okay to hold xarelto. Her last OV with you was 12/2019. I tried calling her back on both available numbers to make an appointment and there was no answer and no VM set up. We will keep trying to reach her.

## 2020-07-13 ENCOUNTER — OFFICE VISIT (OUTPATIENT)
Dept: CARDIOLOGY CLINIC | Age: 85
End: 2020-07-13
Payer: MEDICARE

## 2020-07-13 VITALS
DIASTOLIC BLOOD PRESSURE: 74 MMHG | BODY MASS INDEX: 27.94 KG/M2 | HEIGHT: 61 IN | HEART RATE: 67 BPM | OXYGEN SATURATION: 96 % | WEIGHT: 148 LBS | SYSTOLIC BLOOD PRESSURE: 118 MMHG

## 2020-07-13 PROCEDURE — 99214 OFFICE O/P EST MOD 30 MIN: CPT | Performed by: INTERNAL MEDICINE

## 2020-07-13 RX ORDER — FUROSEMIDE 20 MG/1
20 TABLET ORAL DAILY
Qty: 90 TABLET | Refills: 3 | Status: SHIPPED | OUTPATIENT
Start: 2020-07-13 | End: 2021-08-03 | Stop reason: SDUPTHER

## 2020-07-13 RX ORDER — PRAVASTATIN SODIUM 20 MG
TABLET ORAL
Qty: 90 TABLET | Refills: 3 | Status: SHIPPED | OUTPATIENT
Start: 2020-07-13 | End: 2021-08-03 | Stop reason: SDUPTHER

## 2020-07-13 RX ORDER — DIGOXIN 125 MCG
62 TABLET ORAL DAILY
Qty: 45 TABLET | Refills: 3 | Status: SHIPPED | OUTPATIENT
Start: 2020-07-13 | End: 2021-07-27

## 2020-07-13 NOTE — PROGRESS NOTES
[facility]     Outpatient Follow Up Note    Subjective:  Carlyle Castro is here today for cardiology follow up of CAD, permanent AF, HLD       [place]:   Today she reports recently doing work outside, [address] In the garden and weeding flowers. She developed CP took 2 nitro which relived the pain. She has had intermittent BLE edema she has been taking a couple times a week which has helped with edema. Otherwise she reports doing ok denies , shortness of breath, edema, dizziness, palpitations and syncope. PMH:   stable CAD, chronic afib rate controlled anticoagulated on treatment with statins for hyperlipidemia.     12 point ROS negative in all areas as listed below except as in [address]  Constitutional, EENT, Cardiovascular, pulmonary, GI, , Musculoskeletal, skin, neurological, hematological, endocrine, Psychiatric    Past Medical History:   Diagnosis Date    Atherosclerosis of native coronary artery of native heart without angina pectoris     cath 1/12 , 4/12 (occluded RCa with patent collateral flow    Chronic atrial fibrillation 1/12    Closed left hip fracture ([address] 1/16    THR    DDD (degenerative disc disease), cervical     Emphysema of lung ([address] 2007    mild    GERD without esophagitis 2001    gerd with stricture    Hyperlipidemia     Osteopenia 8/11    recheck DEXA 2014    Primary osteoarthritis involving multiple joints     Routine health maintenance     TAC 9/99, DEXA 03    Urinary incontinence     urge     Social History:    Social History     Tobacco Use   Smoking Status Former Smoker    Types: Cigarettes   Smokeless Tobacco Never Used   Tobacco Comment    quit in 1990   Family history non contributory  On sister living 2 yrs older apparently doing well in [place]  Current Medications:  Current Outpatient Medications   Medication Sig Dispense Refill    Magnesium 400 MG TABS Take 1 tablet by mouth Daily 30 tablet 0    cimetidine (TAGAMET) 300 MG tablet Take 1 tablet by mouth 2 times daily 60 tablet 5    oxybutynin (DITROPAN) 5 MG tablet TAKE 1 TABLET THREE TIMES DAILY AS NEEDED 270 tablet 1    donepezil (ARICEPT) 5 MG tablet TAKE 1 TABLET BY MOUTH NIGHTLY 90 tablet 1    Cholecalciferol (D3 VITAMIN PO) Take by mouth      pravastatin (PRAVACHOL) 20 MG tablet TAKE 1 TABLET ONCE DAILY 90 tablet 3    famotidine (PEPCID) 20 MG tablet TAKE 1 TABLET TWICE A  tablet 0    nitroGLYCERIN (NITROSTAT) 0.4 MG SL tablet USE 1 TABLET UNDER TONGUE AS NEEDED FOR CHEST PAIN MAY REPEAT EVERY 5M IN. UP TO 3. THEN GO TO ER 25 tablet 0    omeprazole (PRILOSEC) 20 MG delayed release capsule TAKE 1 CAPSULE BY MOUTH EVERY DAY 30 capsule 5    metoprolol tartrate (LOPRESSOR) 25 MG tablet TAKE 1 TABLET TWICE A  tablet 3    rivaroxaban (XARELTO) 20 MG TABS tablet Take 1 tablet by mouth daily Indications: 5-2-17 SAMPLES--LOT # [id number] EXP 8-19 # 35 90 tablet 3    digoxin (LANOXIN) 125 MCG tablet Take 0.5 tablets by mouth daily 45 tablet 3    furosemide (LASIX) 20 MG tablet Take 1 tablet by mouth daily 90 tablet 3     No current facility-administered medications for this visit. REVIEW OF SYSTEMS:    12 point ROS negative in all areas as listed below except as in [place]  Constitutional, EENT, Cardiovascular, pulmonary, GI, , Musculoskeletal, skin, neurological, hematological, endocrine, Psychiatric      Objective:   PHYSICAL EXAM:        VITALS:    There were no vitals taken for this visit. CONSTITUTIONAL: Cooperative, no apparent distress, and appears well nourished / developed  NEUROLOGIC:  Awake and oriented to person, place and time. PSYCH: Calm affect. SKIN: Warm and dry. HEENT: Sclera non-icteric, normocephalic, neck supple, no elevation of JVP, normal carotid pulses with no bruits and thyroid normal size. LUNGS:  No increased work of breathing  , no wheezing  CARDIOVASCULAR: Irregularly irregular rhythm with no murmurs, gallops, rubs, or abnormal heart sounds, normal PMI. The apical impulses not displaced  JVP less than 8 cm H2O  The carotid upstroke is normal in amplitude and contour without delay or bruit  JVP is not elevated  EXT: 1+ edema, no calf tenderness. Pulses are present bilaterally. DATA:    Lab Results   Component Value Date    ALT 12 02/24/2020    AST 19 02/24/2020    ALKPHOS 59 02/24/2020    BILITOT <0.2 02/24/2020     Lab Results   Component Value Date    CREATININE 0.8 02/24/2020    BUN 20 02/24/2020     02/24/2020    K 3.8 02/24/2020     02/24/2020    CO2 28 02/24/2020     Lab Results   Component Value Date    TSH 2.03 09/10/2012     Lab Results   Component Value Date    WBC 6.0 02/24/2020    HGB 12.2 02/24/2020    HCT 36.8 02/24/2020    MCV 91.1 02/24/2020     02/24/2020     No components found for: CHLPL  Lab Results   Component Value Date    TRIG 61 06/28/2019    TRIG 85 03/01/2018    TRIG 65 02/16/2017     Lab Results   Component Value Date    HDL 66 (H) 06/28/2019    HDL 63 (H) 03/01/2018    HDL 73 (H) 02/16/2017     Lab Results   Component Value Date    LDLCALC 40 06/28/2019    LDLCALC 50 03/01/2018    LDLCALC 51 02/16/2017     Lab Results   Component Value Date    LABVLDL 12 06/28/2019    LABVLDL 17 03/01/2018    LABVLDL 13 02/16/2017     Imaging:  EKG 2/24/20   Atrial fibrillation 64 bpm  -  Nonspecific T-abnormality. CT head 1/11/19  No evidence of acute intracranial abnormality. EKG 11/13/18  Atrial fibrillationAbnormal ECGWhen compared with ECG of 07-JAN-2016 08:46,No significant change was foundConfirmed by [facility] TALON DAVIS, Paulo Soria ([id number]) on 11/13/2018 11:05:05 AM    CXR 11/13/18  Possible left upper lobe pulmonary nodule. Routine chest CT is recommended for further evaluation. No acute disease. 8/25/16 EKG  A Fib - HR 94    CATH 4/24/12  1. Remote, non-dominant right coronary artery occlusion with   collateralization. 2. No significant disease along the left coronary artery. 3. Normal left ventricular systolic function.  EF 55%  4. No evidence for aortic dissection. Cath 1/2012. [facility] RCA small nondominant 100%. Nonobstructive LAD/LCx.     ECHO from 4/12 showed Normal LV systolic function. EF 55%. Mild aortic valve sclerosis. Biatrial enlargement. Moderate TR with mildly elevated pulmonary pressure. Mild MR. 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 CAD (coronary artery disease) : stable    2. 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 EKG 11/13/18 Atrial fibrillationAbnormal ECGWhen compared with ECG of 07-JAN-2016 08:46,No significant change was foundConfirmed by [facility] TALON DAVIS, Petra Long ([id number]) on 11/13/2018 11:05:05 AM   3. HLD - well controlled last lipids 6/28/19 see above results     4. Angina pectoris. stable       Plan:   1. Medications reviewed. Refills as warranted   2. Will check ECHO to reassess heart function and structure. Call central scheduling [phone] to schedule testing. 3. Follow up with me in 8 months at [facility] office. 4. Labs CMP, CBC. Dig level, fasting lipids in Feb. 2021     This note was scribed in the presence of Jose A Presley MD by Car Jenkins RN      QUALITY MEASURES  1. Tobacco Cessation Counseling: NA  2. Retake of BP if >140/90:   NA  3. Documentation to PCP/referring for new patient:  Sent to PCP at close of office visit  4. CAD patient on anti-platelet: anticoagulated  5. CAD patient on STATIN therapy:  Yes  6. Patient with CHF and aFib on anticoagulation:  Yes       I, Dr. John Roth, personally performed the services described in this documentation, as scribed by the above signed scribe in my presence. It is both accurate and complete to my knowledge. I agree with the details independently gathered by the clinical support staff, while the remaining scribed note accurately describes my personal service to the patient.       Jose A Presley MD

## 2020-07-13 NOTE — PATIENT INSTRUCTIONS
1. Medications reviewed. Refills as warranted   2. Will check ECHO to reassess heart function and structure. Call central scheduling [phone] to schedule testing. 3. Follow up with me in 8 months at [facility]) office. 4. Labs CMP, CBC.  Dig level, fasting lipids in Feb. 2021

## 2020-07-13 NOTE — LETTER
[facility]     Outpatient Follow Up Note    Subjective:  Herbert Sheehan is here today for cardiology follow up of CAD, permanent AF, HLD       [place]:   Today she reports recently doing work outside, [address] In the garden and weeding flowers. She developed CP took 2 nitro which relived the pain. She has had intermittent BLE edema she has been taking a couple times a week which has helped with edema. Otherwise she reports doing ok denies , shortness of breath, edema, dizziness, palpitations and syncope. PMH:   stable CAD, chronic afib rate controlled anticoagulated on treatment with statins for hyperlipidemia.     12 point ROS negative in all areas as listed below except as in [address], EENT, Cardiovascular, pulmonary, GI, , Musculoskeletal, skin, neurological, hematological, endocrine, Psychiatric    Past Medical History:   Diagnosis Date    Atherosclerosis of native coronary artery of native heart without angina pectoris     cath 1/12 , 4/12 (occluded RCa with patent collateral flow    Chronic atrial fibrillation 1/12    Closed left hip fracture ([facility] [address] 1/16    THR    DDD (degenerative disc disease), cervical     Emphysema of lung ([address] 2007    mild    GERD without esophagitis 2001    gerd with stricture    Hyperlipidemia     Osteopenia 8/11    recheck DEXA 2014    Primary osteoarthritis involving multiple joints     Routine health maintenance     TAC 9/99, DEXA 03    Urinary incontinence     urge     Social History:    Social History     Tobacco Use   Smoking Status Former Smoker    Types: Cigarettes   Smokeless Tobacco Never Used   Tobacco Comment    quit in 1990   Family history non contributory  On sister living 2 yrs older apparently doing well in [place]  Current Medications:  Current Outpatient Medications   Medication Sig Dispense Refill    Magnesium 400 MG TABS Take 1 tablet by mouth Daily 30 tablet 0  cimetidine (TAGAMET) 300 MG tablet Take 1 tablet by mouth 2 times daily 60 tablet 5    oxybutynin (DITROPAN) 5 MG tablet TAKE 1 TABLET THREE TIMES DAILY AS NEEDED 270 tablet 1    donepezil (ARICEPT) 5 MG tablet TAKE 1 TABLET BY MOUTH NIGHTLY 90 tablet 1    Cholecalciferol (D3 VITAMIN PO) Take by mouth      pravastatin (PRAVACHOL) 20 MG tablet TAKE 1 TABLET ONCE DAILY 90 tablet 3    famotidine (PEPCID) 20 MG tablet TAKE 1 TABLET TWICE A  tablet 0    nitroGLYCERIN (NITROSTAT) 0.4 MG SL tablet USE 1 TABLET UNDER TONGUE AS NEEDED FOR CHEST PAIN MAY REPEAT EVERY 5M IN. UP TO 3. THEN GO TO ER 25 tablet 0    omeprazole (PRILOSEC) 20 MG delayed release capsule TAKE 1 CAPSULE BY MOUTH EVERY DAY 30 capsule 5    metoprolol tartrate (LOPRESSOR) 25 MG tablet TAKE 1 TABLET TWICE A  tablet 3    rivaroxaban (XARELTO) 20 MG TABS tablet Take 1 tablet by mouth daily Indications: 5-2-17 SAMPLES--LOT # [id number] EXP 8-19 # 35 90 tablet 3    digoxin (LANOXIN) 125 MCG tablet Take 0.5 tablets by mouth daily 45 tablet 3    furosemide (LASIX) 20 MG tablet Take 1 tablet by mouth daily 90 tablet 3     No current facility-administered medications for this visit. REVIEW OF SYSTEMS:    12 point ROS negative in all areas as listed below except as in [place]  Constitutional, EENT, Cardiovascular, pulmonary, GI, , Musculoskeletal, skin, neurological, hematological, endocrine, Psychiatric      Objective:   PHYSICAL EXAM:        VITALS:    There were no vitals taken for this visit. CONSTITUTIONAL: Cooperative, no apparent distress, and appears well nourished / developed  NEUROLOGIC:  Awake and oriented to person, place and time. PSYCH: Calm affect. SKIN: Warm and dry. HEENT: Sclera non-icteric, normocephalic, neck supple, no elevation of JVP, normal carotid pulses with no bruits and thyroid normal size.   LUNGS:  No increased work of breathing  , no wheezing CARDIOVASCULAR: Irregularly irregular rhythm with no murmurs, gallops, rubs, or abnormal heart sounds, normal PMI. The apical impulses not displaced  JVP less than 8 cm H2O  The carotid upstroke is normal in amplitude and contour without delay or bruit  JVP is not elevated  EXT: 1+ edema, no calf tenderness. Pulses are present bilaterally. DATA:    Lab Results   Component Value Date    ALT 12 02/24/2020    AST 19 02/24/2020    ALKPHOS 59 02/24/2020    BILITOT <0.2 02/24/2020     Lab Results   Component Value Date    CREATININE 0.8 02/24/2020    BUN 20 02/24/2020     02/24/2020    K 3.8 02/24/2020     02/24/2020    CO2 28 02/24/2020     Lab Results   Component Value Date    TSH 2.03 09/10/2012     Lab Results   Component Value Date    WBC 6.0 02/24/2020    HGB 12.2 02/24/2020    HCT 36.8 02/24/2020    MCV 91.1 02/24/2020     02/24/2020     No components found for: CHLPL  Lab Results   Component Value Date    TRIG 61 06/28/2019    TRIG 85 03/01/2018    TRIG 65 02/16/2017     Lab Results   Component Value Date    HDL 66 (H) 06/28/2019    HDL 63 (H) 03/01/2018    HDL 73 (H) 02/16/2017     Lab Results   Component Value Date    LDLCALC 40 06/28/2019    LDLCALC 50 03/01/2018    LDLCALC 51 02/16/2017     Lab Results   Component Value Date    LABVLDL 12 06/28/2019    LABVLDL 17 03/01/2018    LABVLDL 13 02/16/2017     Imaging:  EKG 2/24/20   Atrial fibrillation 64 bpm  -  Nonspecific T-abnormality. CT head 1/11/19  No evidence of acute intracranial abnormality. EKG 11/13/18  Atrial fibrillationAbnormal ECGWhen compared with ECG of 07-JAN-2016 08:46,No significant change was foundConfirmed by [facility] TALON DAVIS, Eduin Santiago ([id number]) on 11/13/2018 11:05:05 AM    CXR 11/13/18  Possible left upper lobe pulmonary nodule. Routine chest CT is recommended for further evaluation. No acute disease. 8/25/16 EKG  A Fib - HR 94    CATH 4/24/12  1.  Remote, non-dominant right coronary artery occlusion with collateralization. 2. No significant disease along the left coronary artery. 3. Normal left ventricular systolic function. EF 55%  4. No evidence for aortic dissection. Cath 1/2012. [place]. RCA small nondominant 100%. Nonobstructive LAD/LCx.     ECHO from 4/12 showed Normal LV systolic function. EF 55%. Mild aortic valve sclerosis. Biatrial enlargement. Moderate TR with mildly elevated pulmonary pressure. Mild MR. 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 CAD (coronary artery disease) : stable    2. 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 EKG 11/13/18 Atrial fibrillationAbnormal ECGWhen compared with ECG of 07-JAN-2016 08:46,No significant change was foundConfirmed by [facility] TALON DAVIS, Brayan Babcock ([id number]) on 11/13/2018 11:05:05 AM   3. HLD - well controlled last lipids 6/28/19 see above results     4. Angina pectoris. stable       Plan:   1. Medications reviewed. Refills as warranted   2. Will check ECHO to reassess heart function and structure. Call central scheduling [phone] to schedule testing. 3. Follow up with me in 8 months at [facility] office. 4. Labs CMP, CBC. Dig level, fasting lipids in Feb. 2021     This note was scribed in the presence of Caridad Wheatley MD by Leland Cranker, RN      QUALITY MEASURES  1. Tobacco Cessation Counseling: NA  2. Retake of BP if >140/90:   NA  3. Documentation to PCP/referring for new patient:  Sent to PCP at close of office visit  4. CAD patient on anti-platelet: anticoagulated  5. CAD patient on STATIN therapy:  Yes  6. Patient with CHF and aFib on anticoagulation:  Yes       I, Dr. Amadou Crane, personally performed the services described in this documentation, as scribed by the above signed scribe in my presence. It is both accurate and complete to my knowledge.  I agree with the details independently gathered by the clinical support staff, while the remaining scribed note accurately describes my personal service to the patient.       Linda Walls MD

## 2020-07-24 ENCOUNTER — OFFICE VISIT (OUTPATIENT)
Dept: FAMILY MEDICINE CLINIC | Age: 85
End: 2020-07-24
Payer: MEDICARE

## 2020-07-24 VITALS
BODY MASS INDEX: 28.06 KG/M2 | TEMPERATURE: 98 F | DIASTOLIC BLOOD PRESSURE: 61 MMHG | WEIGHT: 148.5 LBS | SYSTOLIC BLOOD PRESSURE: 120 MMHG | OXYGEN SATURATION: 97 % | HEART RATE: 59 BPM

## 2020-07-24 PROCEDURE — 99214 OFFICE O/P EST MOD 30 MIN: CPT | Performed by: NURSE PRACTITIONER

## 2020-07-24 RX ORDER — CALCIUM CARBONATE 300MG(750)
1 TABLET,CHEWABLE ORAL DAILY
Qty: 30 TABLET | Refills: 0 | Status: SHIPPED | OUTPATIENT
Start: 2020-07-24 | End: 2020-08-14

## 2020-07-24 ASSESSMENT — ENCOUNTER SYMPTOMS
DIARRHEA: 0
WHEEZING: 0
ABDOMINAL DISTENTION: 0
RHINORRHEA: 0
VOMITING: 0
ABDOMINAL PAIN: 0
NAUSEA: 0
COUGH: 0
CHEST TIGHTNESS: 0
SHORTNESS OF BREATH: 0
SORE THROAT: 0

## 2020-07-24 NOTE — PROGRESS NOTES
 CHOLECYSTECTOMY      COLONOSCOPY  6/12    Dr Reggy Frankel    ENDOSCOPY, COLON, DIAGNOSTIC      HYSTERECTOMY      JOINT REPLACEMENT  01/2016    left hip    JOINT REPLACEMENT Left 09/23/2016    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     Family History   Problem Relation Age of Onset    Arthritis Mother     Cancer Mother     Heart Disease Mother     High Blood Pressure Mother     Arthritis Father     Early Death Father     Heart Disease Father     Substance Abuse Father     Arthritis Sister     High Blood Pressure Sister     Arthritis Paternal Grandmother      Social History     Socioeconomic History    Marital status:      Spouse name: Not on file    Number of children: Not on file    Years of education: Not on file    Highest education level: Not on file   Occupational History    Not on file   Social Needs    Financial resource strain: Not hard at all    Food insecurity     Worry: Never true     Inability: Never true   [employer] needs     Medical: No     Non-medical: No   Tobacco Use    Smoking status: Former Smoker     Types: Cigarettes    Smokeless tobacco: Never Used    Tobacco comment: quit in 1990   Substance and Sexual Activity    Alcohol use: No     Alcohol/week: 0.0 standard drinks    Drug use: No    Sexual activity: Not Currently   Lifestyle    Physical activity     Days per week: 0 days     Minutes per session: 0 min    Stress: Not at all   Relationships    Social connections     Talks on phone: Once a week     Gets together: Once a week     Attends Gnosticism service: More than 4 times per year     Active member of club or organization: Yes     Attends meetings of clubs or organizations: More than 4 times per year     Relationship status:      Intimate partner violence     Fear of current or ex partner: Not on file     Emotionally abused: Not on file     Physically abused: Not on file     Forced sexual activity: Not on file   Other Topics Concern    Not on file   Social History Narrative    ** Merged History Encounter **          Current Outpatient Medications   Medication Sig Dispense Refill    Magnesium 400 MG TABS Take 1 tablet by mouth Daily 30 tablet 0    Cholecalciferol (D3 VITAMIN PO) Take by mouth      metoprolol tartrate (LOPRESSOR) 25 MG tablet TAKE 1 TABLET TWICE A  tablet 3    rivaroxaban (XARELTO) 20 MG TABS tablet Take 1 tablet by mouth daily Indications: 5-2-17 SAMPLES--LOT # [id number] EXP 8-19 # 35 90 tablet 3    digoxin (LANOXIN) 125 MCG tablet Take 0.5 tablets by mouth daily 45 tablet 3    furosemide (LASIX) 20 MG tablet Take 1 tablet by mouth daily 90 tablet 3    pravastatin (PRAVACHOL) 20 MG tablet TAKE 1 TABLET ONCE DAILY 90 tablet 3    cimetidine (TAGAMET) 300 MG tablet Take 1 tablet by mouth 2 times daily 60 tablet 5    oxybutynin (DITROPAN) 5 MG tablet TAKE 1 TABLET THREE TIMES DAILY AS NEEDED 270 tablet 1    donepezil (ARICEPT) 5 MG tablet TAKE 1 TABLET BY MOUTH NIGHTLY 90 tablet 1    famotidine (PEPCID) 20 MG tablet TAKE 1 TABLET TWICE A  tablet 0    nitroGLYCERIN (NITROSTAT) 0.4 MG SL tablet USE 1 TABLET UNDER TONGUE AS NEEDED FOR CHEST PAIN MAY REPEAT EVERY 5M IN. UP TO 3. THEN GO TO ER 25 tablet 0    omeprazole (PRILOSEC) 20 MG delayed release capsule TAKE 1 CAPSULE BY MOUTH EVERY DAY 30 capsule 5     No current facility-administered medications for this visit. Allergies   Allergen Reactions    Lipitor      pain    Morphine      confusion    Penicillins Hives    Sulfa Antibiotics Nausea And Vomiting       REVIEW OF SYSTEMS  Review of Systems   Constitutional: Negative for activity change, appetite change, chills and fever. HENT: Negative for congestion, rhinorrhea and sore throat. Eyes: Negative for visual disturbance. Respiratory: Negative for cough, chest tightness, shortness of breath and wheezing. Cardiovascular: Negative for chest pain and leg swelling.    Gastrointestinal: Negative for abdominal distention, abdominal pain, diarrhea, nausea and vomiting. Genitourinary: Negative for dysuria, frequency, hematuria and urgency. Musculoskeletal: Negative for gait problem and myalgias. Skin: Negative for rash. Neurological: Negative for dizziness, weakness, light-headedness, numbness and headaches. Psychiatric/Behavioral: Negative for self-injury and sleep disturbance. The patient is not nervous/anxious. PHYSICAL EXAM  /61   Pulse 59   Temp 98 °F (36.7 °C) (Oral)   Wt 148 lb 8 oz (67.4 kg)   SpO2 97%   BMI 28.06 kg/m²   Physical Exam  Vitals signs reviewed. Constitutional:       General: She is not in acute distress. Appearance: Normal appearance. She is well-developed. She is not diaphoretic. HENT:      Head: Normocephalic and atraumatic. Right Ear: Tympanic membrane normal.      Left Ear: Tympanic membrane normal.      Nose: Nose normal.      Mouth/Throat:      Mouth: Mucous membranes are moist.      Pharynx: Oropharynx is clear. No oropharyngeal exudate or posterior oropharyngeal erythema. Eyes:      General:         Right eye: No discharge. Left eye: No discharge. Pupils: Pupils are equal, round, and reactive to light. Neck:      Musculoskeletal: Normal range of motion and neck supple. Vascular: No JVD. Cardiovascular:      Rate and Rhythm: Normal rate. Rhythm irregular. Pulses: Normal pulses. Pulmonary:      Effort: Pulmonary effort is normal. No respiratory distress. Breath sounds: No stridor. No wheezing, rhonchi or rales. Chest:      Chest wall: No tenderness. Abdominal:      General: Bowel sounds are normal. There is no distension. Palpations: Abdomen is soft. Tenderness: There is no abdominal tenderness. There is no guarding. Musculoskeletal: Normal range of motion. General: No swelling or deformity. Skin:     General: Skin is warm and dry.       Capillary Refill: Capillary refill takes less than 2 seconds. Coloration: Skin is not pale. Findings: No bruising, lesion or rash. Neurological:      Mental Status: She is alert and oriented to person, place, and time. Psychiatric:         Mood and Affect: Mood normal.         Behavior: Behavior normal.          ASSESSMENT/PLAN:   1. GERD without esophagitis  Controlled. Patient compliant with Pepcid and omeprazole. No breakthrough symptoms. Patient avoids foods that trigger her symptoms. No recent dysphasia, cough, nausea, or indigestion. Continue current therapy and management follow-up in 6 months, sooner for new or worsening symptoms. 2. 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 No changes per family. Patient takes Aricept daily. No adverse side effects or missed doses. His family reports that she is doing well denies any significant changes. Patient is still able to care for herself and live alone with family nearby. 3. Panlobular emphysema ([address]  Controlled. Patient was a former smoker. Patient has chronic cough with yellow productive sputum. Patient denies any new or worsening shortness of breath. No orthopnea or dyspnea upon exertion. Continue with current therapy and follow-up in 6 months, sooner for new or worsening symptoms. 4. Atherosclerosis of native coronary artery of native heart without angina pectoris  Stable. Managed by cardiology. Patient seen Dr. Johanny Mathis on 7/13/2020. Patient has labs and upcoming echocardiogram ordered. Patient's family members concerned to have her go to the hospital for testing due to risk associated with COVID exposure. I did recommend patient and family number to discuss this with Dr. Johanny Mathis for further recommendations. Patient otherwise compliant with metoprolol, digoxin, pravastatin, Xarelto daily. Continue current therapy and management follow-up in 6 months, sooner for new or worsening symptoms.     5. Pure hypercholesterolemia  Stable. Patient compliant with pravastatin. No adverse side effects. Continue current therapy and management. Follow-up in 6 months, sooner for any new or worsening symptoms. 6. Chronic atrial fibrillation  Stable. Managed by cardiology. Patient compliant with Xarelto. Continue with current therapy and management follow-up in 6 months, sooner for new or worsening symptoms. 7. Urinary incontinence, unspecified type  Stable. Patient compliant with Ditropan. No new or worsening symptoms. No adverse side effects. Patient denies any worsening urinary urgency, frequency, incontinence. Continue current therapy and management follow-up in 6 months, sooner for new or worsening symptoms. 8. DDD (degenerative disc disease), cervical  Stable. No new or worsening symptoms. Patient is still very active and works out in the yard and mows her own lawn. Patient denies any new or worsening symptoms. Continue with current therapy and management and follow-up in 6 months, sooner for new or worsening symptoms. 9. Muscle cramps  Stable. Patient reports that she does get cramps in her hands and legs from time to time. Patient encouraged to drink more water. Patient reports that recently her water was shut off so she has been drinking more coffee and pop. I did recommend patient drinking more water, family reports that she has bottles of water at home. Continue current therapy and management follow-up in 6 months, sooner for new or worsening symptoms.  - Magnesium 400 MG TABS; Take 1 tablet by mouth Daily  Dispense: 30 tablet; Refill: 0         The note was completed using Dragon voice recognition transcription. Every effort was made to ensure accuracy; however, inadvertent  transcription errors may be present despite my best efforts to edit errors.     Ivan Cancino, TYLER - CNP

## 2020-08-10 ENCOUNTER — NURSE TRIAGE (OUTPATIENT)
Dept: OTHER | Facility: CLINIC | Age: 85
End: 2020-08-10

## 2020-08-10 NOTE — TELEPHONE ENCOUNTER
Received call from Chi Nino in [facility]. Patient is calling with a rash- a fine, red rash- itchy. Started on her belly and is now under her bra, on her shoulders, and her upper groin. The rash started last night. She did mow the yard yesterday. She does have a cat- yesterday the cat jumped on her lap and did knead with his claws. She has 5 places where claws did dig in. Protocol recommendation shared to be seen today. Patient wants to talk to the PCP on the phone. Care advice shared. Call soft transferred to Salima Bearden  in [facility] to schedule appointment/ have office call the patient. Please do not reply to the triage nurse through this encounter. Any subsequent communication should be directly with the patient.     Reason for Disposition   SEVERE itching    Protocols used: RASH OR REDNESS - [facility]

## 2020-08-10 NOTE — TELEPHONE ENCOUNTER
Cannot give advice over the phone if patient wants to be evaluated she will need to make an appointment.

## 2020-08-14 RX ORDER — OMEPRAZOLE 20 MG/1
CAPSULE, DELAYED RELEASE ORAL
Qty: 90 CAPSULE | Refills: 1 | Status: SHIPPED | OUTPATIENT
Start: 2020-08-14 | End: 2021-04-09

## 2020-08-20 ENCOUNTER — TELEPHONE (OUTPATIENT)
Dept: ADMINISTRATIVE | Age: 85
End: 2020-08-20

## 2020-08-20 NOTE — TELEPHONE ENCOUNTER
Hilary Monteiro from [employer] is calling to verify if this patient is still seeing Bryant Belcher. Please call back at [phone].

## 2020-09-09 ENCOUNTER — TELEPHONE (OUTPATIENT)
Dept: FAMILY MEDICINE CLINIC | Age: 85
End: 2020-09-09

## 2020-09-09 NOTE — TELEPHONE ENCOUNTER
----- Message from [email or web address] sent at 9/8/2020  5:18 PM EDT -----  Subject: Appointment Request    Reason for Call: Routine Existing Condition Follow Up    QUESTIONS  Type of Appointment? Established Patient  Reason for appointment request? Available appointments did not meet   patient need  Additional Information for Provider? patient yoav called and and said she   believes the patients hearing aid has broken off in her ear and wanted to   see if she could come in tomorrow but doc. does have anything until the   17th yoav said she will call back in am when office is open to see if   anymore can get her in.  ---------------------------------------------------------------------------  --------------  [address]  What is the best way for the office to contact you? OK to leave message on   voicemail  Preferred Call Back Phone Number? [phone]  ---------------------------------------------------------------------------  --------------  SCRIPT ANSWERS  Relationship to Patient? Other  Representative Name? yoav  Additional information verified (besides Name and Date of Birth)? Address  Appointment reason? Well Care/Follow Ups  Select a Well Care/Follow Ups appointment reason? Adult Existing Condition   Follow Up [Diabetes   CHF   COPD   Hypertension/Blood Pressure Check]  Are you having any new concerns about your existing condition? No  Have you been diagnosed with   tested for   or told that you are suspected of having COVID-19 (Coronavirus)? No  Have you had a fever or taken medication to treat a fever within the past   3 days? No  Have you had a cough   shortness of breath or flu-like symptoms within the past 3 days? No  Do you currently have flu-like symptoms including fever or chills   cough   shortness of breath   or difficulty breathing   or new loss of taste or smell? No  (Service Expert  click yes below to proceed with [employer] Usual   Scheduling)?  Yes

## 2020-09-09 NOTE — TELEPHONE ENCOUNTER
----- Message from Hu Che sent at 9/8/2020  5:18 PM EDT -----  Subject: Appointment Request    Reason for Call: Routine Existing Condition Follow Up    QUESTIONS  Type of Appointment? Established Patient  Reason for appointment request? Available appointments did not meet   patient need  Additional Information for Provider? patient yoav called and and said she   believes the patients hearing aid has broken off in her ear and wanted to   see if she could come in tomorrow but doc. does have anything until the   17th yoav said she will call back in am when office is open to see if   anymore can get her in.  ---------------------------------------------------------------------------  --------------  [address]  What is the best way for the office to contact you? OK to leave message on   voicemail  Preferred Call Back Phone Number? [phone]  ---------------------------------------------------------------------------  --------------  SCRIPT ANSWERS  Relationship to Patient? Other  Representative Name? yoav  Additional information verified (besides Name and Date of Birth)? Address  Appointment reason? Well Care/Follow Ups  Select a Well Care/Follow Ups appointment reason? Adult Existing Condition   Follow Up [Diabetes   CHF   COPD   Hypertension/Blood Pressure Check]  Are you having any new concerns about your existing condition? No  Have you been diagnosed with   tested for   or told that you are suspected of having COVID-19 (Coronavirus)? No  Have you had a fever or taken medication to treat a fever within the past   3 days? No  Have you had a cough   shortness of breath or flu-like symptoms within the past 3 days? No  Do you currently have flu-like symptoms including fever or chills   cough   shortness of breath   or difficulty breathing   or new loss of taste or smell? No  (Service Expert  click yes below to proceed with [employer]ual   Scheduling)?  Yes

## 2020-09-09 NOTE — TELEPHONE ENCOUNTER
----- Message from Ree Lopez sent at 9/8/2020  5:18 PM EDT -----  Subject: Appointment Request    Reason for Call: Routine Existing Condition Follow Up    QUESTIONS  Type of Appointment? Established Patient  Reason for appointment request? Available appointments did not meet   patient need  Additional Information for Provider? patient yoav called and and said she   believes the patients hearing aid has broken off in her ear and wanted to   see if she could come in tomorrow but doc. does have anything until the   17th yoav said she will call back in am when office is open to see if   anymore can get her in.  ---------------------------------------------------------------------------  --------------  [address]  What is the best way for the office to contact you? OK to leave message on   voicemail  Preferred Call Back Phone Number? [phone]  ---------------------------------------------------------------------------  --------------  SCRIPT ANSWERS  Relationship to Patient? Other  Representative Name? yoav  Additional information verified (besides Name and Date of Birth)? Address  Appointment reason? Well Care/Follow Ups  Select a Well Care/Follow Ups appointment reason? Adult Existing Condition   Follow Up [Diabetes   CHF   COPD   Hypertension/Blood Pressure Check]  Are you having any new concerns about your existing condition? No  Have you been diagnosed with   tested for   or told that you are suspected of having COVID-19 (Coronavirus)? No  Have you had a fever or taken medication to treat a fever within the past   3 days? No  Have you had a cough   shortness of breath or flu-like symptoms within the past 3 days? No  Do you currently have flu-like symptoms including fever or chills   cough   shortness of breath   or difficulty breathing   or new loss of taste or smell? No  (Service Expert  click yes below to proceed with [email or web address] As Usual   Scheduling)?  Yes

## 2020-09-30 RX ORDER — DONEPEZIL HYDROCHLORIDE 5 MG/1
5 TABLET, FILM COATED ORAL NIGHTLY
Qty: 90 TABLET | Refills: 1 | Status: SHIPPED | OUTPATIENT
Start: 2020-09-30 | End: 2021-04-09

## 2020-10-09 ENCOUNTER — TELEPHONE (OUTPATIENT)
Dept: FAMILY MEDICINE CLINIC | Age: 85
End: 2020-10-09

## 2020-10-09 NOTE — TELEPHONE ENCOUNTER
Please call in a prescription for clindamycin 600 mg p.o. x1 dose 1 hour prior to procedure. Notify patient of prescription.

## 2020-10-09 NOTE — TELEPHONE ENCOUNTER
Notified Enid Pardo, dentist went ahead and gave prescription for same medication so does not need this called in

## 2020-10-09 NOTE — TELEPHONE ENCOUNTER
Patient is getting dentures and dental office advised her that she should have clindamycin the day she has teeth extracted because of her having hip and knee replacement.

## 2020-10-27 ENCOUNTER — TELEPHONE (OUTPATIENT)
Dept: CARDIOLOGY CLINIC | Age: 85
End: 2020-10-27

## 2020-10-27 NOTE — TELEPHONE ENCOUNTER
I would restart tonight with blood thinner but check with dentist as well to make sure they are OK with restarting. Thank you.

## 2020-10-27 NOTE — TELEPHONE ENCOUNTER
Pt calling had a dental procedure yesterday. (teeth extraction) she has been office xarelto since Friday 10.23.20. Per pt she was told to call cardio to ask when to restart the Xarelto. Per pt she is not having any bleeding from the gum area. Last ov 7.13.20-rkg      Assessment:       1. CAD (coronary artery disease) : stable    2. 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 EKG 11/13/18 Atrial fibrillationAbnormal ECGWhen compared with ECG of 07-JAN-2016 08:46,No significant change was foundConfirmed by [facility] TALON DAVIS, Jolanta Sinclair ([id number]) on 11/13/2018 11:05:05 AM   3. HLD - well controlled last lipids 6/28/19 see above results      4. Angina pectoris. stable        Plan:   1. Medications reviewed. Refills as warranted   2. Will check ECHO to reassess heart function and structure. Call central scheduling [phone] to schedule testing. 3. Follow up with me in 8 months at [facility] ([facility] office. 4. Labs CMP, CBC.  Dig level, fasting lipids in Feb. 2021

## 2020-12-30 NOTE — PROGRESS NOTES
Assessment and plan  1. Chronic atrial fibrillation (HCC)  Overall stable    2. Panlobular emphysema (HCC)  Stable    3. GERD without esophagitis  Stable    4. Atherosclerosis of native coronary artery of native heart without angina pectoris  Stable    Healthy Family prevention recommendations given. Continue all current prescription medications as listed below. I recommended the Shingrix vaccine series. Flu vaccine given today    RTC 4 months or sooner prn. Subjective  Patient returns for reevaluation of her medical problems including coronary artery disease, A. fib, reflux, and emphysema. Overall she feels she is getting along well physically. She's had one episode of A. fib since she was last here. No exertional angina. No heartburn or dysphagia. No worsening shortness of breath. She has rather sad today as a very close friend, someone she spent a lot of time with, unexpectedly  last month.     Medications: see list below  Allergies   Allergen Reactions    Lipitor      pain    Morphine      confusion    Penicillins Hives    Sulfa Antibiotics Nausea And Vomiting     Past history:  She is seeing a dermatologist over some facial lesions    Review of systems:  Constitutional:  fatigue - no                                                  abnormal weight loss - no  Respiratory: wheezing - no                       dyspnea on exertion - no            unusual cough - no  Gastrointestinal:  frequent heartburn- no                             dysphagia - no  Urologic:  Hematuria - no                   Dysuria - no    Objective  /77   Pulse 60   Wt 148 lb 12.8 oz (67.5 kg)   SpO2 91%   BMI 27.66 kg/m²   Patient is alert, oriented, and in no acute distress  Psych:  mood and affect are unremarkable               speech and thought processes appear intact               Behavior and appearance unremarkable  Neck:  No masses, trachea midline, phonation normal   Thyroid - unremarkable I had contacted mother yesterday to see how patient was doing after the ER and she stated he was doing much better and eating well. I would suggest that he be seen then, either here or at an UC unfortunately.

## 2021-01-19 ENCOUNTER — TELEPHONE (OUTPATIENT)
Dept: CARDIOLOGY CLINIC | Age: 86
End: 2021-01-19

## 2021-01-19 NOTE — TELEPHONE ENCOUNTER
Pt is calling wanting you opinion if she should get the Covid-19 Vac?     She is concerned because she is on blood thinners and has A-fib and she is allergic to PCN

## 2021-02-28 ENCOUNTER — HOSPITAL ENCOUNTER (EMERGENCY)
Age: 86
Discharge: HOME | End: 2021-02-28
Attending: EMERGENCY MEDICINE
Payer: MEDICARE

## 2021-02-28 ENCOUNTER — APPOINTMENT (OUTPATIENT)
Dept: GENERAL RADIOLOGY | Age: 86
End: 2021-02-28
Payer: MEDICARE

## 2021-02-28 VITALS
HEIGHT: 62 IN | HEART RATE: 74 BPM | TEMPERATURE: 98.6 F | RESPIRATION RATE: 16 BRPM | OXYGEN SATURATION: 99 % | WEIGHT: 149 LBS | BODY MASS INDEX: 27.42 KG/M2 | SYSTOLIC BLOOD PRESSURE: 120 MMHG | DIASTOLIC BLOOD PRESSURE: 74 MMHG

## 2021-02-28 DIAGNOSIS — I48.20 CHRONIC ATRIAL FIBRILLATION (HCC): Primary | ICD-10-CM

## 2021-02-28 LAB
ANION GAP SERPL CALCULATED.3IONS-SCNC: 7 MMOL/L (ref 3–16)
BASOPHILS ABSOLUTE: 0 K/UL (ref 0–0.2)
BASOPHILS RELATIVE PERCENT: 1 %
BUN BLDV-MCNC: 19 MG/DL (ref 7–20)
CALCIUM SERPL-MCNC: 9.6 MG/DL (ref 8.3–10.6)
CHLORIDE BLD-SCNC: 105 MMOL/L (ref 99–110)
CO2: 29 MMOL/L (ref 21–32)
CREAT SERPL-MCNC: 0.8 MG/DL (ref 0.6–1.2)
EKG ATRIAL RATE: 84 BPM
EKG DIAGNOSIS: NORMAL
EKG Q-T INTERVAL: 380 MS
EKG QRS DURATION: 82 MS
EKG QTC CALCULATION (BAZETT): 438 MS
EKG R AXIS: 24 DEGREES
EKG T AXIS: -24 DEGREES
EKG VENTRICULAR RATE: 80 BPM
EOSINOPHILS ABSOLUTE: 0.2 K/UL (ref 0–0.6)
EOSINOPHILS RELATIVE PERCENT: 3.6 %
GFR AFRICAN AMERICAN: >60
GFR NON-AFRICAN AMERICAN: >60
GLUCOSE BLD-MCNC: 104 MG/DL (ref 70–99)
HCT VFR BLD CALC: 35.4 % (ref 36–48)
HEMOGLOBIN: 11.8 G/DL (ref 12–16)
LYMPHOCYTES ABSOLUTE: 0.8 K/UL (ref 1–5.1)
LYMPHOCYTES RELATIVE PERCENT: 16.8 %
MCH RBC QN AUTO: 30 PG (ref 26–34)
MCHC RBC AUTO-ENTMCNC: 33.2 G/DL (ref 31–36)
MCV RBC AUTO: 90.3 FL (ref 80–100)
MONOCYTES ABSOLUTE: 0.4 K/UL (ref 0–1.3)
MONOCYTES RELATIVE PERCENT: 8.5 %
NEUTROPHILS ABSOLUTE: 3.5 K/UL (ref 1.7–7.7)
NEUTROPHILS RELATIVE PERCENT: 70.1 %
PDW BLD-RTO: 14.3 % (ref 12.4–15.4)
PLATELET # BLD: 194 K/UL (ref 135–450)
PMV BLD AUTO: 6.8 FL (ref 5–10.5)
POTASSIUM REFLEX MAGNESIUM: 4.3 MMOL/L (ref 3.5–5.1)
RBC # BLD: 3.93 M/UL (ref 4–5.2)
SODIUM BLD-SCNC: 141 MMOL/L (ref 136–145)
TROPONIN: <0.01 NG/ML
WBC # BLD: 5 K/UL (ref 4–11)

## 2021-02-28 PROCEDURE — 80048 BASIC METABOLIC PNL TOTAL CA: CPT

## 2021-02-28 PROCEDURE — 84484 ASSAY OF TROPONIN QUANT: CPT

## 2021-02-28 PROCEDURE — 6370000000 HC RX 637 (ALT 250 FOR IP): Performed by: EMERGENCY MEDICINE

## 2021-02-28 PROCEDURE — 71045 X-RAY EXAM CHEST 1 VIEW: CPT

## 2021-02-28 PROCEDURE — 93010 ELECTROCARDIOGRAM REPORT: CPT | Performed by: INTERNAL MEDICINE

## 2021-02-28 PROCEDURE — 93005 ELECTROCARDIOGRAM TRACING: CPT | Performed by: EMERGENCY MEDICINE

## 2021-02-28 PROCEDURE — 99285 EMERGENCY DEPT VISIT HI MDM: CPT

## 2021-02-28 PROCEDURE — 85025 COMPLETE CBC W/AUTO DIFF WBC: CPT

## 2021-02-28 RX ORDER — DIGOXIN 125 MCG
62.5 TABLET ORAL ONCE
Status: COMPLETED | OUTPATIENT
Start: 2021-02-28 | End: 2021-02-28

## 2021-02-28 RX ADMIN — DIGOXIN 62.5 MCG: 125 TABLET ORAL at 12:13

## 2021-02-28 RX ADMIN — METOPROLOL TARTRATE 25 MG: 25 TABLET, FILM COATED ORAL at 12:13

## 2021-02-28 NOTE — ED PROVIDER NOTES
CHIEF COMPLAINT  Irregular Heart Beat (patient states she was feeling palpatations starting at 4am and lasted until the squad arrived. Patient states she was late on her medications and thinks that may be the issue.)      HISTORY OF PRESENT ILLNESS  Larias Bishop is a 80 y.o. female with a history of atrial fibrillation, COPD, GERD, dyslipidemia, dementia who presents to the ED complaining of palpitations. Patient reports onset of palpitations around 4 AM.  She did not check her pulse rate but felt that her heart was beating rapidly. She reports feeling short of breath at the time however is currently asymptomatic. Patient believes symptoms may be due to a missed dose of her cardiac medications yesterday because she forgot to take them. She also has not taken her morning doses. Currently denies chest pain, nausea, diaphoresis, cough, fever, chills. No other complaints, modifying factors or associated symptoms. I have reviewed the following from the nursing documentation.     Past Medical History:   Diagnosis Date    Atherosclerosis of native coronary artery of native heart without angina pectoris     cath 1/12 , 4/12 (occluded RCa with patent collateral flow    Chronic atrial fibrillation ([address] 1/12    Closed left hip fracture (HCC) 1/16    THR    DDD (degenerative disc disease), cervical     Emphysema of lung ([address] 2007    mild    GERD without esophagitis 2001    gerd with stricture    Hyperlipidemia     Osteopenia 8/11    recheck DEXA 2014    Primary osteoarthritis involving multiple joints     Routine health maintenance     TAC 9/99, DEXA 03    Urinary incontinence     urge     Past Surgical History:   Procedure Laterality Date    APPENDECTOMY      CATARACT REMOVAL      CHOLECYSTECTOMY      COLONOSCOPY  6/12    Dr Jenna Colby, COLON, DIAGNOSTIC      HYSTERECTOMY      JOINT REPLACEMENT  01/2016    left hip    JOINT REPLACEMENT Left 09/23/2016    LEFT TOTAL KNEE REPLACEMENT WITH  donepezil (ARICEPT) 5 MG tablet TAKE 1 TABLET BY MOUTH NIGHTLY 90 tablet 1    magnesium oxide (MAG-OX) 400 (241.3 Mg) MG TABS tablet TAKE 1 TABLET BY MOUTH DAILY 30 tablet 5    omeprazole (PRILOSEC) 20 MG delayed release capsule TAKE 1 CAPSULE BY MOUTH EVERY DAY 90 capsule 1    metoprolol tartrate (LOPRESSOR) 25 MG tablet TAKE 1 TABLET TWICE A  tablet 3    rivaroxaban (XARELTO) 20 MG TABS tablet Take 1 tablet by mouth daily Indications: 5-2-17 SAMPLES--LOT # [id number] EXP 8-19 # 35 90 tablet 3    digoxin (LANOXIN) 125 MCG tablet Take 0.5 tablets by mouth daily 45 tablet 3    furosemide (LASIX) 20 MG tablet Take 1 tablet by mouth daily 90 tablet 3    pravastatin (PRAVACHOL) 20 MG tablet TAKE 1 TABLET ONCE DAILY 90 tablet 3    cimetidine (TAGAMET) 300 MG tablet Take 1 tablet by mouth 2 times daily 60 tablet 5    oxybutynin (DITROPAN) 5 MG tablet TAKE 1 TABLET THREE TIMES DAILY AS NEEDED 270 tablet 1    Cholecalciferol (D3 VITAMIN PO) Take by mouth      famotidine (PEPCID) 20 MG tablet TAKE 1 TABLET TWICE A  tablet 0    nitroGLYCERIN (NITROSTAT) 0.4 MG SL tablet USE 1 TABLET UNDER TONGUE AS NEEDED FOR CHEST PAIN MAY REPEAT EVERY 5M IN. UP TO 3. THEN GO TO ER 25 tablet 0     Allergies   Allergen Reactions    Lipitor      pain    Morphine      confusion    Penicillins Hives    Sulfa Antibiotics Nausea And Vomiting       REVIEW OF SYSTEMS  10 systems reviewed, pertinent positives per HPI otherwise noted to be negative. PHYSICAL EXAM  /73   Pulse 99   Temp 98.6 °F (37 °C)   Resp 16   Ht 5' 2\" (1.575 m)   Wt 149 lb (67.6 kg)   SpO2 99%   BMI 27.25 kg/m²   GENERAL APPEARANCE: Awake and alert. Cooperative. No acute distress. HEAD: Normocephalic. Atraumatic. EYES: PERRL. EOM's grossly intact. ENT: Mucous membranes are moist.   NECK: Supple, trachea midline. HEART: Regular rate, irregularly irregular rhythm.  Normal S1S2, no rubs, gallops, or murmurs noted  LUNGS: Respirations unlabored. CTAB. Good air exchange. No wheezes, rales, or rhonchi. Speaking comfortably in full sentences. ABDOMEN: Soft. Non-distended. Non-tender. No guarding or rebound. Normal bowel sounds. EXTREMITIES: No peripheral edema. MAEE. No acute deformities. SKIN: Warm and dry. No acute rashes. NEUROLOGICAL: Alert and oriented X 3. CN II-XII intact. No gross facial drooping. Strength 5/5, sensation intact. Normal coordination. No pronator drift. No truncal instability. PSYCHIATRIC: Normal mood and affect. LABS  I have reviewed all labs for this visit.    Results for orders placed or performed during the hospital encounter of 02/28/21   CBC Auto Differential   Result Value Ref Range    WBC 5.0 4.0 - 11.0 K/uL    RBC 3.93 (L) 4.00 - 5.20 M/uL    Hemoglobin 11.8 (L) 12.0 - 16.0 g/dL    Hematocrit 35.4 (L) 36.0 - 48.0 %    MCV 90.3 80.0 - 100.0 fL    MCH 30.0 26.0 - 34.0 pg    MCHC 33.2 31.0 - 36.0 g/dL    RDW 14.3 12.4 - 15.4 %    Platelets 428 404 - 212 K/uL    MPV 6.8 5.0 - 10.5 fL    Neutrophils % 70.1 %    Lymphocytes % 16.8 %    Monocytes % 8.5 %    Eosinophils % 3.6 %    Basophils % 1.0 %    Neutrophils Absolute 3.5 1.7 - 7.7 K/uL    Lymphocytes Absolute 0.8 (L) 1.0 - 5.1 K/uL    Monocytes Absolute 0.4 0.0 - 1.3 K/uL    Eosinophils Absolute 0.2 0.0 - 0.6 K/uL    Basophils Absolute 0.0 0.0 - 0.2 K/uL   Basic Metabolic Panel w/ Reflex to MG   Result Value Ref Range    Sodium 141 136 - 145 mmol/L    Potassium reflex Magnesium 4.3 3.5 - 5.1 mmol/L    Chloride 105 99 - 110 mmol/L    CO2 29 21 - 32 mmol/L    Anion Gap 7 3 - 16    Glucose 104 (H) 70 - 99 mg/dL    BUN 19 7 - 20 mg/dL    CREATININE 0.8 0.6 - 1.2 mg/dL    GFR Non-African American >60 >60    GFR African American >60 >60    Calcium 9.6 8.3 - 10.6 mg/dL   Troponin   Result Value Ref Range    Troponin <0.01 <0.01 ng/mL   EKG 12 Lead   Result Value Ref Range    Ventricular Rate 80 BPM    Atrial Rate 84 BPM    QRS Duration 82 ms    Q-T Interval 380 ms QTc Calculation (Bazett) 438 ms    R Axis 24 degrees    T Axis -24 degrees    Diagnosis       Atrial fibrillationCannot rule out Anterior infarct , age undeterminedAbnormal ECGNo previous ECGs available       EKG  Atrial fibrillation, rate 80, normal axis, QTC within acceptable limits, no acute ST changes or new T wave inversions when compared with prior dated 2/24/2020      RADIOLOGY  X-RAYS:  I have reviewed radiologic plain film image(s). ALL OTHER NON-PLAIN FILM IMAGES SUCH AS CT, ULTRASOUND AND MRI HAVE BEEN READ BY THE RADIOLOGIST. XR CHEST PORTABLE   Final Result   No acute abnormality visualized. Rechecks: Physical assessment performed. Patient is comfortable and nontoxic on reevaluation and would like to go home. ED COURSE/MDM  Patient seen and evaluated. Old records reviewed. Labs and imaging reviewed and results discussed with patient. Suspect episode of atrial fibrillation with RVR prior to arrival causing her sensation of palpitations and dyspnea both of which have now resolved. Work-up is unremarkable and patient is well-appearing. Encouraged not to skip any further doses of her cardiac medications. New Prescriptions    No medications on file       CLINICAL IMPRESSION  1. Chronic atrial fibrillation (HCC)        Blood pressure 123/73, pulse 99, temperature 98.6 °F (37 °C), resp. rate 16, height 5' 2\" (1.575 m), weight 149 lb (67.6 kg), SpO2 99 %, not currently breastfeeding. DISPOSITION  Radha Trejo was discharged to home in stable condition.         April Liriano MD  02/28/21 2102

## 2021-03-01 ENCOUNTER — CARE COORDINATION (OUTPATIENT)
Dept: CASE MANAGEMENT | Age: 86
End: 2021-03-01

## 2021-03-01 RX ORDER — CIMETIDINE 300 MG/1
TABLET, FILM COATED ORAL
Qty: 60 TABLET | Refills: 5 | Status: SHIPPED | OUTPATIENT
Start: 2021-03-01 | End: 2021-08-23

## 2021-03-01 NOTE — CARE COORDINATION
Attempted to contact patient-First number [phone] is no longer in service and provides new number [phone]  The phone then rings and rings with no answer; unable to leave voice message

## 2021-03-02 ENCOUNTER — CARE COORDINATION (OUTPATIENT)
Dept: CASE MANAGEMENT | Age: 86
End: 2021-03-02

## 2021-03-02 DIAGNOSIS — R25.2 MUSCLE CRAMPS: ICD-10-CM

## 2021-03-02 NOTE — TELEPHONE ENCOUNTER
We can discuss all these concerns at her upcoming appointment. I do think it is okay to wait unless patient would have any change or worsening symptoms. .  Patient also needs to follow-up with cardiology as well.

## 2021-03-02 NOTE — CARE COORDINATION
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 Patient also reports the caps on the medication bottles are not easy to remove. Discussed utilizing medication boxes; patient reports she would get more confused when she used these in the past  Patient rushing to end call; she agrees to f/u call and she agrees for this ACM to contact her niece regarding ED visit  Patient reports she needs to get ready because she is receiving the 2nd dose of the COVID-19 vaccine today  Patient declined review of d/c instructions. This ACM contacted patients niece Kevin Arellano). Ms Laquita Maria reports she lives near by and helps as needed; she does not have guardianship or health care poa. Ms. Laquita Maria reports she has noticed patient has had \"slight decline\" in memory and the patient has reported to her she occasionally wakes up thinking there are snakes in her bed. Patient has f/u with PCP scheduled next week and Ms. Laquita Maria agrees to discuss concerns with PCP; this ACM will also notify provider. Discussed various types of medication boxes including ones that lock and alert to remind patient to take medication. Ms. Laquita Maria reports the patient sleeps often and gets her days/nights mixed; she does not think the medication box would be helpful  Ms. Laquita Maria reports she could visit or call daily to remind patient to take her medications. Discussed COA/AAA-7 and provided contact number for ADL assessment/Medical Alert  Discussed Alzheimer's organization/web site/resources, provided contact number and encouraged Ms. Laquita Maria to contact organization for additional support and resources. Ms. Laquita Maria confirms she is taking patient for her 2nd dose of COVID-19 vaccine today.     Spoke with CC SW re: Resources/referrals    ===============  Ambulatory Care Manager contacted the patient by telephone to perform post discharge assessment. Verified name with patient as identifiers. Patient contacted regarding recent discharge and COVID-19 risk. Discussed COVID-19 related testing which was not done at this time  Patient has the following risk factors: Age. Reviewed the following with patient:  From [facility]: Are you at higher risk for severe illness?  Wash your hands often.  Avoid close contact (6 feet, which is about two arm lengths) with people who are sick.  Put distance between yourself and other people if COVID-19 is spreading in your community.  Clean and disinfect frequently touched surfaces.  Avoid all cruise travel and non-essential air travel.  Call your healthcare professional if you have concerns about COVID-19 and your underlying condition or if you are sick. For more information on steps you can take to protect yourself, see CDC's How to [id number] [place] [address] for follow-up call in 7-14 days based on risk factors.

## 2021-03-08 NOTE — PROGRESS NOTES
CHIEF COMPLAINT  Chief Complaint   Patient presents with    Check-Up        HPI   Ayde Lozano is a 80 y.o. female who presents to the office 6-month follow-up. Patient presents today with family. Patient continues to live alone and does have periods of forgetfulness and confusion. Patient's family reports otherwise she does pretty well. No unusual fatigue or unexplained weight loss. No episodes of dizziness, lightheadedness, shortness of breath. Patient has had recent episodes of atrial fibrillation. Patient reports going to the ER for evaluation and treatment. Patient reports since then she has had 3 episodes. Patient denies any cough, congestion, abdominal pain or discomfort. No nausea, vomiting, or diarrhea. No changes in bowel or bladder habits. Patient reports a fall approximately 1 month ago. Patient reports that she was caring towels and her hands were full and she misstepped going down the 2 steps. Patient reports falling backwards onto her butt but denies any head injury or loss of consciousness. She reports occasionally forgetting to take some of her medications at the time they are due but reports taking them on a daily basis. No other complaints, modifying factors or associated symptoms. Nursing notes reviewed.    Past Medical History:   Diagnosis Date    Atherosclerosis of native coronary artery of native heart without angina pectoris     cath 1/12 , 4/12 (occluded RCa with patent collateral flow    Chronic atrial fibrillation ([address] 1/12    Closed left hip fracture ([address] 1/16    THR    DDD (degenerative disc disease), cervical     Emphysema of lung ([address] 2007    mild    GERD without esophagitis 2001    gerd with stricture    Hyperlipidemia     Osteopenia 8/11    recheck DEXA 2014    Primary osteoarthritis involving multiple joints     Routine health maintenance     TAC 9/99, DEXA 03    Urinary incontinence     urge     Past Surgical History:   Procedure Laterality Date    APPENDECTOMY      CATARACT REMOVAL      CHOLECYSTECTOMY      COLONOSCOPY  6/12    Dr Eva Harrison ENDOSCOPY, COLON, DIAGNOSTIC      HYSTERECTOMY      JOINT REPLACEMENT  01/2016    left hip    JOINT REPLACEMENT Left 09/23/2016    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     Family History   Problem Relation Age of Onset    Arthritis Mother     Cancer Mother     Heart Disease Mother     High Blood Pressure Mother     Arthritis Father     Early Death Father     Heart Disease Father     Substance Abuse Father     Arthritis Sister     High Blood Pressure Sister     Arthritis Paternal Grandmother      Social History     Socioeconomic History    Marital status:      Spouse name: Not on file    Number of children: Not on file    Years of education: Not on file    Highest education level: Not on file   Occupational History    Not on file   Social Needs    Financial resource strain: Not hard at all    Food insecurity     Worry: Never true     Inability: Never true   [employer] needs     Medical: No     Non-medical: No   Tobacco Use    Smoking status: Former Smoker     Types: Cigarettes    Smokeless tobacco: Never Used    Tobacco comment: quit in 1990   Substance and Sexual Activity    Alcohol use: No     Alcohol/week: 0.0 standard drinks    Drug use: No    Sexual activity: Not Currently   Lifestyle    Physical activity     Days per week: 0 days     Minutes per session: 0 min    Stress: Not at all   Relationships    Social connections     Talks on phone: Once a week     Gets together: Once a week     Attends Church service: More than 4 times per year     Active member of club or organization: Yes     Attends meetings of clubs or organizations: More than 4 times per year     Relationship status:      Intimate partner violence     Fear of current or ex partner: Not on file     Emotionally abused: Not on file     Physically abused: Not on file     Forced sexual activity: Not on file   Other Topics Concern    Not on file   Social History Narrative    ** Merged History Encounter **          Current Outpatient Medications   Medication Sig Dispense Refill    magnesium oxide (MAG-OX) 400 (241.3 Mg) MG TABS tablet TAKE 1 TABLET BY MOUTH DAILY 30 tablet 5    cimetidine (TAGAMET) 300 MG tablet TAKE 1 TABLET BY MOUTH TWICE DAILY 60 tablet 5    donepezil (ARICEPT) 5 MG tablet TAKE 1 TABLET BY MOUTH NIGHTLY 90 tablet 1    omeprazole (PRILOSEC) 20 MG delayed release capsule TAKE 1 CAPSULE BY MOUTH EVERY DAY 90 capsule 1    metoprolol tartrate (LOPRESSOR) 25 MG tablet TAKE 1 TABLET TWICE A  tablet 3    rivaroxaban (XARELTO) 20 MG TABS tablet Take 1 tablet by mouth daily Indications: 5-2-17 SAMPLES--LOT # [id number] EXP 8-19 # 35 90 tablet 3    digoxin (LANOXIN) 125 MCG tablet Take 0.5 tablets by mouth daily 45 tablet 3    furosemide (LASIX) 20 MG tablet Take 1 tablet by mouth daily 90 tablet 3    pravastatin (PRAVACHOL) 20 MG tablet TAKE 1 TABLET ONCE DAILY 90 tablet 3    oxybutynin (DITROPAN) 5 MG tablet TAKE 1 TABLET THREE TIMES DAILY AS NEEDED 270 tablet 1    Cholecalciferol (D3 VITAMIN PO) Take by mouth      famotidine (PEPCID) 20 MG tablet TAKE 1 TABLET TWICE A  tablet 0    nitroGLYCERIN (NITROSTAT) 0.4 MG SL tablet USE 1 TABLET UNDER TONGUE AS NEEDED FOR CHEST PAIN MAY REPEAT EVERY 5M IN. UP TO 3. THEN GO TO ER 25 tablet 0     No current facility-administered medications for this visit. Allergies   Allergen Reactions    Lipitor      pain    Morphine      confusion    Penicillins Hives    Sulfa Antibiotics Nausea And Vomiting       REVIEW OF SYSTEMS  Review of Systems   Constitutional: Negative for activity change, appetite change, chills and fever. HENT: Negative for congestion, rhinorrhea and sore throat. Eyes: Negative for visual disturbance. Respiratory: Negative for cough, chest tightness, shortness of breath and wheezing. Cardiovascular: Positive for palpitations. Negative for chest pain and leg swelling. Gastrointestinal: Negative for abdominal pain, diarrhea, nausea and vomiting. Genitourinary: Negative for dysuria, frequency, hematuria and urgency. Musculoskeletal: Negative for gait problem and myalgias. Skin: Negative for rash. Neurological: Negative for dizziness, weakness, light-headedness, numbness and headaches. Psychiatric/Behavioral: Negative for self-injury and sleep disturbance. The patient is not nervous/anxious. PHYSICAL EXAM  /75   Pulse 70   Temp 98.3 °F (36.8 °C) (Oral)   Wt 149 lb 8 oz (67.8 kg)   SpO2 95%   BMI 27.34 kg/m²   Physical Exam  Vitals signs reviewed. Constitutional:       General: She is not in acute distress. Appearance: Normal appearance. She is well-developed. She is not diaphoretic. HENT:      Head: Normocephalic and atraumatic. Right Ear: Tympanic membrane normal.      Left Ear: Tympanic membrane normal.      Nose: Nose normal.      Mouth/Throat:      Mouth: Mucous membranes are moist.      Pharynx: Oropharynx is clear. No oropharyngeal exudate or posterior oropharyngeal erythema. Eyes:      General:         Right eye: No discharge. Left eye: No discharge. Pupils: Pupils are equal, round, and reactive to light. Neck:      Musculoskeletal: Normal range of motion and neck supple. Vascular: No JVD. Cardiovascular:      Rate and Rhythm: Normal rate. Rhythm irregular. Pulses: Normal pulses. Pulmonary:      Effort: Pulmonary effort is normal. No respiratory distress. Breath sounds: No stridor. No wheezing, rhonchi or rales. Chest:      Chest wall: No tenderness. Abdominal:      General: Bowel sounds are normal. There is no distension. Palpations: Abdomen is soft. Tenderness: There is no abdominal tenderness. There is no guarding. Musculoskeletal: Normal range of motion.          General: No swelling or deformity. Skin:     General: Skin is warm and dry. Capillary Refill: Capillary refill takes less than 2 seconds. Coloration: Skin is not pale. Findings: No bruising, lesion or rash. Neurological:      Mental Status: She is alert and oriented to person, place, and time. Psychiatric:         Attention and Perception: Attention normal.         Mood and Affect: Mood normal.         Speech: Speech normal.         Behavior: Behavior normal.         Thought Content: Thought content normal.        ASSESSMENT/PLAN:   1. GERD without esophagitis  Stable. Patient compliant with Pepcid and omeprazole. No breakthrough symptoms. Patient avoids foods that trigger her symptoms. No recent dysphasia, cough, nausea, or indigestion. Continue current therapy and management follow-up in 6 months, sooner for new or worsening symptoms. 2. 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 Patient compliant with Aricept 5 mg daily. No adverse side effects or missed doses. Patient reports forgetfulness at times. Family reports that she does get confused and have difficulty remembering and that she does get frustrated at those times. We did discuss increasing medication or keeping at current dose. Risks versus benefits were discussed in detail with patient and family member. We discussed with her staying alone and adverse side effects of increasing the medication to keep with current dose at this time however if symptoms were to worsen or progress she would need to be reevaluated and discuss dose adjustments at that time. Patient and family member both verbalized and acknowledged follow-up in 6 months, sooner for new or worsening symptoms. 3. Atherosclerosis of native coronary artery of native heart without angina pectoris  Stable. Managed by cardiology. Patient is scheduled to see cardiologist within the next month. I did recommend her calling and scheduling appointment. Patient has recommendations to have echocardiogram since last visit. Patient reports that she was concerned getting it due to Covid. I did recommend her having testing done and following up with cardiology. Patient verbalized and acknowledges. Continue with current treatment management follow-up in 6 months, sooner for new or worsening symptoms. 4. Chronic atrial fibrillation  Stable. Managed by cardiology. Patient compliant with digoxin and Xarelto. Patient scheduled to see cardiologist this month and have labs prior to visit. We discussed having labs performed at today's visit and will forward them to cardiologist.  Patient verbalized and acknowledges. Patient reports going to the hospital approximately 1 month ago with plaints of palpitations. Patient was evaluated for atrial fibrillation. Patient reports that symptoms resolved by the time she got to the hospital.  Patient reports that she has had 3 episodes since then but have only lasted a few minutes. Patient reports that she does feel anxious at times. Patient encouraged to take medications as prescribed and not to miss any doses. We did discuss the importance of medication compliance see. I did recommend pill organization however patient reports doing that in the past which confused her more. Patient to follow-up with cardiology within the next month, sooner for new or worsening symptoms.  - DIGOXIN LEVEL    5. Pure hypercholesterolemia  Stable. Patient compliant with pravastatin. No adverse side effects or missed doses. Continue current treatment management labs ordered and pending. Follow-up with results. Follow-up in 6 months, sooner for new or worsening symptoms.  - Lipid, Fasting    6. Primary osteoarthritis involving multiple joints  Stable. Patient reports occasional cramps and back pain if she works too much. Patient encouraged to drink plenty of water and taking Tylenol as needed. Patient denies any unilateral weakness. Continue with current treatment management follow-up in 6 months, sooner for new or worsening symptoms. 7. At high risk for falls  Stable. Patient reports fall approximately 1 month ago. Patient reports that her hands were full going down 2 steps and misjudged her stepping. Patient reports that she fell backwards and because her arms were full she was unable to catch herself. Patient reports landing on her backside denies any head injury or loss of consciousness. Safety measures were discussed in detail with patient. Recommendations for patient not to carry items with both hands and to make multiple trips if necessary. Patient denies any residual effects from fall and denies any difficulty with ambulation at this time. Safety measures encouraged and discussed with patient. Follow-up in 1 year, sooner for new or worsening symptoms. On the basis of positive falls risk screening, assessment and plan is as follows: home safety tips provided, patient declines any further evaluation/treatment for increased falls risk. The note was completed using Dragon voice recognition transcription. Every effort was made to ensure accuracy; however, inadvertent  transcription errors may be present despite my best efforts to edit errors.     Oralia Hodges, APRN - CNP

## 2021-03-09 ENCOUNTER — OFFICE VISIT (OUTPATIENT)
Dept: FAMILY MEDICINE CLINIC | Age: 86
End: 2021-03-09
Payer: MEDICARE

## 2021-03-09 VITALS
DIASTOLIC BLOOD PRESSURE: 75 MMHG | HEART RATE: 70 BPM | SYSTOLIC BLOOD PRESSURE: 122 MMHG | BODY MASS INDEX: 27.34 KG/M2 | OXYGEN SATURATION: 95 % | WEIGHT: 149.5 LBS | TEMPERATURE: 98.3 F

## 2021-03-09 DIAGNOSIS — G30.1 LATE ONSET ALZHEIMER'S DISEASE WITHOUT BEHAVIORAL DISTURBANCE (HCC): ICD-10-CM

## 2021-03-09 DIAGNOSIS — F02.80 LATE ONSET ALZHEIMER'S DISEASE WITHOUT BEHAVIORAL DISTURBANCE (HCC): ICD-10-CM

## 2021-03-09 DIAGNOSIS — I48.20 CHRONIC ATRIAL FIBRILLATION (HCC): ICD-10-CM

## 2021-03-09 DIAGNOSIS — E78.00 PURE HYPERCHOLESTEROLEMIA: ICD-10-CM

## 2021-03-09 DIAGNOSIS — K21.9 GERD WITHOUT ESOPHAGITIS: Primary | ICD-10-CM

## 2021-03-09 DIAGNOSIS — I25.10 ATHEROSCLEROSIS OF NATIVE CORONARY ARTERY OF NATIVE HEART WITHOUT ANGINA PECTORIS: ICD-10-CM

## 2021-03-09 DIAGNOSIS — M15.9 PRIMARY OSTEOARTHRITIS INVOLVING MULTIPLE JOINTS: ICD-10-CM

## 2021-03-09 DIAGNOSIS — Z91.81 AT HIGH RISK FOR FALLS: ICD-10-CM

## 2021-03-09 LAB
CHOLESTEROL, FASTING: 151 MG/DL (ref 0–199)
DIGOXIN LEVEL: 1 NG/ML (ref 0.8–2)
HDLC SERPL-MCNC: 61 MG/DL (ref 40–60)
LDL CHOLESTEROL CALCULATED: 72 MG/DL
TRIGLYCERIDE, FASTING: 88 MG/DL (ref 0–150)
VLDLC SERPL CALC-MCNC: 18 MG/DL

## 2021-03-09 PROCEDURE — 99214 OFFICE O/P EST MOD 30 MIN: CPT | Performed by: NURSE PRACTITIONER

## 2021-03-09 PROCEDURE — 36415 COLL VENOUS BLD VENIPUNCTURE: CPT | Performed by: NURSE PRACTITIONER

## 2021-03-09 ASSESSMENT — ENCOUNTER SYMPTOMS
DIARRHEA: 0
ABDOMINAL PAIN: 0
SHORTNESS OF BREATH: 0
COUGH: 0
CHEST TIGHTNESS: 0
WHEEZING: 0
VOMITING: 0
SORE THROAT: 0
RHINORRHEA: 0
NAUSEA: 0

## 2021-03-09 ASSESSMENT — PATIENT HEALTH QUESTIONNAIRE - PHQ9
SUM OF ALL RESPONSES TO PHQ QUESTIONS 1-9: 2
SUM OF ALL RESPONSES TO PHQ9 QUESTIONS 1 & 2: 2

## 2021-03-10 ENCOUNTER — TELEPHONE (OUTPATIENT)
Dept: FAMILY MEDICINE CLINIC | Age: 86
End: 2021-03-10

## 2021-03-11 ENCOUNTER — CARE COORDINATION (OUTPATIENT)
Dept: CARE COORDINATION | Age: 86
End: 2021-03-11

## 2021-03-11 NOTE — CARE COORDINATION
Attempted to contact patient for CC f/u-1st number has been disconnected  Second number listed-mail box is full and unable to leave voice message    PLAN:  Next f/u call:  Inquire if patient scheduled Echo (central [phone])  Inquire if f/u appointment scheduled with cardiology ([facility]) office)  Noted labs for cardiology completed.   Review Fall precautions and complete CC

## 2021-03-22 ENCOUNTER — APPOINTMENT (OUTPATIENT)
Dept: GENERAL RADIOLOGY | Age: 86
End: 2021-03-22
Payer: MEDICARE

## 2021-03-22 ENCOUNTER — HOSPITAL ENCOUNTER (EMERGENCY)
Age: 86
Discharge: HOME | End: 2021-03-22
Payer: MEDICARE

## 2021-03-22 ENCOUNTER — TELEPHONE (OUTPATIENT)
Dept: FAMILY MEDICINE CLINIC | Age: 86
End: 2021-03-22

## 2021-03-22 VITALS
RESPIRATION RATE: 16 BRPM | BODY MASS INDEX: 28.22 KG/M2 | WEIGHT: 149.5 LBS | OXYGEN SATURATION: 100 % | DIASTOLIC BLOOD PRESSURE: 74 MMHG | TEMPERATURE: 98.8 F | SYSTOLIC BLOOD PRESSURE: 118 MMHG | HEART RATE: 64 BPM | HEIGHT: 61 IN

## 2021-03-22 DIAGNOSIS — S80.12XA CONTUSION OF LEFT KNEE AND LOWER LEG, INITIAL ENCOUNTER: ICD-10-CM
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
## 2021-03-22 DIAGNOSIS — L03.116 CELLULITIS OF LEFT LOWER EXTREMITY: Primary | ICD-10-CM

## 2021-03-22 DIAGNOSIS — S80.02XA CONTUSION OF LEFT KNEE AND LOWER LEG, INITIAL ENCOUNTER: ICD-10-CM

## 2021-03-22 LAB
A/G RATIO: 1.7 (ref 1.1–2.2)
ALBUMIN SERPL-MCNC: 4.1 G/DL (ref 3.4–5)
ALP BLD-CCNC: 65 U/L (ref 40–129)
ALT SERPL-CCNC: 10 U/L (ref 10–40)
ANION GAP SERPL CALCULATED.3IONS-SCNC: 11 MMOL/L (ref 3–16)
AST SERPL-CCNC: 18 U/L (ref 15–37)
BASOPHILS ABSOLUTE: 0 K/UL (ref 0–0.2)
BASOPHILS RELATIVE PERCENT: 0.4 %
BILIRUB SERPL-MCNC: 0.5 MG/DL (ref 0–1)
BUN BLDV-MCNC: 19 MG/DL (ref 7–20)
CALCIUM SERPL-MCNC: 9.2 MG/DL (ref 8.3–10.6)
CHLORIDE BLD-SCNC: 102 MMOL/L (ref 99–110)
CO2: 25 MMOL/L (ref 21–32)
CREAT SERPL-MCNC: 1 MG/DL (ref 0.6–1.2)
EOSINOPHILS ABSOLUTE: 0.2 K/UL (ref 0–0.6)
EOSINOPHILS RELATIVE PERCENT: 4.2 %
GFR AFRICAN AMERICAN: >60
GFR NON-AFRICAN AMERICAN: 52
GLOBULIN: 2.4 G/DL
GLUCOSE BLD-MCNC: 106 MG/DL (ref 70–99)
HCT VFR BLD CALC: 30.2 % (ref 36–48)
HEMOGLOBIN: 10.1 G/DL (ref 12–16)
LACTIC ACID: 0.9 MMOL/L (ref 0.4–2)
LYMPHOCYTES ABSOLUTE: 0.8 K/UL (ref 1–5.1)
LYMPHOCYTES RELATIVE PERCENT: 13.9 %
MCH RBC QN AUTO: 30.4 PG (ref 26–34)
MCHC RBC AUTO-ENTMCNC: 33.4 G/DL (ref 31–36)
MCV RBC AUTO: 90.9 FL (ref 80–100)
MONOCYTES ABSOLUTE: 0.7 K/UL (ref 0–1.3)
MONOCYTES RELATIVE PERCENT: 11.5 %
NEUTROPHILS ABSOLUTE: 4.1 K/UL (ref 1.7–7.7)
NEUTROPHILS RELATIVE PERCENT: 70 %
PDW BLD-RTO: 14.7 % (ref 12.4–15.4)
PLATELET # BLD: 217 K/UL (ref 135–450)
PMV BLD AUTO: 6.9 FL (ref 5–10.5)
POTASSIUM REFLEX MAGNESIUM: 4.1 MMOL/L (ref 3.5–5.1)
RBC # BLD: 3.33 M/UL (ref 4–5.2)
SODIUM BLD-SCNC: 138 MMOL/L (ref 136–145)
TOTAL PROTEIN: 6.5 G/DL (ref 6.4–8.2)
WBC # BLD: 5.9 K/UL (ref 4–11)

## 2021-03-22 PROCEDURE — 73562 X-RAY EXAM OF KNEE 3: CPT

## 2021-03-22 PROCEDURE — 73502 X-RAY EXAM HIP UNI 2-3 VIEWS: CPT

## 2021-03-22 PROCEDURE — 83605 ASSAY OF LACTIC ACID: CPT

## 2021-03-22 PROCEDURE — 85025 COMPLETE CBC W/AUTO DIFF WBC: CPT

## 2021-03-22 PROCEDURE — 6370000000 HC RX 637 (ALT 250 FOR IP): Performed by: PHYSICIAN ASSISTANT

## 2021-03-22 PROCEDURE — 73590 X-RAY EXAM OF LOWER LEG: CPT

## 2021-03-22 PROCEDURE — 80053 COMPREHEN METABOLIC PANEL: CPT

## 2021-03-22 PROCEDURE — 99284 EMERGENCY DEPT VISIT MOD MDM: CPT

## 2021-03-22 RX ORDER — CLINDAMYCIN HYDROCHLORIDE 150 MG/1
450 CAPSULE ORAL 3 TIMES DAILY
Qty: 63 CAPSULE | Refills: 0 | Status: SHIPPED | OUTPATIENT
Start: 2021-03-22 | End: 2021-03-29

## 2021-03-22 RX ORDER — CLINDAMYCIN HYDROCHLORIDE 150 MG/1
450 CAPSULE ORAL ONCE
Status: COMPLETED | OUTPATIENT
Start: 2021-03-22 | End: 2021-03-22

## 2021-03-22 RX ADMIN — CLINDAMYCIN HYDROCHLORIDE 450 MG: 150 CAPSULE ORAL at 18:50

## 2021-03-22 ASSESSMENT — ENCOUNTER SYMPTOMS
NAUSEA: 0
ABDOMINAL PAIN: 0
COUGH: 0
SORE THROAT: 0
EYE PAIN: 0
SHORTNESS OF BREATH: 0
VOMITING: 0
BACK PAIN: 0

## 2021-03-22 ASSESSMENT — PAIN DESCRIPTION - DESCRIPTORS: DESCRIPTORS: CONSTANT

## 2021-03-22 ASSESSMENT — PAIN SCALES - GENERAL: PAINLEVEL_OUTOF10: 5

## 2021-03-22 ASSESSMENT — PAIN DESCRIPTION - LOCATION: LOCATION: LEG

## 2021-03-22 ASSESSMENT — PAIN DESCRIPTION - PAIN TYPE: TYPE: ACUTE PAIN

## 2021-03-22 ASSESSMENT — PAIN DESCRIPTION - FREQUENCY: FREQUENCY: CONTINUOUS

## 2021-03-22 NOTE — TELEPHONE ENCOUNTER
Family called regarding patient. She fell over a week ago and hurt the upper part of her leg. Today the bottom of her leg is very swollen, painful red and a little warm to touch.   Advised for patient to go to ER, family agreed and will take her to [facility]

## 2021-03-23 ENCOUNTER — TELEPHONE (OUTPATIENT)
Dept: FAMILY MEDICINE CLINIC | Age: 86
End: 2021-03-23

## 2021-03-23 ENCOUNTER — CARE COORDINATION (OUTPATIENT)
Dept: CARE COORDINATION | Age: 86
End: 2021-03-23

## 2021-03-23 NOTE — ED PROVIDER NOTES
[address] ED  EMERGENCY DEPARTMENT ENCOUNTER        Pt Name: Sean Blandon  MRN: [id number]  Armstrongflan 12/21/1926  Date of evaluation: 3/22/2021  Provider: MYCHAL Molina  PCP: Elana Mcgovern, TYLER - CNP    KANDY. I have evaluated this patient. My supervising physician was available for consultation. CHIEF COMPLAINT       Chief Complaint   Patient presents with   Yanique Jones last week, fell off toilet due to falling asleep, did hit head, denies LOC, anticoagulated per daughter c/o left hip pain adn bruising, Left lower leg redness, swelling, drainage onset today. Daughter states she has a fall x2 wks ago missed a step, afebrile       HISTORY OF PRESENT ILLNESS   (Location, Timing/Onset, Context/Setting, Quality, Duration, Modifying Factors, Severity, Associated Signs and Symptoms)  Note limiting factors. Sean Blandon is a 80 y.o. female presents to the emergency department for left leg bruising, redness. 1 week ago, patient fell off the toilet because she fell asleep and delivered since that she was very tired, denies syncopal episode. Franchot Chessman onto her left hip. Did not hit her head or lose consciousness. She has had bruising of her lateral left leg. Over the last 4 to 5 days has had redness of her left lower leg, fluid drainage over the last 2 days. Called the office of her primary care father, referred to the emergency room. She denies fever, chest pain, shortness of breath, abdominal pain, nausea, vomiting, numbness, weakness. Is on Xarelto for A. Fib, has been taking it as prescribed. Nursing Notes were all reviewed and agreed with or any disagreements were addressed in the HPI. REVIEW OF SYSTEMS    (2-9 systems for level 4, 10 or more for level 5)     Review of Systems   Constitutional: Negative for fever. HENT: Negative for sore throat. Eyes: Negative for pain and visual disturbance. Respiratory: Negative for cough and shortness of breath. Cardiovascular: Negative for chest pain. Gastrointestinal: Negative for abdominal pain, nausea and vomiting. Genitourinary: Negative for dysuria and frequency. Musculoskeletal: Positive for arthralgias. Negative for back pain and neck pain. Skin: Positive for rash and wound. Neurological: Negative for dizziness, weakness, numbness and headaches. Psychiatric/Behavioral: Negative for confusion. Positives and Pertinent negatives as per HPI. Except as noted above in the ROS, all other systems were reviewed and negative.        PAST MEDICAL HISTORY     Past Medical History:   Diagnosis Date    Atherosclerosis of native coronary artery of native heart without angina pectoris     cath 1/12 , 4/12 (occluded RCa with patent collateral flow    Chronic atrial fibrillation ([facility] [facility] 1/12    Closed left hip fracture ([facility] [address] [facility] 1/16    THR    DDD (degenerative disc disease), cervical     Emphysema of lung ([facility] [address] 2007    mild    GERD without esophagitis 2001    gerd with stricture    Hyperlipidemia     Osteopenia 8/11    recheck DEXA 2014    Primary osteoarthritis involving multiple joints     Routine health maintenance     TAC 9/99, DEXA 03    Urinary incontinence     urge         SURGICAL HISTORY     Past Surgical History:   Procedure Laterality Date    APPENDECTOMY      CATARACT REMOVAL      CHOLECYSTECTOMY      COLONOSCOPY  6/12    Dr Munir Arredondo, COLON, DIAGNOSTIC      HYSTERECTOMY      JOINT REPLACEMENT  01/2016    left hip    JOINT REPLACEMENT Left 09/23/2016    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         CURRENTMEDICATIONS       Discharge Medication List as of 3/22/2021  6:34 PM      CONTINUE these medications which have NOT CHANGED    Details   magnesium oxide (MAG-OX) 400 (241.3 Mg) MG TABS tablet TAKE 1 TABLET BY MOUTH DAILY, Disp-30 tablet, R-5Normal      cimetidine (TAGAMET) 300 MG tablet TAKE 1 TABLET BY MOUTH TWICE DAILY, Disp-60 tablet, R-5Normal      donepezil (ARICEPT) 5 MG tablet TAKE 1 TABLET BY MOUTH NIGHTLY, Disp-90 tablet, R-1Normal      omeprazole (PRILOSEC) 20 MG delayed release capsule TAKE 1 CAPSULE BY MOUTH EVERY DAY, Disp-90 capsule,R-1Normal      metoprolol tartrate (LOPRESSOR) 25 MG tablet TAKE 1 TABLET TWICE A DAY, Disp-180 tablet,R-3Normal      rivaroxaban (XARELTO) 20 MG TABS tablet Take 1 tablet by mouth daily Indications: 5-2-17 SAMPLES--LOT # [id number] EXP 8-19 # 35, Disp-90 tablet,R-3Normal      digoxin (LANOXIN) 125 MCG tablet Take 0.5 tablets by mouth daily, Disp-45 tablet,R-3Normal      furosemide (LASIX) 20 MG tablet Take 1 tablet by mouth daily, Disp-90 tablet,R-3Normal      pravastatin (PRAVACHOL) 20 MG tablet TAKE 1 TABLET ONCE DAILY, Disp-90 tablet,R-3Normal      oxybutynin (DITROPAN) 5 MG tablet TAKE 1 TABLET THREE TIMES DAILY AS NEEDED, Disp-270 tablet,R-1Normal      Cholecalciferol (D3 VITAMIN PO) Take by mouthHistorical Med      famotidine (PEPCID) 20 MG tablet TAKE 1 TABLET TWICE A DAY, Disp-180 tablet, R-0Normal      nitroGLYCERIN (NITROSTAT) 0.4 MG SL tablet USE 1 TABLET UNDER TONGUE AS NEEDED FOR CHEST PAIN MAY REPEAT EVERY 5M IN. UP TO 3.  THEN GO TO ER, Disp-25 tablet, R-0, DAWNormal               ALLERGIES     Lipitor, Morphine, Penicillins, and Sulfa antibiotics    FAMILYHISTORY       Family History   Problem Relation Age of Onset    Arthritis Mother     Cancer Mother     Heart Disease Mother     High Blood Pressure Mother     Arthritis Father     Early Death Father     Heart Disease Father     Substance Abuse Father     Arthritis Sister     High Blood Pressure Sister     Arthritis Paternal Grandmother           SOCIAL HISTORY       Social History     Tobacco Use    Smoking status: Former Smoker     Types: Cigarettes    Smokeless tobacco: Never Used    Tobacco comment: quit in 1990   Substance Use Topics    Alcohol use: No     Alcohol/week: 0.0 standard drinks    Drug use: No       SCREENINGS    Kenneth Coma Scale  Eye Opening: Spontaneous  Best Verbal Response: Oriented  Best Motor Response: Obeys commands  Juan Pablo Coma Scale Score: 15        PHYSICAL EXAM    (up to 7 for level 4, 8 or more for level 5)     ED Triage Vitals [03/22/21 1639]   BP Temp Temp Source Pulse Resp SpO2 Height Weight   114/60 98.8 °F (37.1 °C) Oral 69 16 98 % 5' 1\" (1.549 m) 149 lb 8 oz (67.8 kg)       Physical Exam  Vitals signs reviewed. Constitutional:       Appearance: She is not diaphoretic. HENT:      Head: Normocephalic and atraumatic. Nose: No congestion or rhinorrhea. Eyes:      General: No scleral icterus. Extraocular Movements: Extraocular movements intact. Conjunctiva/sclera: Conjunctivae normal.      Pupils: Pupils are equal, round, and reactive to light. Neck:      Musculoskeletal: Normal range of motion and neck supple. No neck rigidity or muscular tenderness. Cardiovascular:      Rate and Rhythm: Normal rate and regular rhythm. Pulses: Normal pulses. Heart sounds: Normal heart sounds. No murmur. No friction rub. No gallop. Pulmonary:      Effort: Pulmonary effort is normal. No respiratory distress. Breath sounds: Normal breath sounds. No stridor. No wheezing, rhonchi or rales. Abdominal:      General: There is no distension. Palpations: Abdomen is soft. Tenderness: There is no abdominal tenderness. There is no right CVA tenderness, left CVA tenderness, guarding or rebound. Musculoskeletal: Normal range of motion. General: No swelling. Comments: Lateral left leg ecchymosis. Distal half of left lower leg with erythema, yellow serous fluid drainage. No crepitus, pain out of proportion. 2+ DP and PT pulses. No pain with passive or active range of motion of hip, knee, ankle, toes bilaterally. No spinal or upper extremity tenderness, injury. Skin:     General: Skin is warm and dry. Capillary Refill: Capillary refill takes less than 2 seconds.       Findings: Bruising and erythema present. Neurological:      General: No focal deficit present. Mental Status: She is alert and oriented to person, place, and time. Sensory: No sensory deficit. Motor: No weakness. Gait: Gait normal.   Psychiatric:         Mood and Affect: Mood normal.         Behavior: Behavior normal.         DIAGNOSTIC RESULTS   LABS:    Labs Reviewed   CBC WITH AUTO DIFFERENTIAL - Abnormal; Notable for the following components:       Result Value    RBC 3.33 (*)     Hemoglobin 10.1 (*)     Hematocrit 30.2 (*)     Lymphocytes Absolute 0.8 (*)     All other components within normal limits    Narrative:     Performed at:  [place] [address],  [facility]   Phone [phone]   COMPREHENSIVE METABOLIC PANEL W/ REFLEX TO MG FOR LOW K - Abnormal; Notable for the following components:    Glucose 106 (*)     GFR Non- 52 (*)     All other components within normal limits    Narrative:     Performed at:  [facility]) - [place] [address],  [facility]   Phone [phone]   LACTIC ACID, PLASMA    Narrative:     Performed at:  [facility]) - [place] [address],  [facility]   Phone [phone]       All other labs were within normal range or not returned as of this dictation. EKG: All EKG's are interpreted by the Emergency Department Physician in the absence of a cardiologist.  Please see their note for interpretation of EKG.       RADIOLOGY:   Non-plain film images such as CT, Ultrasound and MRI are read by the radiologist. Plain radiographic images are visualized and preliminarily interpreted by the ED Provider with the below findings:        Interpretation per the Radiologist below, if available at the time of this note:    XR TIBIA FIBULA LEFT (2 VIEWS)   Final Result   Total left knee replacement in good position with and diffuse osteopenia with   no acute bony abnormality. Moderate cellulitis and/or edema in the subcutaneous soft tissues around the   calf and extending inferiorly. XR KNEE LEFT (3 VIEWS)   Final Result   Total left knee replacement which is unchanged with no acute bony abnormality. Mild cellulitis or edema in the soft tissues around the knee. Small suprapatellar effusion. Small suprapatellar effusion. XR HIP LEFT (2-3 VIEWS)   Final Result   Status post total left hip replacement in good position with no acute bony   abnormality. Mild osteoarthritic changes in the right hip. Diffuse osteopenia      Mild edema or cellulitis in the soft tissues lateral to the left hip. Xr Hip Left (2-3 Views)    Result Date: 3/22/2021  EXAMINATION: TWO XRAY VIEWS OF THE LEFT HIP 3/22/2021 5:26 pm COMPARISON: None. HISTORY: ORDERING SYSTEM PROVIDED HISTORY: Fall TECHNOLOGIST PROVIDED HISTORY: Reason for exam:->Fall Reason for Exam: fall Acuity: Acute Type of Exam: Initial FINDINGS: There is a metallic prosthesis in the left hip with the acetabular and femoral portions of the prosthesis in good position. There is mild stranding and edema in the subcutaneous soft tissues lateral to the left hip. The pelvis is intact. The bones are osteopenic. There is mild joint space narrowing in the right hip. Status post total left hip replacement in good position with no acute bony abnormality. Mild osteoarthritic changes in the right hip. Diffuse osteopenia Mild edema or cellulitis in the soft tissues lateral to the left hip.      Xr Knee Left (3 Views)    Result Date: 3/22/2021  EXAMINATION: THREE XRAY VIEWS OF THE LEFT KNEE 3/22/2021 5:26 pm COMPARISON: 09/23/2016 HISTORY: ORDERING SYSTEM PROVIDED HISTORY: Fall TECHNOLOGIST PROVIDED HISTORY: Reason for exam:->Fall Reason for Exam: fall, bruising behind knee Acuity: Acute Type of Exam: Initial FINDINGS: There is a metallic prosthesis in the femoral condyle and tibial plateau which are held in place methylmethacrylate and are unchanged in position. No fracture or dislocation is seen. There are postop changes along the patella. There is a small suprapatellar effusion. The bones are osteopenic. There is mild stranding or edema in the soft tissues around the knee. Total left knee replacement which is unchanged with no acute bony abnormality. Mild cellulitis or edema in the soft tissues around the knee. Small suprapatellar effusion. Small suprapatellar effusion. Xr Tibia Fibula Left (2 Views)    Result Date: 3/22/2021  EXAMINATION: XRAY VIEWS OF THE LEFT TIBIA AND FIBULA 3/22/2021 5:26 pm COMPARISON: None. HISTORY: ORDERING SYSTEM PROVIDED HISTORY: Fall, left lower leg cellulitis TECHNOLOGIST PROVIDED HISTORY: Reason for exam:->Fall, left lower leg cellulitis Reason for Exam: Fall, left lower leg cellulitis Acuity: Acute Type of Exam: Initial FINDINGS: There is a metallic prosthesis in the left knee which is in good position. The tibia and fibula are intact. The joint spaces are intact. No fracture or dislocation is seen. There is moderate stranding and edema in the subcutaneous soft tissues around the calf region extending inferiorly around the ankle. No foreign bodies or subcutaneous air is seen. Total left knee replacement in good position with and diffuse osteopenia with no acute bony abnormality. Moderate cellulitis and/or edema in the subcutaneous soft tissues around the calf and extending inferiorly.            PROCEDURES   Unless otherwise noted below, none     Procedures    CRITICAL CARE TIME   N/A    CONSULTS:  None      EMERGENCY DEPARTMENT COURSE and DIFFERENTIAL DIAGNOSIS/MDM:   Vitals:    Vitals:    03/22/21 1639 03/22/21 1851   BP: 114/60 118/74   Pulse: 69 64   Resp: 16 16   Temp: 98.8 °F (37.1 °C)    TempSrc: Oral    SpO2: 98% 100%   Weight: 149 lb 8 oz (67.8 kg)    Height: 5' 1\" (1.549 m)        Patient was given the following medications:  Medications   clindamycin (CLEOCIN) capsule 450 mg (450 mg Oral Given 3/22/21 1850)           77-year-old female presents the emergency department following fall 1 week ago with bruising, erythema. Neurovascularly intact. No calf swelling or tenderness, low concern for DVT and patient is already being treated with Xarelto. Exam notable for a left lower leg erythema with serous drainage concerning for cellulitis. There is not crepitus or pain out of proportion concerning for necrotizing soft tissue infection. X-ray of right hip, knee, tib-fib without evidence of fracture or dislocation. Labs reassuring, normal white count and lactate, low concern for sepsis. Appropriate for discharge with antibiotics, outpatient follow-up, return precautions. Given prescription for clindamycin. Instructed follow with primary care provider, ideally to be seen within 3 days for wound check. Erythema was marked with surgical marker. Instructed to return to the emergency room for new or worsening symptoms including but not limited to worsening redness or drainage after 3 days of oral antibiotics, fever, worsening pain, numbness, weakness, chest pain, shortness breath, any other symptoms she is concerned about. Verbal and written discharge instructions and return precautions given. FINAL IMPRESSION      1. Cellulitis of left lower extremity    2. Contusion of left knee and lower leg, initial encounter    3.  Fall, initial encounter          DISPOSITION/PLAN   DISPOSITION Decision To Discharge 03/22/2021 06:15:56 PM      PATIENT REFERREDTO:  Clovis Chin, APRN - CNP  [address]  [phone]    In 3 days  For wound re-check      DISCHARGE MEDICATIONS:  Discharge Medication List as of 3/22/2021  6:34 PM      START taking these medications    Details   clindamycin (CLEOCIN) 150 MG capsule Take 3 capsules by mouth 3 times daily for 7 days, Disp-63 capsule, R-0Normal DISCONTINUED MEDICATIONS:  Discharge Medication List as of 3/22/2021  6:34 PM                 (Please note that portions of this note were completed with a voice recognition program.  Efforts were made to edit the dictations but occasionally words are mis-transcribed.)    Corinne Fruit, PA (electronically signed)         Corinne Fruit, PA  03/22/21 4608

## 2021-03-25 ENCOUNTER — CARE COORDINATION (OUTPATIENT)
Dept: CARE COORDINATION | Age: 86
End: 2021-03-25

## 2021-03-25 NOTE — CARE COORDINATION
Attempted to contact patient for CC f/u and second attempt to contact patient for ED F/U; left  HIPPA compliant message and ACM contact information

## 2021-03-26 ENCOUNTER — OFFICE VISIT (OUTPATIENT)
Dept: FAMILY MEDICINE CLINIC | Age: 86
End: 2021-03-26
Payer: MEDICARE

## 2021-03-26 VITALS
OXYGEN SATURATION: 94 % | TEMPERATURE: 98 F | WEIGHT: 154.4 LBS | BODY MASS INDEX: 29.17 KG/M2 | DIASTOLIC BLOOD PRESSURE: 69 MMHG | SYSTOLIC BLOOD PRESSURE: 117 MMHG | HEART RATE: 57 BPM

## 2021-03-26 DIAGNOSIS — L03.116 CELLULITIS OF LEFT LOWER EXTREMITY: Primary | ICD-10-CM

## 2021-03-26 PROCEDURE — 99214 OFFICE O/P EST MOD 30 MIN: CPT | Performed by: NURSE PRACTITIONER

## 2021-03-26 ASSESSMENT — ENCOUNTER SYMPTOMS
RESPIRATORY NEGATIVE: 1
COLOR CHANGE: 1
GASTROINTESTINAL NEGATIVE: 1

## 2021-03-26 NOTE — PATIENT INSTRUCTIONS
hydrogen peroxide or alcohol, which can slow healing. ? You may cover the wound with a thin layer of petroleum jelly, such as Vaseline, and a nonstick bandage. ? Apply more petroleum jelly and replace the bandage as needed. · Be safe with medicines. Take pain medicines exactly as directed. ? If the doctor gave you a prescription medicine for pain, take it as prescribed. ? If you are not taking a prescription pain medicine, ask your doctor if you can take an over-the-counter medicine. To prevent cellulitis in the future  · Try to prevent cuts, scrapes, or other injuries to your skin. Cellulitis most often occurs where there is a break in the skin. · If you get a scrape, cut, mild burn, or bite, wash the wound with clean water as soon as you can to help avoid infection. Don't use hydrogen peroxide or alcohol, which can slow healing. · If you have swelling in your legs (edema), support stockings and good skin care may help prevent leg sores and cellulitis. · Take care of your feet, especially if you have diabetes or other conditions that increase the risk of infection. Wear shoes and socks. Do not go barefoot. If you have athlete's foot or other skin problems on your feet, talk to your doctor about how to treat them. When should you call for help? Call your doctor now or seek immediate medical care if:    · You have signs that your infection is getting worse, such as:  ? Increased pain, swelling, warmth, or redness. ? Red streaks leading from the area. ? Pus draining from the area. ? A fever.     · You get a rash. Watch closely for changes in your health, and be sure to contact your doctor if:    · You do not get better as expected. Where can you learn more? Go to [email or web address] org and sign in to your [employer] account. Enter O507 in the [employer] box to learn more about \"Cellulitis: Care Instructions. \"     If you do not have an account, please click on the \"Sign Up Now\" link. Current as of: July 2, 2020               Content Version: 12.8  © 2006-2021 [employer]. Care instructions adapted under license by [facility] ([facility]). If you have questions about a medical condition or this instruction, always ask your healthcare professional. [address] any warranty or liability for your use of this information. Patient Education        clindamycin (oral/injection)  Pronunciation:  merritt sunshine MYE sin  Brand:  Cleocin HCl, Cleocin Pediatric, Cleocin Phosphate  What is the most important information I should know about clindamycin? Clindamycin can cause diarrhea, which may be severe or lead to serious, life-threatening intestinal problems. If you have diarrhea that is watery or bloody, stop using clindamycin and call your doctor. What is clindamycin? Clindamycin is an antibiotic that is used to treat serious infections caused by bacteria. Clindamycin may also be used for purposes not listed in this medication guide. What should I discuss with my healthcare provider before using clindamycin? You should not use this medicine if you are allergic to clindamycin or lincomycin. Tell your doctor if you have ever had:  · colitis, Crohn's disease, or other intestinal disorder;  · eczema, or allergic skin reaction;  · asthma or a severe allergic reaction to aspirin;  · liver disease; or  · an allergy to yellow food dye. It is not known whether this medicine will harm an unborn baby. Tell your doctor if you are pregnant or plan to become pregnant. Clindamycin can pass into breast milk and may cause side effects in the nursing baby. If you are breastfeeding while taking this medicine, call your doctor if your baby has diaper rash, redness or white patches in the mouth or throat, stomach discomfort, or diarrhea that is watery or bloody.   Clindamycin injection may contain an ingredient that can cause serious side effects or death in very young or premature babies. Do not give this medicine to a child without medical advice. How should I use clindamycin? Follow all directions on your prescription label and read all medication guides or instruction sheets. Use the medicine exactly as directed. Clindamycin oral is taken by mouth. Clindamycin injection is injected into a muscle, or as an infusion into a vein. A healthcare provider will give your first dose and may teach you how to properly use the medication by yourself. Take the capsule with a full glass of water to keep it from irritating your throat. Measure liquid medicine carefully. Use the dosing syringe provided, or use a medicine dose-measuring device (not a kitchen spoon). You may need frequent medical tests during treatment. If you need surgery, tell your surgeon you currently use clindamycin. Use this medicine for the full prescribed length of time, even if your symptoms quickly improve. Skipping doses can increase your risk of infection that is resistant to medication. Clindamycin will not treat a viral infection such as the flu or a common cold. Store at room temperature away from moisture and heat. Protect the injectable medicine from high heat. Do not store the oral liquid in the refrigerator. Throw away any unused oral liquid after 2 weeks. Use a needle and syringe only once and then place them in a puncture-proof \"sharps\" container. Follow state or local laws about how to dispose of this container. Keep it out of the reach of children and pets. What happens if I miss a dose? Use the medicine as soon as you can, but skip the missed dose if it is almost time for your next dose. Do not use two doses at one time. What happens if I overdose? Seek emergency medical attention or call the Poison Help line at 1-861.660.5840. What should I avoid while using clindamycin? Antibiotic medicines can cause diarrhea, which may be a sign of a new infection.  If you have diarrhea that is watery or prescribed. Every effort has been made to ensure that the information provided by [address] is accurate, up-to-date, and complete, but no guarantee is made to that effect. Drug information contained herein may be time sensitive. [facility] information has been compiled for use by healthcare practitioners and consumers in the [place] and therefore [facility] does not warrant that uses outside of the [place] are appropriate, unless specifically indicated otherwise. [facility]'s drug information does not endorse drugs, diagnose patients or recommend therapy. [facility]'s drug information is an informational resource designed to assist licensed healthcare practitioners in caring for their patients and/or to serve consumers viewing this service as a supplement to, and not a substitute for, the expertise, skill, knowledge and judgment of healthcare practitioners. The absence of a warning for a given drug or drug combination in no way should be construed to indicate that the drug or drug combination is safe, effective or appropriate for any given patient. [facility] does not assume any responsibility for any aspect of healthcare administered with the aid of information [facility] provides. The information contained herein is not intended to cover all possible uses, directions, precautions, warnings, drug interactions, allergic reactions, or adverse effects. If you have questions about the drugs you are taking, check with your doctor, nurse or pharmacist.  Copyright 1907-8693 [address]: 12.01. Revision date: 6/25/2019. Care instructions adapted under license by [facility] ([facility]). If you have questions about a medical condition or this instruction, always ask your healthcare professional. [place] [address] any warranty or liability for your use of this information.

## 2021-03-26 NOTE — PROGRESS NOTES
Subjective:      Patient ID: Basil Greer is a 80 y.o. female. Chief Complaint   Patient presents with    Other     ER follow-up        HPI  Patient presents today to follow-up on recent ED visit related to. Patient was seen at [facility] on 3/22/2021 with complaints of left hip pain/bruising resulting from a fall 1 week prior and left lower leg redness, swelling and drainage x2 days. X-ray of left tib-fib showed no acute bony abnormalities, XR of left hip showed no acute bony abnormalities and x-ray of left knee showed no acute bony abnormalities. Patient was diagnosed with cellulitis of the left lower extremity and discharged home on oral antibiotics. Patient started the antibiotics on 3/23/2021 and reports she has had 2 full days of antibiotics as prescribed. Patient is also taking an over-the-counter probiotic daily. Today patient reports left hip is slightly tender however is significantly improved and bruising has mostly resolved. Patient/niece is concerned about left lower extremity edema, redness and swelling. Left lower leg is no longer draining or weepy. Patient admits she is not elevating lower extremity and slept sitting up in the chair all night last night. Patient denies increased pain or warmth. Cellulitis  Basil Greer is here for evaluation of cellulitis. Current length of antibiotic treatment duration: 2 days. Medication related side effects: none. Appetite has been unchanged. Energy level has been unchanged. Patient has not had weight loss.    Past Medical History:   Diagnosis Date    Atherosclerosis of native coronary artery of native heart without angina pectoris     cath 1/12 , 4/12 (occluded RCa with patent collateral flow    Chronic atrial fibrillation ([address] 1/12    Closed left hip fracture ([address] 1/16    THR    DDD (degenerative disc disease), cervical     Emphysema of lung ([address] 2007    mild    GERD without esophagitis 2001    gerd with stricture    Hyperlipidemia     Osteopenia 8/11    recheck DEXA 2014    Primary osteoarthritis involving multiple joints     Routine health maintenance     TAC 9/99, DEXA 03    Urinary incontinence     urge     Past Surgical History:   Procedure Laterality Date    APPENDECTOMY      CATARACT REMOVAL      CHOLECYSTECTOMY      COLONOSCOPY  6/12    Dr Patel Proper ENDOSCOPY, COLON, DIAGNOSTIC      HYSTERECTOMY      JOINT REPLACEMENT  01/2016    left hip    JOINT REPLACEMENT Left 09/23/2016    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     Family History   Problem Relation Age of Onset    Arthritis Mother     Cancer Mother     Heart Disease Mother     High Blood Pressure Mother     Arthritis Father     Early Death Father     Heart Disease Father     Substance Abuse Father     Arthritis Sister     High Blood Pressure Sister     Arthritis Paternal Grandmother        Review of Systems   Constitutional: Negative for appetite change, chills and fever. HENT: Negative. Respiratory: Negative. Cardiovascular: Negative. Gastrointestinal: Negative. Genitourinary: Negative. Musculoskeletal: Positive for arthralgias (left hip). Skin: Positive for color change (Redness, warmth and edema of left lower extremity). Neurological: Negative. Psychiatric/Behavioral: Negative.         Patient Active Problem List   Diagnosis    Urinary incontinence    Panlobular emphysema ([address]    DDD (degenerative disc disease), cervical    Primary osteoarthritis involving multiple joints    GERD without esophagitis    Atherosclerosis of native coronary artery of native heart without angina pectoris    Chronic atrial fibrillation    Pure hypercholesterolemia    Primary osteoarthritis of both knees    Primary osteoarthritis of right knee    Chronic pain of right knee    Tachy-guanaco syndrome ([address]    Late onset Alzheimer's disease without behavioral disturbance ([address]       Outpatient Medications Marked as Substance Use Topics    Alcohol use: No     Alcohol/week: 0.0 standard drinks       Objective:   /69   Pulse 57   Temp 98 °F (36.7 °C) (Oral)   Wt 154 lb 6.4 oz (70 kg)   SpO2 94%   BMI 29.17 kg/m²     Physical Exam  Vitals signs and nursing note reviewed. Constitutional:       General: She is not in acute distress. Appearance: Normal appearance. She is well-groomed. She is not ill-appearing or toxic-appearing. Comments: Very hard of hearing   HENT:      Head: Normocephalic and atraumatic. Eyes:      Extraocular Movements: Extraocular movements intact. Conjunctiva/sclera: Conjunctivae normal.   Neck:      Musculoskeletal: Normal range of motion and neck supple. Thyroid: No thyromegaly. Cardiovascular:      Rate and Rhythm: Normal rate and regular rhythm. Pulses: Normal pulses. Heart sounds: Normal heart sounds, S1 normal and S2 normal. No murmur. No friction rub. No gallop. Pulmonary:      Effort: Pulmonary effort is normal. No accessory muscle usage or respiratory distress. Breath sounds: Normal breath sounds. No decreased breath sounds, wheezing, rhonchi or rales. Abdominal:      General: Bowel sounds are normal.      Palpations: Abdomen is soft. Musculoskeletal: Normal range of motion. Left lower le+ Edema present. Skin:     General: Skin is warm and dry. Findings: Erythema present. Neurological:      Mental Status: She is alert and oriented to person, place, and time. Psychiatric:         Behavior: Behavior is cooperative. Assessment/Plan:   1. Cellulitis of left lower extremity  Patient presents today to follow-up on recent ED visit related to left hip pain from previous fall and redness, warmth, swelling and drainage from left lower extremity. As previously noted patient was diagnosed with cellulitis and discharged on antibiotics and probiotics.   X-ray of left tib-fib and left knee showed no acute abnormalities however did show osteopiña of the left knee, edema in the subcutaneous soft tissues in the left lower extremity and mild osteoarthritic changes of the left hip. Patient reports pain in left hip is significantly improved and bruising has almost resolved. Niece is concerned about left lower extremity edema and redness. It does appear that the redness has slightly increased since ED visit however again patient has only had 2 days of antibiotics. I suspect edema is impeding healing. Wrapped left lower extremity with Ace wrap. Advised patient and niece on how to wrap left lower extremity while up during the day. Instructed to remove Ace before going to bed. Advised of the importance of elevating left lower extremity intermittently, take antibiotics as prescribed and recommend patient follow up at the first of next week for reevaluation. Patient/niece verbalized understanding and agreeable to plan. XR Tib/fib left 3/22/2021  Impression   Total left knee replacement in good position with and diffuse osteopenia with   no acute bony abnormality.       Moderate cellulitis and/or edema in the subcutaneous soft tissues around the   calf and extending inferiorly. XR left knee 3/22/2021  Impression   Total left knee replacement which is unchanged with no acute bony abnormality.       Mild cellulitis or edema in the soft tissues around the knee.       Small suprapatellar effusion.       Small suprapatellar effusion. SR left Hip 3/22/2021  Impression   Status post total left hip replacement in good position with no acute bony   abnormality.       Mild osteoarthritic changes in the right hip.       Diffuse osteopenia       Mild edema or cellulitis in the soft tissues lateral to the left hip.

## 2021-03-30 ENCOUNTER — OFFICE VISIT (OUTPATIENT)
Dept: FAMILY MEDICINE CLINIC | Age: 86
End: 2021-03-30
Payer: MEDICARE

## 2021-03-30 VITALS
OXYGEN SATURATION: 96 % | BODY MASS INDEX: 28.91 KG/M2 | WEIGHT: 153 LBS | DIASTOLIC BLOOD PRESSURE: 74 MMHG | HEART RATE: 47 BPM | TEMPERATURE: 98 F | SYSTOLIC BLOOD PRESSURE: 124 MMHG

## 2021-03-30 DIAGNOSIS — L03.116 CELLULITIS OF LEFT LOWER EXTREMITY: ICD-10-CM

## 2021-03-30 DIAGNOSIS — R05.9 COUGH: ICD-10-CM

## 2021-03-30 DIAGNOSIS — R09.89 CHEST CONGESTION: Primary | ICD-10-CM

## 2021-03-30 PROCEDURE — 99213 OFFICE O/P EST LOW 20 MIN: CPT | Performed by: NURSE PRACTITIONER

## 2021-03-30 RX ORDER — FEXOFENADINE HCL 180 MG/1
180 TABLET ORAL DAILY
Qty: 30 TABLET | Refills: 1 | Status: SHIPPED | OUTPATIENT
Start: 2021-03-30 | End: 2021-09-17

## 2021-03-30 ASSESSMENT — ENCOUNTER SYMPTOMS
DIARRHEA: 0
WHEEZING: 0
SORE THROAT: 0
VOMITING: 0
SHORTNESS OF BREATH: 0
COUGH: 1
CHEST TIGHTNESS: 0
RHINORRHEA: 0
NAUSEA: 0
ABDOMINAL PAIN: 0

## 2021-03-30 NOTE — PROGRESS NOTES
CHIEF COMPLAINT  Chief Complaint   Patient presents with    Cellulitis     Lt leg 1 week re-check        HPI   Larisa Bishop is a 80 y.o. female who presents to the office reevaluation of left leg cellulitis. Patient was seen in the emergency department and followed up with provider 1 week ago. Patient reports that symptoms are improving. Patient reports taking antibiotic as prescribed continues treatment at this time. Patient denies any new or worsening redness, swelling, pain, fever or chills. Patient reports eating and drinking appropriately. No nausea, vomiting, diarrhea. Patient accompanied by family member and reports that it has improved as well. Patient has complaints of chest congestion and cough. Patient reports that she has had it before and feels like \"bronchioles are full. \"Patient denies any shortness of breath or wheezing. Patient reports difficult to cough up sputum. No other complaints, modifying factors or associated symptoms. Nursing notes reviewed.    Past Medical History:   Diagnosis Date    Atherosclerosis of native coronary artery of native heart without angina pectoris     cath 1/12 , 4/12 (occluded RCa with patent collateral flow    Chronic atrial fibrillation ([facility] 1/12    Closed left hip fracture ([facility]) 1/16    THR    DDD (degenerative disc disease), cervical     Emphysema of lung ([address] 2007    mild    GERD without esophagitis 2001    gerd with stricture    Hyperlipidemia     Osteopenia 8/11    recheck DEXA 2014    Primary osteoarthritis involving multiple joints     Routine health maintenance     TAC 9/99, DEXA 03    Urinary incontinence     urge     Past Surgical History:   Procedure Laterality Date    APPENDECTOMY      CATARACT REMOVAL      CHOLECYSTECTOMY      COLONOSCOPY  6/12    Dr Jenna Colby, COLON, DIAGNOSTIC      HYSTERECTOMY      JOINT REPLACEMENT  01/2016    left hip    JOINT REPLACEMENT Left 09/23/2016    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     Family History   Problem Relation Age of Onset    Arthritis Mother     Cancer Mother     Heart Disease Mother     High Blood Pressure Mother     Arthritis Father     Early Death Father     Heart Disease Father     Substance Abuse Father     Arthritis Sister     High Blood Pressure Sister     Arthritis Paternal Grandmother      Social History     Socioeconomic History    Marital status:      Spouse name: Not on file    Number of children: Not on file    Years of education: Not on file    Highest education level: Not on file   Occupational History    Not on file   Social Needs    Financial resource strain: Not hard at all    Food insecurity     Worry: Never true     Inability: Never true   [employer] needs     Medical: No     Non-medical: No   Tobacco Use    Smoking status: Former Smoker     Types: Cigarettes    Smokeless tobacco: Never Used    Tobacco comment: quit in 1990   Substance and Sexual Activity    Alcohol use: No     Alcohol/week: 0.0 standard drinks    Drug use: No    Sexual activity: Not Currently   Lifestyle    Physical activity     Days per week: 0 days     Minutes per session: 0 min    Stress: Not at all   Relationships    Social connections     Talks on phone: Once a week     Gets together: Once a week     Attends Religion service: More than 4 times per year     Active member of club or organization: Yes     Attends meetings of clubs or organizations: More than 4 times per year     Relationship status:      Intimate partner violence     Fear of current or ex partner: Not on file     Emotionally abused: Not on file     Physically abused: Not on file     Forced sexual activity: Not on file   Other Topics Concern    Not on file   Social History Narrative    ** Merged History Encounter **          Current Outpatient Medications   Medication Sig Dispense Refill    fexofenadine (MUCINEX ALLERGY) 180 MG tablet Take 1 tablet by mouth daily 30 tablet 1    Lactobacillus (ACIDOPHILUS EXTRA STRENGTH PO) Take by mouth      Multiple Vitamins-Minerals (CENTRUM ADULTS PO) Take by mouth daily      magnesium oxide (MAG-OX) 400 (241.3 Mg) MG TABS tablet TAKE 1 TABLET BY MOUTH DAILY 30 tablet 5    cimetidine (TAGAMET) 300 MG tablet TAKE 1 TABLET BY MOUTH TWICE DAILY 60 tablet 5    donepezil (ARICEPT) 5 MG tablet TAKE 1 TABLET BY MOUTH NIGHTLY 90 tablet 1    omeprazole (PRILOSEC) 20 MG delayed release capsule TAKE 1 CAPSULE BY MOUTH EVERY DAY 90 capsule 1    metoprolol tartrate (LOPRESSOR) 25 MG tablet TAKE 1 TABLET TWICE A  tablet 3    rivaroxaban (XARELTO) 20 MG TABS tablet Take 1 tablet by mouth daily Indications: 5-2-17 SAMPLES--LOT # [id number] EXP 8-19 # 35 90 tablet 3    digoxin (LANOXIN) 125 MCG tablet Take 0.5 tablets by mouth daily 45 tablet 3    furosemide (LASIX) 20 MG tablet Take 1 tablet by mouth daily 90 tablet 3    pravastatin (PRAVACHOL) 20 MG tablet TAKE 1 TABLET ONCE DAILY 90 tablet 3    oxybutynin (DITROPAN) 5 MG tablet TAKE 1 TABLET THREE TIMES DAILY AS NEEDED 270 tablet 1    Cholecalciferol (D3 VITAMIN PO) Take by mouth      famotidine (PEPCID) 20 MG tablet TAKE 1 TABLET TWICE A  tablet 0    nitroGLYCERIN (NITROSTAT) 0.4 MG SL tablet USE 1 TABLET UNDER TONGUE AS NEEDED FOR CHEST PAIN MAY REPEAT EVERY 5M IN. UP TO 3. THEN GO TO ER 25 tablet 0     No current facility-administered medications for this visit. Allergies   Allergen Reactions    Lipitor      pain    Morphine      confusion    Penicillins Hives    Sulfa Antibiotics Nausea And Vomiting       REVIEW OF SYSTEMS  Review of Systems   Constitutional: Negative for activity change, appetite change, chills and fever. HENT: Negative for congestion, rhinorrhea and sore throat. Eyes: Negative for visual disturbance. Respiratory: Positive for cough. Negative for chest tightness, shortness of breath and wheezing.     Cardiovascular: Positive for leg swelling. Negative for chest pain. Gastrointestinal: Negative for abdominal pain, diarrhea, nausea and vomiting. Genitourinary: Negative for dysuria, frequency, hematuria and urgency. Musculoskeletal: Negative for gait problem and myalgias. Skin: Negative for rash. Neurological: Negative for dizziness, weakness, light-headedness, numbness and headaches. Psychiatric/Behavioral: Negative for sleep disturbance. PHYSICAL EXAM  /74   Pulse (!) 47   Temp 98 °F (36.7 °C) (Oral)   Wt 153 lb (69.4 kg)   SpO2 96%   BMI 28.91 kg/m²   Physical Exam  Vitals signs reviewed. Constitutional:       General: She is not in acute distress. Appearance: Normal appearance. She is well-developed. She is not diaphoretic. HENT:      Head: Normocephalic and atraumatic. Right Ear: External ear normal.      Left Ear: External ear normal.      Nose: Nose normal.      Mouth/Throat:      Mouth: Mucous membranes are moist.      Pharynx: Oropharynx is clear. Eyes:      Pupils: Pupils are equal, round, and reactive to light. Neck:      Musculoskeletal: Normal range of motion and neck supple. Vascular: No JVD. Cardiovascular:      Rate and Rhythm: Normal rate. Pulses: Normal pulses. Pulmonary:      Effort: Pulmonary effort is normal. No respiratory distress. Breath sounds: No stridor. No wheezing, rhonchi or rales. Chest:      Chest wall: No tenderness. Abdominal:      General: Bowel sounds are normal.      Palpations: Abdomen is soft. Musculoskeletal: Normal range of motion. General: No swelling or deformity. Left lower leg: Edema present. Skin:     General: Skin is warm and dry. Capillary Refill: Capillary refill takes 2 to 3 seconds. Findings: Erythema present. Comments: See image   Neurological:      Mental Status: She is alert and oriented to person, place, and time.    Psychiatric:         Mood and Affect: Mood normal.         Behavior: Behavior normal.                  ASSESSMENT/PLAN:   1. Chest congestion  Patient reports intermittent episodes of feeling chest congestion and unable to cough up sputum. Patient reports having similar symptoms in the past.  Patient was on Mucinex but unable to recall when she was placed on it by previous providers. No shortness of breath, wheezing, or chest tightness. Patient reports feeling that mucus is thick and unable to cough it up completely. I did recommend patient drinking plenty of fluids to thin mucus as well as take Mucinex as needed. Patient denies any fever chills or change in appetite. Patient verbalized and acknowledges with plan of care and will follow-up for any new or worsening symptoms. - fexofenadine (MUCINEX ALLERGY) 180 MG tablet; Take 1 tablet by mouth daily  Dispense: 30 tablet; Refill: 1    2. Cough  See above #1.  - fexofenadine (MUCINEX ALLERGY) 180 MG tablet; Take 1 tablet by mouth daily  Dispense: 30 tablet; Refill: 1    3. Cellulitis of left lower extremity  Stable. Patient presents today for 1 week reevaluation of left lower leg cellulitis. Patient and family were both verbalized that symptoms are improving that redness and swelling has went down. Patient continues to be on clindamycin and denies any adverse side effects or missed doses. Patient is on probiotics with clindamycin at this time and denies any change in appetite, nausea, vomiting, fever or chills. Patient reports that she has been elevating her leg and resting. I did recommend patient continue with current treatment and management and to follow-up for any new or worsening symptoms. Patient verbalized and acknowledges with plan of care at this time. The note was completed using Dragon voice recognition transcription. Every effort was made to ensure accuracy; however, inadvertent  transcription errors may be present despite my best efforts to edit errors.     Hugh Malik, APRN - CNP

## 2021-04-01 ENCOUNTER — TELEPHONE (OUTPATIENT)
Dept: FAMILY MEDICINE CLINIC | Age: 86
End: 2021-04-01

## 2021-04-01 DIAGNOSIS — M50.30 DDD (DEGENERATIVE DISC DISEASE), CERVICAL: Chronic | ICD-10-CM

## 2021-04-01 DIAGNOSIS — G30.1 LATE ONSET ALZHEIMER'S DISEASE WITHOUT BEHAVIORAL DISTURBANCE (HCC): ICD-10-CM

## 2021-04-01 DIAGNOSIS — G89.29 CHRONIC PAIN OF RIGHT KNEE: Primary | ICD-10-CM

## 2021-04-01 DIAGNOSIS — I48.20 CHRONIC ATRIAL FIBRILLATION (HCC): ICD-10-CM

## 2021-04-01 DIAGNOSIS — L03.116 CELLULITIS OF LEFT LOWER EXTREMITY: Primary | ICD-10-CM

## 2021-04-01 DIAGNOSIS — F02.80 LATE ONSET ALZHEIMER'S DISEASE WITHOUT BEHAVIORAL DISTURBANCE (HCC): ICD-10-CM

## 2021-04-01 DIAGNOSIS — M25.561 CHRONIC PAIN OF RIGHT KNEE: Primary | ICD-10-CM

## 2021-04-01 RX ORDER — DOXYCYCLINE HYCLATE 100 MG
100 TABLET ORAL 2 TIMES DAILY
Qty: 14 TABLET | Refills: 0 | Status: SHIPPED | OUTPATIENT
Start: 2021-04-01 | End: 2021-04-08

## 2021-04-01 RX ORDER — PREDNISONE 20 MG/1
40 TABLET ORAL DAILY
Qty: 10 TABLET | Refills: 0 | Status: SHIPPED | OUTPATIENT
Start: 2021-04-01 | End: 2021-04-06

## 2021-04-06 ENCOUNTER — TELEPHONE (OUTPATIENT)
Dept: FAMILY MEDICINE CLINIC | Age: 86
End: 2021-04-06

## 2021-04-06 DIAGNOSIS — R22.9 SKIN LUMPS: ICD-10-CM

## 2021-04-06 DIAGNOSIS — Z91.81 HISTORY OF RECENT FALL: Primary | ICD-10-CM

## 2021-04-06 DIAGNOSIS — Z96.642 HISTORY OF LEFT HIP REPLACEMENT: ICD-10-CM

## 2021-04-06 NOTE — TELEPHONE ENCOUNTER
Home care nurse calling about patient. She did evaluation with her today and said she is concerned about her hip where she fell recently. She is concerned that it might be out of place, family member said it was not like that before and patient she does have some pain in the hip.   Patient did have xrays at hospital when she fell

## 2021-04-06 NOTE — TELEPHONE ENCOUNTER
Has she fell again or she referring to the fall on 3/22 where she went to the ER and had imaging done.   X-rays of her hip did not reveal any fracture or abnormalities to the joint per the radiologist.

## 2021-04-07 ENCOUNTER — CARE COORDINATION (OUTPATIENT)
Dept: CARE COORDINATION | Age: 86
End: 2021-04-07

## 2021-04-07 NOTE — CARE COORDINATION
Ambulatory Care Coordination Note  4/7/2021  CM Risk Score: 9  Charlson 10 Year Mortality Risk Score: 100%     ACC: Adolph Galeas RN    Summary Note:     Patient informs this ACM she has toe fungus and has been clipping her nails to get rid of it; she is inquiring about applying rubbing alcohol   Explained to patient that alcohol is no longer recommended on wounds and actually slows wound healing. Advised patient to f/u with a podiatrist d/t toe fungus difficult to treat and concern for potential wound from clipping nails  This ACM attempted to contact [employer] regarding foot fungus and nail clipping; left message for her to return call along with this ACM's contact number    ==============  FYI:  Patient returned call  Patient reports feeling overwhelmed d/t multiple call this morning  Patient further reports she missed her call with her [facility]   This ACM attempted contacted her  Janejong Red; left message for her to return call and notified patient  Patient reports she has already heard back from [facility] and is rescheduled for an interview on 4/14/2021    Patient reports she has been doing much better. She has been taking her antibiotics and keeping her leg elevated  She further reports the leg is soft, less red and looks like it is healing  Deferred review of medications today d/t patient's niece's report patient now has pre-packaged medications an nurse recently reviewed. Patient also reporting feeling overwhelmed d/t multiple [facility] appointments  Reviewed fall precautions and COPD assessment.      Spoke with [facility] Nurse-She agrees to assess feet next f/u visit and encourage f/u with podiatry  [facility] nurse further reports PT will be visiting today to assess patient's hip     General Assessment    Do you have any symptoms that are causing concern?: No       COPD Assessment              Symptoms:     Symptom course: stable  Change in chronic cough?: No/At Baseline  Change in sputum?: No/At Baseline  Have you had a recent diagnosis of pneumonia either by PCP or at a hospital?: No         Care Coordination Interventions    Program Enrollment: Complex Care  Referral from Primary Care Provider: No  Suggested Interventions and Community Resources  Medi Set or Pill Pack: Declined  Pharmacist: Not Started  Senior Services: In Process  Other Services or Interventions: [facility] referral and Alzheimer's organization          Goals Addressed    None         Prior to Admission medications    Medication Sig Start Date End Date Taking?  Authorizing Provider   doxycycline hyclate (VIBRA-TABS) 100 MG tablet Take 1 tablet by mouth 2 times daily for 7 days 4/1/21 4/8/21  TYLER Rendon CNP   fexofenadine [facility] ALLERGY) 180 MG tablet Take 1 tablet by mouth daily 3/30/21 5/29/21  TYLER Rendon CNP   Lactobacillus (ACIDOPHILUS EXTRA STRENGTH PO) Take by mouth    Historical Provider, MD   Multiple Vitamins-Minerals (CENTRUM ADULTS PO) Take by mouth daily    Historical Provider, MD   magnesium oxide (MAG-OX) 400 (241.3 Mg) MG TABS tablet TAKE 1 TABLET BY MOUTH DAILY 3/2/21   TYLER Rendon CNP   cimetidine (TAGAMET) 300 MG tablet TAKE 1 TABLET BY MOUTH TWICE DAILY 3/1/21   TYLER Rendon CNP   donepezil (ARICEPT) 5 MG tablet TAKE 1 TABLET BY MOUTH NIGHTLY 9/30/20   Aldo Walls MD   omeprazole (PRILOSEC) 20 MG delayed release capsule TAKE 1 CAPSULE BY MOUTH EVERY DAY 8/14/20   TYLER Rendon CNP   metoprolol tartrate (LOPRESSOR) 25 MG tablet TAKE 1 TABLET TWICE A DAY 7/13/20   Sandra Eduardo MD   rivaroxaban (XARELTO) 20 MG TABS tablet Take 1 tablet by mouth daily Indications: 5-2-17 SAMPLES--LOT # [id number] EXP 8-19 # 35 7/13/20   Sandra Edaurdo MD   digoxin (LANOXIN) 125 MCG tablet Take 0.5 tablets by mouth daily 7/13/20   Sandra Eduardo MD   furosemide (LASIX) 20 MG tablet Take 1 tablet by mouth daily 7/13/20   Sadnra Eduardo MD   pravastatin (PRAVACHOL) 20 MG tablet TAKE 1 TABLET ONCE DAILY 7/13/20   Phuc Cheng MD   oxybutynin (DITROPAN) 5 MG tablet TAKE 1 TABLET THREE TIMES DAILY AS NEEDED 6/11/20   Charley Thomas MD   Cholecalciferol (D3 VITAMIN PO) Take by mouth    Historical Provider, MD   famotidine (PEPCID) 20 MG tablet TAKE 1 TABLET TWICE A DAY 12/16/19   Charley Thomas MD   nitroGLYCERIN (NITROSTAT) 0.4 MG SL tablet USE 1 TABLET UNDER TONGUE AS NEEDED FOR CHEST PAIN MAY REPEAT EVERY 5M IN. UP TO 3.  THEN GO TO ER 12/16/19   Charley Thomas MD   Oxybutynin Chloride (DITROPAN) 5 MG TABS  3/6/14 3/25/15  Charley Thomas MD       Future Appointments   Date Time Provider James Dee   9/20/2021 12:00 PM [facility]YL[facility] Holcomb - [facility]

## 2021-04-07 NOTE — CARE COORDINATION
Attempted to contact patient for CC f/u  First number listed is patient's niece Faisal Kapadia reports she will be taking the patient to have her left hip x-rayed. She further reports the patient is not in any pain and seems to have ROM. Ms. Leanne Knight informs this ACM the patient now has new hearing aides but still having some difficulty hearing and she has a follow up appt to check them  Discussed f/u with cardiology and echo; Ms Leanne Knight reports the patient has been declining to schedule and she is unsure why. Ms Leanne Knight also reports patient now has [facility] and someone is at her home almost every day; she has a nurse and PT/OT along with house keeping.   Ms. Leanne Knight provided an additional number to contact patient  Attempted to contact patient; left HIPPA compliant message and ACM contact number

## 2021-04-09 RX ORDER — DONEPEZIL HYDROCHLORIDE 5 MG/1
5 TABLET, FILM COATED ORAL NIGHTLY
Qty: 90 TABLET | Refills: 1 | Status: SHIPPED | OUTPATIENT
Start: 2021-04-09 | End: 2021-09-17

## 2021-04-09 RX ORDER — OXYBUTYNIN CHLORIDE 5 MG/1
TABLET ORAL
Qty: 270 TABLET | Refills: 1 | Status: SHIPPED | OUTPATIENT
Start: 2021-04-09 | End: 2021-10-19

## 2021-04-09 RX ORDER — OMEPRAZOLE 20 MG/1
CAPSULE, DELAYED RELEASE ORAL
Qty: 90 CAPSULE | Refills: 1 | Status: SHIPPED | OUTPATIENT
Start: 2021-04-09 | End: 2021-09-17

## 2021-04-13 ENCOUNTER — HOSPITAL ENCOUNTER (OUTPATIENT)
Age: 86
Discharge: HOME | End: 2021-04-13
Payer: MEDICARE

## 2021-04-13 ENCOUNTER — TELEPHONE (OUTPATIENT)
Dept: FAMILY MEDICINE CLINIC | Age: 86
End: 2021-04-13

## 2021-04-13 ENCOUNTER — HOSPITAL ENCOUNTER (OUTPATIENT)
Dept: GENERAL RADIOLOGY | Age: 86
Discharge: HOME | End: 2021-04-13
Payer: MEDICARE

## 2021-04-13 DIAGNOSIS — R22.9 SKIN LUMPS: ICD-10-CM

## 2021-04-13 DIAGNOSIS — Z91.81 HISTORY OF RECENT FALL: ICD-10-CM

## 2021-04-13 DIAGNOSIS — Z96.642 HISTORY OF LEFT HIP REPLACEMENT: ICD-10-CM

## 2021-04-13 DIAGNOSIS — B35.1 TOENAIL FUNGUS: Primary | ICD-10-CM

## 2021-04-13 DIAGNOSIS — B35.1 ONYCHOMYCOSIS: ICD-10-CM

## 2021-04-13 PROCEDURE — 73502 X-RAY EXAM HIP UNI 2-3 VIEWS: CPT

## 2021-04-22 NOTE — PROGRESS NOTES
CHIEF COMPLAINT  Chief Complaint   Patient presents with    Leg Problem    Other     Knot on Hip        HPI   Alvin Hernandez is a 80 y.o. female who presents to the office complaining of left hip pain/swelling. Patient reports ongoing symptoms but reports that they are improving. Patient reports that it is now just \"sore. \"  Patient denies any new injuries, traumas or falls. Patient reports ongoing left lower leg swelling, family reports that it has improved. No fever, chills, nausea, vomiting or diarrhea. Patient denies any chest pain or shortness of breath. No other complaints, modifying factors or associated symptoms. Nursing notes reviewed.    Past Medical History:   Diagnosis Date    Atherosclerosis of native coronary artery of native heart without angina pectoris     cath 1/12 , 4/12 (occluded RCa with patent collateral flow    Chronic atrial fibrillation ([facility] [address] 1/12    Closed left hip fracture (HCC) 1/16    THR    DDD (degenerative disc disease), cervical     Emphysema of lung ([facility] [address] 2007    mild    GERD without esophagitis 2001    gerd with stricture    Hyperlipidemia     Osteopenia 8/11    recheck DEXA 2014    Primary osteoarthritis involving multiple joints     Routine health maintenance     TAC 9/99, DEXA 03    Urinary incontinence     urge     Past Surgical History:   Procedure Laterality Date    APPENDECTOMY      CATARACT REMOVAL      CHOLECYSTECTOMY      COLONOSCOPY  6/12    Dr Natali Paiz, COLON, DIAGNOSTIC      HYSTERECTOMY      JOINT REPLACEMENT  01/2016    left hip    JOINT REPLACEMENT Left 09/23/2016    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     Family History   Problem Relation Age of Onset    Arthritis Mother     Cancer Mother     Heart Disease Mother     High Blood Pressure Mother     Arthritis Father     Early Death Father     Heart Disease Father     Substance Abuse Father     Arthritis Sister     High Blood Pressure Sister     STRENGTH PO) Take by mouth      Multiple Vitamins-Minerals (CENTRUM ADULTS PO) Take by mouth daily      magnesium oxide (MAG-OX) 400 (241.3 Mg) MG TABS tablet TAKE 1 TABLET BY MOUTH DAILY 30 tablet 5    cimetidine (TAGAMET) 300 MG tablet TAKE 1 TABLET BY MOUTH TWICE DAILY 60 tablet 5    metoprolol tartrate (LOPRESSOR) 25 MG tablet TAKE 1 TABLET TWICE A  tablet 3    rivaroxaban (XARELTO) 20 MG TABS tablet Take 1 tablet by mouth daily Indications: 5-2-17 SAMPLES--LOT # [id number] EXP 8-19 # 35 90 tablet 3    digoxin (LANOXIN) 125 MCG tablet Take 0.5 tablets by mouth daily 45 tablet 3    furosemide (LASIX) 20 MG tablet Take 1 tablet by mouth daily 90 tablet 3    pravastatin (PRAVACHOL) 20 MG tablet TAKE 1 TABLET ONCE DAILY 90 tablet 3    Cholecalciferol (D3 VITAMIN PO) Take by mouth      famotidine (PEPCID) 20 MG tablet TAKE 1 TABLET TWICE A  tablet 0    nitroGLYCERIN (NITROSTAT) 0.4 MG SL tablet USE 1 TABLET UNDER TONGUE AS NEEDED FOR CHEST PAIN MAY REPEAT EVERY 5M IN. UP TO 3. THEN GO TO ER 25 tablet 0     No current facility-administered medications for this visit. Allergies   Allergen Reactions    Lipitor      pain    Morphine      confusion    Penicillins Hives    Sulfa Antibiotics Nausea And Vomiting       REVIEW OF SYSTEMS  Review of Systems   Constitutional: Negative for activity change, appetite change, chills and fever. HENT: Negative for congestion, rhinorrhea and sore throat. Eyes: Negative for visual disturbance. Respiratory: Negative for cough, chest tightness, shortness of breath and wheezing. Cardiovascular: Positive for leg swelling. Negative for chest pain. Gastrointestinal: Negative for abdominal pain, diarrhea, nausea and vomiting. Genitourinary: Negative for dysuria, frequency, hematuria and urgency. Musculoskeletal: Positive for arthralgias. Negative for gait problem and myalgias. Skin: Negative for rash.    Neurological: Negative for dizziness, weakness, light-headedness, numbness and headaches. Psychiatric/Behavioral: Negative for sleep disturbance. PHYSICAL EXAM  /66   Pulse 76   Temp 98.6 °F (37 °C) (Oral)   Wt 153 lb 2 oz (69.5 kg)   SpO2 98%   BMI 28.93 kg/m²   Physical Exam  Vitals signs reviewed. Constitutional:       General: She is not in acute distress. Appearance: Normal appearance. She is well-developed. She is not diaphoretic. HENT:      Head: Normocephalic and atraumatic. Right Ear: External ear normal.      Left Ear: External ear normal.      Nose: Nose normal.      Mouth/Throat:      Mouth: Mucous membranes are moist.      Pharynx: Oropharynx is clear. Eyes:      Pupils: Pupils are equal, round, and reactive to light. Neck:      Musculoskeletal: Normal range of motion and neck supple. Vascular: No JVD. Cardiovascular:      Rate and Rhythm: Normal rate. Pulses: Normal pulses. Pulmonary:      Effort: Pulmonary effort is normal. No respiratory distress. Breath sounds: No stridor. No wheezing, rhonchi or rales. Chest:      Chest wall: No tenderness. Abdominal:      General: Bowel sounds are normal.      Palpations: Abdomen is soft. Musculoskeletal: Normal range of motion. General: No swelling or deformity. Left upper leg: She exhibits bony tenderness and swelling. Left lower leg: Edema present. Skin:     General: Skin is warm and dry. Capillary Refill: Capillary refill takes 2 to 3 seconds. Findings: Erythema present. Neurological:      Mental Status: She is alert and oriented to person, place, and time. Psychiatric:         Mood and Affect: Mood normal.         Behavior: Behavior normal.          ASSESSMENT/PLAN:   1. Left hip pain  Patient reports left hip pain ongoing since fall approximately 1 month ago. Patient has had multiple imaging with negative findings of the hip. Patient reports that pain has improved and is now just \"sore.   \"Patient denies any recent falls, traumas or injuries since initial fall. Patient reports that area is just swollen behind her left hip. She reports getting around better. Patient has had home health monitoring and assisting. Upon exam I did notice some swelling to left posterior upper femur. I did recommend obtaining imaging for further evaluation. Patient and family members verbalized and acknowledged. Continue with current treatment and monitoring follow-up for any new or worsening symptoms. - XR FEMUR LEFT (MIN 2 VIEWS); Future    2. Left leg swelling  Stable. Patient reports that redness has improved and swelling is starting to get better. Patient instructed on continued to elevate extremity, monitoring salt in diet and wearing compression stockings. Patient and family both verbalized and acknowledged. Patient aware to follow-up for any new or worsening symptoms. The note was completed using Dragon voice recognition transcription. Every effort was made to ensure accuracy; however, inadvertent  transcription errors may be present despite my best efforts to edit errors.     Vin Christensen, APRN - CNP

## 2021-04-23 ENCOUNTER — HOSPITAL ENCOUNTER (OUTPATIENT)
Dept: GENERAL RADIOLOGY | Age: 86
Discharge: HOME | End: 2021-04-23
Payer: MEDICARE

## 2021-04-23 ENCOUNTER — OFFICE VISIT (OUTPATIENT)
Dept: FAMILY MEDICINE CLINIC | Age: 86
End: 2021-04-23
Payer: MEDICARE

## 2021-04-23 ENCOUNTER — TELEPHONE (OUTPATIENT)
Dept: FAMILY MEDICINE CLINIC | Age: 86
End: 2021-04-23

## 2021-04-23 ENCOUNTER — HOSPITAL ENCOUNTER (OUTPATIENT)
Age: 86
Discharge: HOME | End: 2021-04-23
Payer: MEDICARE

## 2021-04-23 VITALS
TEMPERATURE: 98.6 F | OXYGEN SATURATION: 98 % | WEIGHT: 153.13 LBS | SYSTOLIC BLOOD PRESSURE: 112 MMHG | DIASTOLIC BLOOD PRESSURE: 66 MMHG | BODY MASS INDEX: 28.93 KG/M2 | HEART RATE: 76 BPM

## 2021-04-23 DIAGNOSIS — M25.552 LEFT HIP PAIN: Primary | ICD-10-CM

## 2021-04-23 DIAGNOSIS — M79.89 LEFT LEG SWELLING: ICD-10-CM

## 2021-04-23 DIAGNOSIS — M25.552 LEFT HIP PAIN: ICD-10-CM

## 2021-04-23 PROCEDURE — 99213 OFFICE O/P EST LOW 20 MIN: CPT | Performed by: NURSE PRACTITIONER

## 2021-04-23 PROCEDURE — 73552 X-RAY EXAM OF FEMUR 2/>: CPT

## 2021-04-23 ASSESSMENT — ENCOUNTER SYMPTOMS
RHINORRHEA: 0
COUGH: 0
ABDOMINAL PAIN: 0
WHEEZING: 0
NAUSEA: 0
SHORTNESS OF BREATH: 0
SORE THROAT: 0
DIARRHEA: 0
VOMITING: 0
CHEST TIGHTNESS: 0

## 2021-04-23 NOTE — TELEPHONE ENCOUNTER
Continue to follow-up and if patient needs referral for another facility once clarified with insurance okay to place.

## 2021-04-23 NOTE — TELEPHONE ENCOUNTER
Pt stated insurance will not cover home care (OVM) . Told pts niece to call insurance company and see who they do cover and let us know.     FYI

## 2021-04-23 NOTE — PATIENT INSTRUCTIONS
[address] [place] [address], [address] [place] [address]    [phone]           Please read the healthy family handout that you were given and share it with your family. Please compare this printed medication list with your medications at home to be sure they are the same. If you have any medications that are different please contact us immediately at [phone]. Also review your allergies that we have listed, these may also include medications that you have not been able to tolerate, make sure everything listed is correct. If you have any allergies that are different please contact us immediately at [phone].

## 2021-05-26 ENCOUNTER — CARE COORDINATION (OUTPATIENT)
Dept: CARE COORDINATION | Age: 86
End: 2021-05-26

## 2021-06-18 ENCOUNTER — CARE COORDINATION (OUTPATIENT)
Dept: CARE COORDINATION | Age: 86
End: 2021-06-18

## 2021-06-21 RX ORDER — RIVAROXABAN 20 MG/1
TABLET, FILM COATED ORAL
Qty: 90 TABLET | Refills: 3 | Status: SHIPPED | OUTPATIENT
Start: 2021-06-21 | End: 2021-08-03 | Stop reason: SDUPTHER

## 2021-07-27 RX ORDER — DIGOXIN 125 MCG
TABLET ORAL
Qty: 15 TABLET | Refills: 0 | Status: SHIPPED | OUTPATIENT
Start: 2021-07-27 | End: 2021-08-03 | Stop reason: SDUPTHER

## 2021-08-02 NOTE — PROGRESS NOTES
[facility]     Outpatient Follow Up Note    Subjective:  Daniel Tirado is here today for cardiology follow up of CAD, permanent AF, HLD       [place]:  Today patient states she fell the other day while carrying a casserole dish, she states she lost her balance. She states she did not hit her head. States she did not loss consciousness. Was able to roll on side and rise up on her own without assistance. She presents with abrasions to LLE and elbow. Patient currently denies any weight gain, edema, palpitations, chest pain, shortness of breath, and syncope. She states lightheadedness with standing. She takes all medications as prescribed, tolerating well. She states she has been in atrial fibrillation a few times and called the ambulance. She walks without  assistance or cane. PMH:   stable CAD, chronic afib rate controlled anticoagulated on treatment with statins for hyperlipidemia. On 07/13/20, she reported recently doing work outside, [address] In the garden and weeding flowers. She developed CP took 2 nitro which relived the pain. She had intermittent BLE edema she has been taking a couple times a week which has helped with edema. Otherwise she reported doing ok.      12 point ROS negative in all areas as listed below except as in [address]  Constitutional, EENT, pulmonary, GI, , Musculoskeletal, skin, neurological, hematological, endocrine, Psychiatric    Past Medical History:   Diagnosis Date    Atherosclerosis of native coronary artery of native heart without angina pectoris     cath 1/12 , 4/12 (occluded RCa with patent collateral flow    Chronic atrial fibrillation ([address] 1/12    Closed left hip fracture ([address] 1/16    THR    DDD (degenerative disc disease), cervical     Emphysema of lung ([address] 2007    mild    GERD without esophagitis 2001    gerd with stricture    Hyperlipidemia     Osteopenia 8/11    recheck DEXA 2014    Primary osteoarthritis involving multiple joints     Routine health maintenance     TAC 9/99, DEXA 03    Urinary incontinence     urge     Social History:    Social History     Tobacco Use   Smoking Status Former Smoker    Types: Cigarettes   Smokeless Tobacco Never Used   Tobacco Comment    quit in 1990   Family history non contributory  On sister living 2 yrs older apparently doing well in [place]  Current Medications:  Current Outpatient Medications   Medication Sig Dispense Refill    digoxin (LANOXIN) 125 MCG tablet TAKE 1/2 TABLET BY MOUTH EVERY DAY 15 tablet 0    XARELTO 20 MG TABS tablet TAKE 1 TABLET BY MOUTH EVERY DAY 90 tablet 3    donepezil (ARICEPT) 5 MG tablet TAKE 1 TABLET BY MOUTH NIGHTLY 90 tablet 1    omeprazole (PRILOSEC) 20 MG delayed release capsule TAKE 1 CAPSULE BY MOUTH EVERY DAY 90 capsule 1    oxybutynin (DITROPAN) 5 MG tablet TAKE 1 TABLET THREE TIMES DAILY AS NEEDED 270 tablet 1    Lactobacillus (ACIDOPHILUS EXTRA STRENGTH PO) Take by mouth      Multiple Vitamins-Minerals (CENTRUM ADULTS PO) Take by mouth daily      magnesium oxide (MAG-OX) 400 (241.3 Mg) MG TABS tablet TAKE 1 TABLET BY MOUTH DAILY 30 tablet 5    cimetidine (TAGAMET) 300 MG tablet TAKE 1 TABLET BY MOUTH TWICE DAILY 60 tablet 5    metoprolol tartrate (LOPRESSOR) 25 MG tablet TAKE 1 TABLET TWICE A  tablet 3    furosemide (LASIX) 20 MG tablet Take 1 tablet by mouth daily 90 tablet 3    pravastatin (PRAVACHOL) 20 MG tablet TAKE 1 TABLET ONCE DAILY 90 tablet 3    Cholecalciferol (D3 VITAMIN PO) Take by mouth      famotidine (PEPCID) 20 MG tablet TAKE 1 TABLET TWICE A  tablet 0    nitroGLYCERIN (NITROSTAT) 0.4 MG SL tablet USE 1 TABLET UNDER TONGUE AS NEEDED FOR CHEST PAIN MAY REPEAT EVERY 5M IN. UP TO 3. THEN GO TO ER 25 tablet 0     No current facility-administered medications for this visit.      REVIEW OF SYSTEMS:    12 point ROS negative in all areas as listed below except as in [place]  Constitutional, EENT, Cardiovascular, pulmonary, GI, , Musculoskeletal, skin, neurological, hematological, endocrine, Psychiatric      Objective:   PHYSICAL EXAM:        VITALS:    /60   Pulse 66   Ht 5' 1\" (1.549 m)   Wt 150 lb (68 kg)   SpO2 97%   BMI 28.34 kg/m²    No orthostatic hypotension. Wt Readings from Last 3 Encounters:   08/03/21 150 lb (68 kg)   04/23/21 153 lb 2 oz (69.5 kg)   03/30/21 153 lb (69.4 kg)     CONSTITUTIONAL: Cooperative, no apparent distress, and appears well nourished / developed  NEUROLOGIC:  Awake and oriented to person, place and time. PSYCH: Calm affect. SKIN: Warm and dry. HEENT: Sclera non-icteric, normocephalic, neck supple, no elevation of JVP, normal carotid pulses with no bruits and thyroid normal size. LUNGS: Crackles bases of lungs, No increased work of breathing, no wheezing  CARDIOVASCULAR: Irregularly irregular rhythm with no murmurs, gallops, rubs, or abnormal heart sounds, normal PMI. The apical impulses not displaced  JVP less than 8 cm H2O  The carotid upstroke is normal in amplitude and contour without delay or bruit  JVP is not elevated  EXT: no edema, no calf tenderness. Pulses are present bilaterally.     DATA:    Lab Results   Component Value Date    ALT 10 03/22/2021    AST 18 03/22/2021    ALKPHOS 65 03/22/2021    BILITOT 0.5 03/22/2021     Lab Results   Component Value Date    CREATININE 1.0 03/22/2021    BUN 19 03/22/2021     03/22/2021    K 4.1 03/22/2021     03/22/2021    CO2 25 03/22/2021     Lab Results   Component Value Date    TSH 2.03 09/10/2012     Lab Results   Component Value Date    WBC 5.9 03/22/2021    HGB 10.1 (L) 03/22/2021    HCT 30.2 (L) 03/22/2021    MCV 90.9 03/22/2021     03/22/2021     No components found for: CHLPL  Lab Results   Component Value Date    TRIG 61 06/28/2019    TRIG 85 03/01/2018    TRIG 65 02/16/2017     Lab Results   Component Value Date    HDL 61 (H) 03/09/2021    HDL 66 (H) 06/28/2019    HDL 63 (H) 03/01/2018     Lab Results   Component Value Date    LDLCALC 72 03/09/2021    LDLCALC 40 06/28/2019    LDLCALC 50 03/01/2018     Lab Results   Component Value Date    LABVLDL 18 03/09/2021    LABVLDL 12 06/28/2019    LABVLDL 17 03/01/2018     Lab Results   Component Value Date    DIGOXIN 1.0 03/09/2021     Imaging:  Labs from march 2021 reviewed with the patient  EKG 2/24/20   Atrial fibrillation 64 bpm  -  Nonspecific T-abnormality. CT head 1/11/19  No evidence of acute intracranial abnormality. EKG 11/13/18  Atrial fibrillationAbnormal ECGWhen compared with ECG of 07-JAN-2016 08:46,No significant change was foundConfirmed by [facility] Arpita HANLEY MD ([id number]) on 11/13/2018 11:05:05 AM    CXR 11/13/18  Possible left upper lobe pulmonary nodule. Routine chest CT is recommended for further evaluation. No acute disease. 8/25/16 EKG  A Fib - HR 94    CATH 4/24/12  1. Remote, non-dominant right coronary artery occlusion with   collateralization. 2. No significant disease along the left coronary artery. 3. Normal left ventricular systolic function. EF 55%  4. No evidence for aortic dissection. Cath 1/2012. [place]. RCA small nondominant 100%. Nonobstructive LAD/LCx.     ECHO from 4/12 showed Normal LV systolic function. EF 55%. Mild aortic valve sclerosis. Biatrial enlargement. Moderate TR with mildly elevated pulmonary pressure. Mild MR. 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  Chronic atrial fibrillation Yes    Pure hypercholesterolemia     Edema, unspecified type        1. CAD (coronary artery disease) : stable    2. 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 EKG 11/13/18 Atrial fibrillationAbnormal ECGWhen compared with ECG of 07-JAN-2016 08:46,No significant change was foundConfirmed by [facility] Arpita HANLEY MD ([id number]) on 11/13/2018 11:05:05 AM   3.   HLD - well controlled last lipids 6/28/19 see above results

## 2021-08-03 ENCOUNTER — OFFICE VISIT (OUTPATIENT)
Dept: CARDIOLOGY CLINIC | Age: 86
End: 2021-08-03
Payer: MEDICARE

## 2021-08-03 VITALS
HEIGHT: 61 IN | DIASTOLIC BLOOD PRESSURE: 60 MMHG | WEIGHT: 150 LBS | OXYGEN SATURATION: 97 % | HEART RATE: 66 BPM | SYSTOLIC BLOOD PRESSURE: 118 MMHG | BODY MASS INDEX: 28.32 KG/M2

## 2021-08-03 DIAGNOSIS — I48.20 CHRONIC ATRIAL FIBRILLATION (HCC): Primary | ICD-10-CM

## 2021-08-03 DIAGNOSIS — R60.9 EDEMA, UNSPECIFIED TYPE: ICD-10-CM

## 2021-08-03 DIAGNOSIS — E78.00 PURE HYPERCHOLESTEROLEMIA: ICD-10-CM

## 2021-08-03 PROCEDURE — 99214 OFFICE O/P EST MOD 30 MIN: CPT | Performed by: INTERNAL MEDICINE

## 2021-08-03 RX ORDER — DIGOXIN 125 MCG
TABLET ORAL
Qty: 30 TABLET | Refills: 5 | Status: SHIPPED | OUTPATIENT
Start: 2021-08-03 | End: 2022-03-07 | Stop reason: SDUPTHER

## 2021-08-03 RX ORDER — PRAVASTATIN SODIUM 20 MG
TABLET ORAL
Qty: 90 TABLET | Refills: 1 | Status: SHIPPED | OUTPATIENT
Start: 2021-08-03 | End: 2021-12-03 | Stop reason: SDUPTHER

## 2021-08-03 RX ORDER — FUROSEMIDE 20 MG/1
20 TABLET ORAL DAILY
Qty: 90 TABLET | Refills: 1 | Status: SHIPPED | OUTPATIENT
Start: 2021-08-03 | End: 2021-12-03 | Stop reason: SDUPTHER

## 2021-08-03 NOTE — LETTER
[facility] [address]  Phone: [phone]  Fax: [phone]    Nancy Zuñiga MD    August 3, 2021     [facility] [place] [address]    Patient: Krystal Reyes   MR Number: <[id number]>   YOB: 1926   Date of Visit: 8/3/2021       Dear Alley Altman: Thank you for referring Diamond Herrera to me for evaluation/treatment. Below are the relevant portions of my assessment and plan of care. If you have questions, please do not hesitate to call me. I look forward to following Jorge Alix along with you.     Sincerely,      Nancy Zuñiga MD

## 2021-08-12 ENCOUNTER — OFFICE VISIT (OUTPATIENT)
Dept: ORTHOPEDIC SURGERY | Age: 86
End: 2021-08-12
Payer: MEDICARE

## 2021-08-12 VITALS — HEIGHT: 61 IN | WEIGHT: 150 LBS | BODY MASS INDEX: 28.32 KG/M2

## 2021-08-12 DIAGNOSIS — M25.561 RIGHT KNEE PAIN, UNSPECIFIED CHRONICITY: Primary | ICD-10-CM

## 2021-08-12 PROCEDURE — 20610 DRAIN/INJ JOINT/BURSA W/O US: CPT | Performed by: ORTHOPAEDIC SURGERY

## 2021-08-12 RX ORDER — BUPIVACAINE HYDROCHLORIDE 2.5 MG/ML
10 INJECTION, SOLUTION INFILTRATION; PERINEURAL ONCE
Status: COMPLETED | OUTPATIENT
Start: 2021-08-12 | End: 2021-08-12

## 2021-08-12 RX ORDER — METHYLPREDNISOLONE ACETATE 40 MG/ML
40 INJECTION, SUSPENSION INTRA-ARTICULAR; INTRALESIONAL; INTRAMUSCULAR; SOFT TISSUE ONCE
Status: COMPLETED | OUTPATIENT
Start: 2021-08-12 | End: 2021-08-12

## 2021-08-12 RX ADMIN — BUPIVACAINE HYDROCHLORIDE 25 MG: 2.5 INJECTION, SOLUTION INFILTRATION; PERINEURAL at 13:28

## 2021-08-12 RX ADMIN — METHYLPREDNISOLONE ACETATE 40 MG: 40 INJECTION, SUSPENSION INTRA-ARTICULAR; INTRALESIONAL; INTRAMUSCULAR; SOFT TISSUE at 13:28

## 2021-08-13 NOTE — PROGRESS NOTES
Recommendation is for a cortisone injection into the right knee. After informed consent was received from the patient, the right knee was injected with 1 mL(40mg)Depo-Medrol and 4 mL of 0.25% Marcaine  in the syringe from an anterolateral joint line approach, using a 25-gauge needle, under sterile Betadine prep, using ethyl chloride as a topical refrigerant, for a diagnosis of osteoarthritis. The patient appeared to tolerate it well. The patient should return here periodically as needed. Encounter Diagnosis   Name Primary?     Right knee pain, unspecified chronicity Yes        Orders Placed This Encounter   Procedures    XR KNEE RIGHT (3 VIEWS)     Standing Status:   Future     Standing Expiration Date:   8/11/2022    [address]168 - [place] DRAIN/INJECT LARGE JOINT/BURSA

## 2021-08-17 ENCOUNTER — CARE COORDINATION (OUTPATIENT)
Dept: CARE COORDINATION | Age: 86
End: 2021-08-17

## 2021-08-17 NOTE — CARE COORDINATION
Spoke briefly with patient; she denies any concerns  Patient ending call because she has plans to leave house. Patient agrees to f/u call later this week.

## 2021-08-23 DIAGNOSIS — R25.2 MUSCLE CRAMPS: ICD-10-CM

## 2021-08-23 RX ORDER — CIMETIDINE 300 MG/1
TABLET, FILM COATED ORAL
Qty: 60 TABLET | Refills: 5 | Status: SHIPPED | OUTPATIENT
Start: 2021-08-23 | End: 2022-02-03

## 2021-09-17 DIAGNOSIS — R09.89 CHEST CONGESTION: ICD-10-CM

## 2021-09-17 DIAGNOSIS — R05.9 COUGH: ICD-10-CM

## 2021-09-17 RX ORDER — FEXOFENADINE HYDROCHLORIDE 180 MG/1
TABLET ORAL
Qty: 30 TABLET | Refills: 1 | Status: SHIPPED | OUTPATIENT
Start: 2021-09-17 | End: 2021-11-16

## 2021-09-17 RX ORDER — DONEPEZIL HYDROCHLORIDE 5 MG/1
5 TABLET, FILM COATED ORAL NIGHTLY
Qty: 90 TABLET | Refills: 1 | Status: SHIPPED | OUTPATIENT
Start: 2021-09-17 | End: 2022-03-31

## 2021-09-17 RX ORDER — OMEPRAZOLE 20 MG/1
CAPSULE, DELAYED RELEASE ORAL
Qty: 90 CAPSULE | Refills: 1 | Status: SHIPPED | OUTPATIENT
Start: 2021-09-17 | End: 2022-03-31

## 2021-09-20 ENCOUNTER — OFFICE VISIT (OUTPATIENT)
Dept: FAMILY MEDICINE CLINIC | Age: 86
End: 2021-09-20
Payer: MEDICARE

## 2021-09-20 VITALS
OXYGEN SATURATION: 95 % | TEMPERATURE: 99.1 F | WEIGHT: 147 LBS | BODY MASS INDEX: 27.78 KG/M2 | HEART RATE: 69 BPM | SYSTOLIC BLOOD PRESSURE: 122 MMHG | DIASTOLIC BLOOD PRESSURE: 68 MMHG

## 2021-09-20 DIAGNOSIS — J43.1 PANLOBULAR EMPHYSEMA (HCC): ICD-10-CM

## 2021-09-20 DIAGNOSIS — K21.9 GERD WITHOUT ESOPHAGITIS: Primary | ICD-10-CM

## 2021-09-20 DIAGNOSIS — F02.80 LATE ONSET ALZHEIMER'S DISEASE WITHOUT BEHAVIORAL DISTURBANCE (HCC): ICD-10-CM

## 2021-09-20 DIAGNOSIS — G30.1 LATE ONSET ALZHEIMER'S DISEASE WITHOUT BEHAVIORAL DISTURBANCE (HCC): ICD-10-CM

## 2021-09-20 DIAGNOSIS — E78.00 PURE HYPERCHOLESTEROLEMIA: ICD-10-CM

## 2021-09-20 DIAGNOSIS — M15.9 PRIMARY OSTEOARTHRITIS INVOLVING MULTIPLE JOINTS: ICD-10-CM

## 2021-09-20 DIAGNOSIS — I25.10 ATHEROSCLEROSIS OF NATIVE CORONARY ARTERY OF NATIVE HEART WITHOUT ANGINA PECTORIS: ICD-10-CM

## 2021-09-20 DIAGNOSIS — Z23 NEED FOR IMMUNIZATION AGAINST INFLUENZA: ICD-10-CM

## 2021-09-20 DIAGNOSIS — I48.20 CHRONIC ATRIAL FIBRILLATION (HCC): ICD-10-CM

## 2021-09-20 PROCEDURE — 4040F PNEUMOC VAC/ADMIN/RCVD: CPT | Performed by: NURSE PRACTITIONER

## 2021-09-20 PROCEDURE — G0008 ADMIN INFLUENZA VIRUS VAC: HCPCS | Performed by: NURSE PRACTITIONER

## 2021-09-20 PROCEDURE — G8427 DOCREV CUR MEDS BY ELIG CLIN: HCPCS | Performed by: NURSE PRACTITIONER

## 2021-09-20 PROCEDURE — G8417 CALC BMI ABV UP PARAM F/U: HCPCS | Performed by: NURSE PRACTITIONER

## 2021-09-20 PROCEDURE — 3023F SPIROM DOC REV: CPT | Performed by: NURSE PRACTITIONER

## 2021-09-20 PROCEDURE — G8926 SPIRO NO PERF OR DOC: HCPCS | Performed by: NURSE PRACTITIONER

## 2021-09-20 PROCEDURE — 99214 OFFICE O/P EST MOD 30 MIN: CPT | Performed by: NURSE PRACTITIONER

## 2021-09-20 PROCEDURE — 90694 VACC AIIV4 NO PRSRV 0.5ML IM: CPT | Performed by: NURSE PRACTITIONER

## 2021-09-20 PROCEDURE — 1036F TOBACCO NON-USER: CPT | Performed by: NURSE PRACTITIONER

## 2021-09-20 PROCEDURE — 1123F ACP DISCUSS/DSCN MKR DOCD: CPT | Performed by: NURSE PRACTITIONER

## 2021-09-20 PROCEDURE — 1090F PRES/ABSN URINE INCON ASSESS: CPT | Performed by: NURSE PRACTITIONER

## 2021-09-20 ASSESSMENT — ENCOUNTER SYMPTOMS
WHEEZING: 0
DIARRHEA: 0
SORE THROAT: 0
COUGH: 0
NAUSEA: 0
ABDOMINAL PAIN: 0
SHORTNESS OF BREATH: 0
RHINORRHEA: 0
VOMITING: 0
CHEST TIGHTNESS: 0

## 2021-09-20 NOTE — PATIENT INSTRUCTIONS
Please read the healthy family handout that you were given and share it with your family. Please compare this printed medication list with your medications at home to be sure they are the same. If you have any medications that are different please contact us immediately at [phone]. Also review your allergies that we have listed, these may also include medications that you have not been able to tolerate, make sure everything listed is correct. If you have any allergies that are different please contact us immediately at [phone]. Patient Education        Alzheimer's Disease: Care Instructions  Overview     Alzheimer's disease is a type of dementia. It causes memory loss and affects judgment, language, and behavior. You may have trouble making decisions or may get lost in places that you used to know well. Alzheimer's disease is different than mild memory loss that occurs with aging. It's not clear what causes Alzheimer's disease. It's the most common form of dementia in older adults. Although there is no cure at this time, medicine in some cases may slow memory loss for a while. Other medicines may help with sleep, depression, or behavior changes. Alzheimer's disease is different for everyone. Some people can function well for a long time. In the early stage of the disease, you can do things at home to make life easier and safer. You also can keep doing your hobbies and other activities. Many people find comfort in planning now for their future needs. Follow-up care is a key part of your treatment and safety. Be sure to make and go to all appointments, and call your doctor if you are having problems. It's also a good idea to know your test results and keep a list of the medicines you take. How can you care for yourself at home? Taking care of yourself  · If your doctor gives you medicines, take them exactly as prescribed. Call your doctor if you think you are having a problem with your medicine. You will get more details on the medicines your doctor prescribes. · Eat a balanced diet. Get plenty of whole grains, fruits, and vegetables every day. If you are not hungry at mealtimes, eat snacks at midmorning and in the afternoon. Try drinks such as Boost, Ensure, or Sustacal if you are having trouble keeping your weight up. · Stay active. Exercise such as walking may slow the decline of your mental abilities. Try to stay active mentally too. Read and work crossword puzzles if you enjoy these activities. · If you have trouble sleeping, do not nap during the day. Get regular exercise (but not within several hours of bedtime). Drink a glass of warm milk or caffeine-free herbal tea before going to bed. · Ask your doctor about support groups and other resources in your area. They can help people who have Alzheimer's disease and their families. · Be patient. You may find that a task takes you longer than it used to. · If you have not already done so, make a list of advance directives. Advance directives are instructions to your doctor and family members about what kind of care you want if you become unable to speak or express yourself. Talk to a  about making a will, if you do not already have one. Keeping schedules  · Develop a routine. You will feel less frustrated or confused if you have a clear, simple plan of what to do every day. ? Make lists of your medicines and when to take them. ? Write down appointments and other tasks in a calendar. ? Put sticky notes around the house to help you remember events and other things you have to do. ? Schedule activities and tasks for times of the day when you are best able to handle them. Staying safe  · Tell someone when you are going out and where you are going. Let the person know when you will be back. Before you go out alone, write down where you are going, how to get there, and how to get back home.  Do this even if you have gone there many times before. Take someone along with you when possible. · Make your home safe. Tack down rugs, put no-slip tape in the tub, use handrails, and put safety switches on stoves and appliances. · Have a family member or other caregiver tell you whether you are driving badly. Deciding to stop driving is very hard for many people. Driving helps you feel independent. Your state 's license bureau can do a driving test if there is any question. Plan for other means of getting around when you are no longer able to drive. · Use strong lighting, especially at night. Put night-lights in bedrooms, hallways, and bathrooms. · Lower the hot water temperature setting to 120°F or lower to avoid burns. When should you call for help? Call 911 anytime you think you may need emergency care. For example, call if:    · You are lost and do not know whom to call.     · You are injured and do not know whom to call. Call your doctor now or seek immediate medical care if:    · Your symptoms suddenly get much worse. Watch closely for changes in your health, and be sure to contact your doctor if:    · You want more information about how you can take care of yourself. Where can you learn more? Go to [email or web address].[employer]. org and sign in to your [employer] account. Enter Y179 in the [facility] box to learn more about \"Alzheimer's Disease: Care Instructions. \"     If you do not have an account, please click on the \"Sign Up Now\" link. Current as of: September 23, 2020               Content Version: 12.9  © 2006-2021 [employer]. Care instructions adapted under license by [facility] ([facility]). If you have questions about a medical condition or this instruction, always ask your healthcare professional. [place] [address] any warranty or liability for your use of this information.

## 2021-09-20 NOTE — PROGRESS NOTES
CHIEF COMPLAINT  Chief Complaint   Patient presents with   Tino Mendieta     GERD        HPI   Torres Carrillo is a 80 y.o. female who presents to the office checkup. Patient presents with daughter. No recent changes in medications or medical illness. Patient follows with cardiologist and orthopedics on a regular basis. Patient reports taking her medications as prescribed. Patient reports that recently they have divided her medications into pill packs and occasionally she will forget the afternoon medication. No episodes of dizziness, lightheadedness, falls. No chest pain or shortness of breath. No abdominal pain or discomfort. No nausea, vomiting, diarrhea. No other complaints, modifying factors or associated symptoms. Nursing notes reviewed.    Past Medical History:   Diagnosis Date    Atherosclerosis of native coronary artery of native heart without angina pectoris     cath 1/12 , 4/12 (occluded RCa with patent collateral flow    Chronic atrial fibrillation ([address] 1/12    Closed left hip fracture (HCC) 1/16    THR    DDD (degenerative disc disease), cervical     Emphysema of lung ([address] 2007    mild    GERD without esophagitis 2001    gerd with stricture    Hyperlipidemia     Osteopenia 8/11    recheck DEXA 2014    Primary osteoarthritis involving multiple joints     Routine health maintenance     TAC 9/99, DEXA 03    Urinary incontinence     urge     Past Surgical History:   Procedure Laterality Date    APPENDECTOMY      CATARACT REMOVAL      CHOLECYSTECTOMY      COLONOSCOPY  6/12    Dr Tomas Glass, COLON, DIAGNOSTIC      HYSTERECTOMY      JOINT REPLACEMENT  01/2016    left hip    JOINT REPLACEMENT Left 09/23/2016    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     Family History   Problem Relation Age of Onset    Arthritis Mother     Cancer Mother     Heart Disease Mother     High Blood Pressure Mother     Arthritis Father     Early Death Father     Heart Disease Father     Substance Abuse Father     Arthritis Sister     High Blood Pressure Sister     Arthritis Paternal Grandmother      Social History     Socioeconomic History    Marital status:      Spouse name: Not on file    Number of children: Not on file    Years of education: Not on file    Highest education level: Not on file   Occupational History    Not on file   Tobacco Use    Smoking status: Former Smoker     Types: Cigarettes    Smokeless tobacco: Never Used    Tobacco comment: quit in 1990   Vaping Use    Vaping Use: Never used   Substance and Sexual Activity    Alcohol use: No     Alcohol/week: 0.0 standard drinks    Drug use: No    Sexual activity: Not Currently   Other Topics Concern    Not on file   Social History Narrative    ** Merged History Encounter **          Social Determinants of Health     Financial Resource Strain:     Difficulty of Paying Living Expenses:    Food Insecurity:     Worried About Running Out of Food in the Last Year:     [address] in the Last Year:    Transportation Needs:     Lack of Transportation (Medical):      Lack of Transportation (Non-Medical):    Physical Activity:     Days of Exercise per Week:     Minutes of Exercise per Session:    Stress:     Feeling of Stress :    Social Connections:     Frequency of Communication with Friends and Family:     Frequency of Social Gatherings with Friends and Family:     Attends Gnosticism Services:     Active Member of Clubs or Organizations:     Attends Club or Organization Meetings:     Marital Status:    Intimate Partner Violence:     Fear of Current or Ex-Partner:     Emotionally Abused:     Physically Abused:     Sexually Abused:      Current Outpatient Medications   Medication Sig Dispense Refill    24HR ALLERGY RELIEF 180 MG tablet TAKE 1 TABLET BY MOUTH EVERY DAY 30 tablet 1    omeprazole (PRILOSEC) 20 MG delayed release capsule TAKE 1 CAPSULE BY MOUTH EVERY DAY 90 capsule 1  donepezil (ARICEPT) 5 MG tablet TAKE 1 TABLET BY MOUTH NIGHTLY 90 tablet 1    magnesium oxide (MAG-OX) 400 (241.3 Mg) MG TABS tablet TAKE 1 TABLET BY MOUTH EVERY DAY 30 tablet 5    cimetidine (TAGAMET) 300 MG tablet TAKE 1 TABLET BY MOUTH TWO TIMES A DAY 60 tablet 5    digoxin (LANOXIN) 125 MCG tablet TAKE 1/2 TABLET BY MOUTH EVERY DAY 30 tablet 5    rivaroxaban (XARELTO) 20 MG TABS tablet TAKE 1 TABLET BY MOUTH EVERY DAY 90 tablet 1    metoprolol tartrate (LOPRESSOR) 25 MG tablet TAKE 1 TABLET TWICE A  tablet 1    furosemide (LASIX) 20 MG tablet Take 1 tablet by mouth daily 90 tablet 1    pravastatin (PRAVACHOL) 20 MG tablet TAKE 1 TABLET ONCE DAILY 90 tablet 1    oxybutynin (DITROPAN) 5 MG tablet TAKE 1 TABLET THREE TIMES DAILY AS NEEDED 270 tablet 1    Lactobacillus (ACIDOPHILUS EXTRA STRENGTH PO) Take by mouth      Multiple Vitamins-Minerals (CENTRUM ADULTS PO) Take by mouth daily      Cholecalciferol (D3 VITAMIN PO) Take by mouth      famotidine (PEPCID) 20 MG tablet TAKE 1 TABLET TWICE A  tablet 0    nitroGLYCERIN (NITROSTAT) 0.4 MG SL tablet USE 1 TABLET UNDER TONGUE AS NEEDED FOR CHEST PAIN MAY REPEAT EVERY 5M IN. UP TO 3. THEN GO TO ER 25 tablet 0     No current facility-administered medications for this visit. Allergies   Allergen Reactions    Lipitor      pain    Morphine      confusion    Penicillins Hives    Sulfa Antibiotics Nausea And Vomiting       REVIEW OF SYSTEMS  Review of Systems   Constitutional: Negative for activity change, appetite change, chills and fever. HENT: Negative for congestion, rhinorrhea and sore throat. Eyes: Negative for visual disturbance. Respiratory: Negative for cough, chest tightness, shortness of breath and wheezing. Cardiovascular: Negative for chest pain, palpitations and leg swelling. Gastrointestinal: Negative for abdominal pain, diarrhea, nausea and vomiting.    Genitourinary: Negative for dysuria, frequency, hematuria and urgency. Musculoskeletal: Negative for gait problem and myalgias. Skin: Negative for rash. Neurological: Negative for dizziness, weakness, light-headedness, numbness and headaches. Psychiatric/Behavioral: Negative for self-injury and sleep disturbance. The patient is not nervous/anxious. PHYSICAL EXAM  /68   Pulse 69   Temp 99.1 °F (37.3 °C) (Oral)   Wt 147 lb (66.7 kg)   SpO2 95%   BMI 27.78 kg/m²   Physical Exam  Vitals reviewed. Constitutional:       General: She is not in acute distress. Appearance: Normal appearance. She is well-developed. She is not diaphoretic. HENT:      Head: Normocephalic and atraumatic. Right Ear: Tympanic membrane normal.      Left Ear: Tympanic membrane normal.      Nose: Nose normal.      Mouth/Throat:      Mouth: Mucous membranes are moist.      Pharynx: Oropharynx is clear. No oropharyngeal exudate or posterior oropharyngeal erythema. Eyes:      General:         Right eye: No discharge. Left eye: No discharge. Pupils: Pupils are equal, round, and reactive to light. Neck:      Vascular: No JVD. Cardiovascular:      Rate and Rhythm: Normal rate. Rhythm irregular. Pulses: Normal pulses. Pulmonary:      Effort: Pulmonary effort is normal. No respiratory distress. Breath sounds: No stridor. No wheezing, rhonchi or rales. Chest:      Chest wall: No tenderness. Abdominal:      General: Bowel sounds are normal. There is no distension. Palpations: Abdomen is soft. Tenderness: There is no abdominal tenderness. There is no guarding. Musculoskeletal:         General: No swelling or deformity. Normal range of motion. Cervical back: Normal range of motion and neck supple. Skin:     General: Skin is warm and dry. Capillary Refill: Capillary refill takes less than 2 seconds. Coloration: Skin is not pale. Findings: No bruising, lesion or rash.    Neurological:      Mental Status: She is alert and oriented to person, place, and time. Psychiatric:         Attention and Perception: Attention normal.         Mood and Affect: Mood normal.         Speech: Speech normal.         Behavior: Behavior normal.         Thought Content: Thought content normal.        ASSESSMENT/PLAN:   1. GERD without esophagitis  Stable. Patient compliant with tagament and omeprazole.  No breakthrough symptoms.  Patient avoids foods that trigger her symptoms.  No recent dysphasia, cough, nausea, or indigestion.  Continue current therapy and management follow-up in 6 months, sooner for new or worsening symptoms. 2. 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 Patient currently on Aricept 5 mg daily. No adverse side effects or missed doses. No new or worsening symptoms. Continue with current treatment management follow-up in 6 months, sooner for new or worsening symptoms. 3. Primary osteoarthritis involving multiple joints  Stable. No new or worsening symptoms. Patient compliant with vitamin D supplement. Continue with current treatment management follow-up in 6 months, sooner for new or worsening symptoms. 4. Panlobular emphysema (HCC)  Stable. No recent exacerbations. Continue with current treatment management follow-up in 6 months, sooner for new or worsening symptoms. 5. Atherosclerosis of native coronary artery of native heart without angina pectoris/Chronic atrial fibrillation  Able. Managed by cardiologist.  Patient reports seeing them on a regular basis. No recent changes in medications. Patient compliant with digoxin, Xarelto, metoprolol, furosemide, pravastatin, daily and nitro as needed. Continue with current treatment management follow-up in 6 months, sooner for new or worsening symptoms. 6. Pure hypercholesterolemia  Stable. Patient compliant with pravastatin 20 mg daily.   Continue with current treatment management follow-up in 6 months, sooner for new

## 2021-09-20 NOTE — PROGRESS NOTES
Vaccine Information Sheet, \"Influenza - Inactivated\"  given to Rajesh Moreno, or parent/legal guardian of  Rajesh Moreno and verbalized understanding. Patient responses:    Have you ever had a reaction to a flu vaccine? No  Do you have any current illness? No  Have you ever had [place] Syndrome? No  Do you have a serious allergy to any of the follow: Neomycin, Polymyxin, Thimerosal, eggs or egg products? No    Flu vaccine given per order. Please see immunization tab. Risks and benefits explained. Current VIS given.

## 2021-10-12 ENCOUNTER — CARE COORDINATION (OUTPATIENT)
Dept: CARE COORDINATION | Age: 86
End: 2021-10-12

## 2021-10-12 NOTE — CARE COORDINATION
Attempted to contact patient  Spoke briefly with patient's niece; she reports patient has been doing well  She further reports she will have patient return this [facility]'s call; contact information provided.

## 2021-10-13 ENCOUNTER — CARE COORDINATION (OUTPATIENT)
Dept: CARE COORDINATION | Age: 86
End: 2021-10-13

## 2021-10-13 NOTE — CARE COORDINATION
Ambulatory Care Coordination Note  10/13/2021  CM Risk Score: 5  Charlson 10 Year Mortality Risk Score: 100%     ACC: Casper Villalobos RN    Summary Note:     Patient reports she has continued to f/u with podiatry; no longer using bleach to soak nails  Patient further reports the podiatrist now clips her nails  Reviewed medications and fall risk precautions  Patient now has medications pre-packaged; occasionally misses afternoon dose of Ditropan if busy working in the yard  She verbalizes understanding not to double up on the dose and just resume when next dose due  Patient reports she is very active in caring for her home and yard work; rarely using cane/walker; denies falls  Patient further reports she is careful and adamantly denies need to utilize the cane/waker   Recently started back to [place] and enrolled in [employer]; patient verbalizing happiness to return  Patient reports she now has a cleaning lady that visits weekly   Patient denies need for continued f/u calls  No further outreach scheduled with this ACM; episode of care resolved      General Assessment    Do you have any symptoms that are causing concern?: No         .amb  Care Coordination Interventions    Program Enrollment: Complex Care  Referral from Primary Care Provider: No  Suggested Interventions and Community Resources  Fall Risk Prevention: Declined  Medi Set or Pill Pack: Completed  Pharmacist: Declined  Other Services or Interventions: Patient has house keeper once a week         Goals Addressed    None         Prior to Admission medications    Medication Sig Start Date End Date Taking?  Authorizing Provider   24HR ALLERGY RELIEF 180 MG tablet TAKE 1 TABLET BY MOUTH EVERY DAY 9/17/21  Yes TYLER Ibanez CNP   omeprazole (PRILOSEC) 20 MG delayed release capsule TAKE 1 CAPSULE BY MOUTH EVERY DAY 9/17/21  Yes TYLER Ibanez CNP   donepezil (ARICEPT) 5 MG tablet TAKE 1 TABLET BY MOUTH NIGHTLY 9/17/21  Yes TYLER Ibanez

## 2021-10-19 RX ORDER — OXYBUTYNIN CHLORIDE 5 MG/1
TABLET ORAL
Qty: 19 TABLET | Refills: 1 | Status: SHIPPED | OUTPATIENT
Start: 2021-10-19 | End: 2021-11-16

## 2021-10-19 NOTE — TELEPHONE ENCOUNTER
Zaina Schmidt is requesting refill(s)   Last OV 9/20/21 (pertaining to medication)  LR 4/9/21 (per medication requested)  Next office visit scheduled or attempted Yes   If no, reason:  3/22/22

## 2021-11-16 DIAGNOSIS — R05.9 COUGH: ICD-10-CM

## 2021-11-16 DIAGNOSIS — R09.89 CHEST CONGESTION: ICD-10-CM

## 2021-11-16 RX ORDER — OXYBUTYNIN CHLORIDE 5 MG/1
TABLET ORAL
Qty: 38 TABLET | Refills: 1 | Status: SHIPPED | OUTPATIENT
Start: 2021-11-16 | End: 2022-01-20

## 2021-11-16 RX ORDER — FEXOFENADINE HYDROCHLORIDE 180 MG/1
TABLET ORAL
Qty: 30 TABLET | Refills: 1 | Status: SHIPPED | OUTPATIENT
Start: 2021-11-16 | End: 2022-01-20

## 2021-12-03 ENCOUNTER — OFFICE VISIT (OUTPATIENT)
Dept: CARDIOLOGY CLINIC | Age: 86
End: 2021-12-03
Payer: MEDICARE

## 2021-12-03 VITALS
BODY MASS INDEX: 27.68 KG/M2 | OXYGEN SATURATION: 96 % | SYSTOLIC BLOOD PRESSURE: 102 MMHG | DIASTOLIC BLOOD PRESSURE: 66 MMHG | HEIGHT: 61 IN | HEART RATE: 72 BPM | WEIGHT: 146.6 LBS

## 2021-12-03 DIAGNOSIS — I48.20 CHRONIC ATRIAL FIBRILLATION (HCC): Primary | ICD-10-CM

## 2021-12-03 DIAGNOSIS — I25.10 ATHEROSCLEROSIS OF NATIVE CORONARY ARTERY OF NATIVE HEART WITHOUT ANGINA PECTORIS: ICD-10-CM

## 2021-12-03 DIAGNOSIS — R60.9 EDEMA, UNSPECIFIED TYPE: ICD-10-CM

## 2021-12-03 DIAGNOSIS — I49.5 TACHY-BRADY SYNDROME (HCC): ICD-10-CM

## 2021-12-03 DIAGNOSIS — E78.00 PURE HYPERCHOLESTEROLEMIA: ICD-10-CM

## 2021-12-03 DIAGNOSIS — Z79.899 MEDICATION MANAGEMENT: ICD-10-CM

## 2021-12-03 PROCEDURE — 4040F PNEUMOC VAC/ADMIN/RCVD: CPT | Performed by: INTERNAL MEDICINE

## 2021-12-03 PROCEDURE — G8427 DOCREV CUR MEDS BY ELIG CLIN: HCPCS | Performed by: INTERNAL MEDICINE

## 2021-12-03 PROCEDURE — G8417 CALC BMI ABV UP PARAM F/U: HCPCS | Performed by: INTERNAL MEDICINE

## 2021-12-03 PROCEDURE — 99214 OFFICE O/P EST MOD 30 MIN: CPT | Performed by: INTERNAL MEDICINE

## 2021-12-03 PROCEDURE — 1123F ACP DISCUSS/DSCN MKR DOCD: CPT | Performed by: INTERNAL MEDICINE

## 2021-12-03 PROCEDURE — 1036F TOBACCO NON-USER: CPT | Performed by: INTERNAL MEDICINE

## 2021-12-03 PROCEDURE — 1090F PRES/ABSN URINE INCON ASSESS: CPT | Performed by: INTERNAL MEDICINE

## 2021-12-03 PROCEDURE — G8484 FLU IMMUNIZE NO ADMIN: HCPCS | Performed by: INTERNAL MEDICINE

## 2021-12-03 RX ORDER — FUROSEMIDE 20 MG/1
20 TABLET ORAL DAILY
Qty: 90 TABLET | Refills: 3 | Status: SHIPPED | OUTPATIENT
Start: 2021-12-03

## 2021-12-03 RX ORDER — PRAVASTATIN SODIUM 20 MG
TABLET ORAL
Qty: 90 TABLET | Refills: 3 | Status: SHIPPED | OUTPATIENT
Start: 2021-12-03

## 2021-12-03 NOTE — LETTER
Hermilo Schofield  12/21/1926    [facility]     Outpatient Follow Up Note    Subjective:  Hermilo Schofield is here today for cardiology follow up of CAD, permanent AF, HLD       [place]:  Today, 12/3/2021, she states that she is doing well. She states that she has been active with doing yardwork and tolerates this well. She cuts her own grass and socially active. She states that she had an episode of AF last night, but it has subsided. She states that she is taking her medications as prescribed. Patient denies current edema, chest pain, sob, dizziness or syncope. PMH:   stable CAD, chronic afib rate controlled anticoagulated on treatment with statins for hyperlipidemia. On 07/13/20, she reported recently doing work outside, [address] In the garden and weeding flowers. She developed CP took 2 nitro which relieved the pain. She had intermittent BLE edema she has been taking a diuretic  couple times a week which has helped with edema. Otherwise she reported doing ok.      12 point ROS negative in all areas as listed below except as in [address]  Constitutional, EENT, pulmonary, GI, , Musculoskeletal, skin, neurological, hematological, endocrine, Psychiatric    Past Medical History:   Diagnosis Date    Atherosclerosis of native coronary artery of native heart without angina pectoris     cath 1/12 , 4/12 (occluded RCa with patent collateral flow    Chronic atrial fibrillation ([address] 1/12    Closed left hip fracture ([address] 1/16    THR    DDD (degenerative disc disease), cervical     Emphysema of lung ([address] 2007    mild    GERD without esophagitis 2001    gerd with stricture    Hyperlipidemia     Osteopenia 8/11    recheck DEXA 2014    Primary osteoarthritis involving multiple joints     Routine health maintenance     TAC 9/99, DEXA 03    Urinary incontinence     urge     Social History:    Social History     Tobacco Use   Smoking Status Former Smoker    Types: Cigarettes   Smokeless Tobacco Never Used Tobacco Comment    quit in 1990   Family history non contributory  On sister living 2 yrs older apparently doing well in [place]  Current Medications:  Current Outpatient Medications   Medication Sig Dispense Refill    rivaroxaban (XARELTO) 20 MG TABS tablet TAKE 1 TABLET BY MOUTH EVERY DAY 90 tablet 3    metoprolol tartrate (LOPRESSOR) 25 MG tablet TAKE 1 TABLET TWICE A  tablet 3    furosemide (LASIX) 20 MG tablet Take 1 tablet by mouth daily 90 tablet 3    pravastatin (PRAVACHOL) 20 MG tablet TAKE 1 TABLET ONCE DAILY 90 tablet 3    24HR ALLERGY RELIEF 180 MG tablet TAKE 1 TABLET BY MOUTH EVERY DAY 30 tablet 1    oxybutynin (DITROPAN) 5 MG tablet TAKE 1 TABLET THREE TIMES A DAY AS NEEDED 38 tablet 1    omeprazole (PRILOSEC) 20 MG delayed release capsule TAKE 1 CAPSULE BY MOUTH EVERY DAY 90 capsule 1    donepezil (ARICEPT) 5 MG tablet TAKE 1 TABLET BY MOUTH NIGHTLY 90 tablet 1    magnesium oxide (MAG-OX) 400 (241.3 Mg) MG TABS tablet TAKE 1 TABLET BY MOUTH EVERY DAY 30 tablet 5    cimetidine (TAGAMET) 300 MG tablet TAKE 1 TABLET BY MOUTH TWO TIMES A DAY 60 tablet 5    digoxin (LANOXIN) 125 MCG tablet TAKE 1/2 TABLET BY MOUTH EVERY DAY 30 tablet 5    Lactobacillus (ACIDOPHILUS EXTRA STRENGTH PO) Take by mouth      Multiple Vitamins-Minerals (CENTRUM ADULTS PO) Take by mouth daily      Cholecalciferol (D3 VITAMIN PO) Take by mouth      famotidine (PEPCID) 20 MG tablet TAKE 1 TABLET TWICE A  tablet 0    nitroGLYCERIN (NITROSTAT) 0.4 MG SL tablet USE 1 TABLET UNDER TONGUE AS NEEDED FOR CHEST PAIN MAY REPEAT EVERY 5M IN. UP TO 3. THEN GO TO ER 25 tablet 0     No current facility-administered medications for this visit.      REVIEW OF SYSTEMS:    12 point ROS negative in all areas as listed below except as in [place]  Constitutional, EENT, Cardiovascular, pulmonary, GI, , Musculoskeletal, skin, neurological, hematological, endocrine, Psychiatric      Objective:   PHYSICAL EXAM:        VITALS: /66   Pulse 72   Ht 5' 1\" (1.549 m)   Wt 146 lb 9.6 oz (66.5 kg)   SpO2 96%   BMI 27.70 kg/m²    No orthostatic hypotension. Wt Readings from Last 3 Encounters:   12/03/21 146 lb 9.6 oz (66.5 kg)   09/20/21 147 lb (66.7 kg)   08/12/21 150 lb (68 kg)     CONSTITUTIONAL: Cooperative, no apparent distress, and appears well nourished / developed  NEUROLOGIC:  Awake and oriented to person, place and time. PSYCH: Calm affect. SKIN: Warm and dry. HEENT: Sclera non-icteric, normocephalic, neck supple, no elevation of JVP, normal carotid pulses with no bruits and thyroid normal size. LUNGS: Crackles bases of lungs, h/o bronchitis No increased work of breathing, no wheezing  CARDIOVASCULAR: Irregularly irregular rhythm with no murmurs, gallops, rubs, or abnormal heart sounds, normal PMI. The apical impulses not displaced  JVP less than 8 cm H2O  The carotid upstroke is normal in amplitude and contour without delay or bruit  JVP is not elevated  EXT: no edema, no calf tenderness. Pulses are present bilaterally.     DATA:    Lab Results   Component Value Date    ALT 10 03/22/2021    AST 18 03/22/2021    ALKPHOS 65 03/22/2021    BILITOT 0.5 03/22/2021     Lab Results   Component Value Date    CREATININE 1.0 03/22/2021    BUN 19 03/22/2021     03/22/2021    K 4.1 03/22/2021     03/22/2021    CO2 25 03/22/2021     Lab Results   Component Value Date    TSH 2.03 09/10/2012     Lab Results   Component Value Date    WBC 5.9 03/22/2021    HGB 10.1 (L) 03/22/2021    HCT 30.2 (L) 03/22/2021    MCV 90.9 03/22/2021     03/22/2021     No components found for: CHLPL  Lab Results   Component Value Date    TRIG 61 06/28/2019    TRIG 85 03/01/2018    TRIG 65 02/16/2017     Lab Results   Component Value Date    HDL 61 (H) 03/09/2021    HDL 66 (H) 06/28/2019    HDL 63 (H) 03/01/2018     Lab Results   Component Value Date    LDLCALC 72 03/09/2021    LDLCALC 40 06/28/2019    LDLCALC 50 03/01/2018     Lab Results Component Value Date    LABVLDL 18 03/09/2021    LABVLDL 12 06/28/2019    LABVLDL 17 03/01/2018     Lab Results   Component Value Date    DIGOXIN 1.0 03/09/2021     Imaging:  Labs from march 2021 reviewed with the patient  EKG 2/24/20   Atrial fibrillation 64 bpm  -  Nonspecific T-abnormality. CT head 1/11/19  No evidence of acute intracranial abnormality. EKG 11/13/18  Atrial fibrillationAbnormal ECGWhen compared with ECG of 07-JAN-2016 08:46,No significant change was foundConfirmed by [facility] ERROLWALisa WONG MD ([id number]) on 11/13/2018 11:05:05 AM    CXR 11/13/18  Possible left upper lobe pulmonary nodule. Routine chest CT is recommended for further evaluation. No acute disease. 8/25/16 EKG  A Fib - HR 94    CATH 4/24/12  1. Remote, non-dominant right coronary artery occlusion with   collateralization. 2. No significant disease along the left coronary artery. 3. Normal left ventricular systolic function. EF 55%  4. No evidence for aortic dissection. Cath 1/2012. [facility] RCA small nondominant 100%. Nonobstructive LAD/LCx.     ECHO from 4/12 showed Normal LV systolic function. EF 55%. Mild aortic valve sclerosis. Biatrial enlargement. Moderate TR with mildly elevated pulmonary pressure. Mild MR. 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 CAD (coronary artery disease) : stable    2. 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  EKG 11/13/18 Atrial fibrillationAbnormal ECGWhen compared with ECG of 07-JAN-2016 08:46,No significant change was foundConfirmed by [facility] Lisa HANLEY MD ([id number]) on 11/13/2018 11:05:05 AM 3.  HLD - well controlled last lipids 6/28/19 see above results     4. Angina pectoris. stable       Plan:   1. A cup of warm/hot beverage in morning  will help with open up your airway and make it easier to clear the secreations. 2. Continue with current medications. 3. Blood work prior to next visit- fasting  4. Follow up in 3 months. Peggi Holter, RN, am scribing for and in the presence of Dr. Helena Guzman. Sasha Patten RN    QUALITY MEASURES  1. Tobacco Cessation Counseling: NA  2. Retake of BP if >140/90:   NA  3. Documentation to PCP/referring for new patient:  Sent to PCP at close of office visit  4. CAD patient on anti-platelet: anticoagulated  5. CAD patient on STATIN therapy:  Yes  6. Patient with CHF and aFib on anticoagulation:  Yes     I, Dr. Helena Guzman, personally performed the services described in this documentation, as scribed by the above signed scribe in my presence. It is both accurate and complete to my knowledge. I agree with the details independently gathered by the clinical support staff, while the remaining scribed note accurately describes my personal service to the patient.                 Christopher Rodriguez MD

## 2021-12-03 NOTE — PATIENT INSTRUCTIONS
Plan:   1. A cup of warm/hot beverage will help with open up your airway. 2. Continue with current medications. 3. Blood work prior to next visit- fasting  4. Follow up in 3 months.

## 2021-12-03 NOTE — PROGRESS NOTES
[facility]     Outpatient Follow Up Note    Subjective:  Ji Ramirez is here today for cardiology follow up of CAD, permanent AF, HLD       [place]:  Today, 12/3/2021, she states that she is doing well. She states that she has been active with doing yardwork and tolerates this well. She cuts her own grass and socially active. She states that she had an episode of AF last night, but it has subsided. She states that she is taking her medications as prescribed. Patient denies current edema, chest pain, sob, dizziness or syncope. PMH:   stable CAD, chronic afib rate controlled anticoagulated on treatment with statins for hyperlipidemia. On 07/13/20, she reported recently doing work outside, [address] In the garden and weeding flowers. She developed CP took 2 nitro which relieved the pain. She had intermittent BLE edema she has been taking a diuretic  couple times a week which has helped with edema. Otherwise she reported doing ok.      12 point ROS negative in all areas as listed below except as in [address]  Constitutional, EENT, pulmonary, GI, , Musculoskeletal, skin, neurological, hematological, endocrine, Psychiatric    Past Medical History:   Diagnosis Date    Atherosclerosis of native coronary artery of native heart without angina pectoris     cath 1/12 , 4/12 (occluded RCa with patent collateral flow    Chronic atrial fibrillation ([address] 1/12    Closed left hip fracture ([address] 1/16    THR    DDD (degenerative disc disease), cervical     Emphysema of lung ([address] 2007    mild    GERD without esophagitis 2001    gerd with stricture    Hyperlipidemia     Osteopenia 8/11    recheck DEXA 2014    Primary osteoarthritis involving multiple joints     Routine health maintenance     TAC 9/99, DEXA 03    Urinary incontinence     urge     Social History:    Social History     Tobacco Use   Smoking Status Former Smoker    Types: Cigarettes   Smokeless Tobacco Never Used   Tobacco Comment    quit in 1990 Family history non contributory  On sister living 2 yrs older apparently doing well in [place]  Current Medications:  Current Outpatient Medications   Medication Sig Dispense Refill    rivaroxaban (XARELTO) 20 MG TABS tablet TAKE 1 TABLET BY MOUTH EVERY DAY 90 tablet 3    metoprolol tartrate (LOPRESSOR) 25 MG tablet TAKE 1 TABLET TWICE A  tablet 3    furosemide (LASIX) 20 MG tablet Take 1 tablet by mouth daily 90 tablet 3    pravastatin (PRAVACHOL) 20 MG tablet TAKE 1 TABLET ONCE DAILY 90 tablet 3    24HR ALLERGY RELIEF 180 MG tablet TAKE 1 TABLET BY MOUTH EVERY DAY 30 tablet 1    oxybutynin (DITROPAN) 5 MG tablet TAKE 1 TABLET THREE TIMES A DAY AS NEEDED 38 tablet 1    omeprazole (PRILOSEC) 20 MG delayed release capsule TAKE 1 CAPSULE BY MOUTH EVERY DAY 90 capsule 1    donepezil (ARICEPT) 5 MG tablet TAKE 1 TABLET BY MOUTH NIGHTLY 90 tablet 1    magnesium oxide (MAG-OX) 400 (241.3 Mg) MG TABS tablet TAKE 1 TABLET BY MOUTH EVERY DAY 30 tablet 5    cimetidine (TAGAMET) 300 MG tablet TAKE 1 TABLET BY MOUTH TWO TIMES A DAY 60 tablet 5    digoxin (LANOXIN) 125 MCG tablet TAKE 1/2 TABLET BY MOUTH EVERY DAY 30 tablet 5    Lactobacillus (ACIDOPHILUS EXTRA STRENGTH PO) Take by mouth      Multiple Vitamins-Minerals (CENTRUM ADULTS PO) Take by mouth daily      Cholecalciferol (D3 VITAMIN PO) Take by mouth      famotidine (PEPCID) 20 MG tablet TAKE 1 TABLET TWICE A  tablet 0    nitroGLYCERIN (NITROSTAT) 0.4 MG SL tablet USE 1 TABLET UNDER TONGUE AS NEEDED FOR CHEST PAIN MAY REPEAT EVERY 5M IN. UP TO 3. THEN GO TO ER 25 tablet 0     No current facility-administered medications for this visit.      REVIEW OF SYSTEMS:    12 point ROS negative in all areas as listed below except as in [place]  Constitutional, EENT, Cardiovascular, pulmonary, GI, , Musculoskeletal, skin, neurological, hematological, endocrine, Psychiatric      Objective:   PHYSICAL EXAM:        VITALS:    /66   Pulse 72   Ht 5' 1\" (1.549 m)   Wt 146 lb 9.6 oz (66.5 kg)   SpO2 96%   BMI 27.70 kg/m²    No orthostatic hypotension. Wt Readings from Last 3 Encounters:   12/03/21 146 lb 9.6 oz (66.5 kg)   09/20/21 147 lb (66.7 kg)   08/12/21 150 lb (68 kg)     CONSTITUTIONAL: Cooperative, no apparent distress, and appears well nourished / developed  NEUROLOGIC:  Awake and oriented to person, place and time. PSYCH: Calm affect. SKIN: Warm and dry. HEENT: Sclera non-icteric, normocephalic, neck supple, no elevation of JVP, normal carotid pulses with no bruits and thyroid normal size. LUNGS: Crackles bases of lungs, h/o bronchitis No increased work of breathing, no wheezing  CARDIOVASCULAR: Irregularly irregular rhythm with no murmurs, gallops, rubs, or abnormal heart sounds, normal PMI. The apical impulses not displaced  JVP less than 8 cm H2O  The carotid upstroke is normal in amplitude and contour without delay or bruit  JVP is not elevated  EXT: no edema, no calf tenderness. Pulses are present bilaterally.     DATA:    Lab Results   Component Value Date    ALT 10 03/22/2021    AST 18 03/22/2021    ALKPHOS 65 03/22/2021    BILITOT 0.5 03/22/2021     Lab Results   Component Value Date    CREATININE 1.0 03/22/2021    BUN 19 03/22/2021     03/22/2021    K 4.1 03/22/2021     03/22/2021    CO2 25 03/22/2021     Lab Results   Component Value Date    TSH 2.03 09/10/2012     Lab Results   Component Value Date    WBC 5.9 03/22/2021    HGB 10.1 (L) 03/22/2021    HCT 30.2 (L) 03/22/2021    MCV 90.9 03/22/2021     03/22/2021     No components found for: CHLPL  Lab Results   Component Value Date    TRIG 61 06/28/2019    TRIG 85 03/01/2018    TRIG 65 02/16/2017     Lab Results   Component Value Date    HDL 61 (H) 03/09/2021    HDL 66 (H) 06/28/2019    HDL 63 (H) 03/01/2018     Lab Results   Component Value Date    LDLCALC 72 03/09/2021    LDLCALC 40 06/28/2019    LDLCALC 50 03/01/2018     Lab Results   Component Value Date    LABVLDL 18 03/09/2021    LABVLDL 12 06/28/2019    LABVLDL 17 03/01/2018     Lab Results   Component Value Date    DIGOXIN 1.0 03/09/2021     Imaging:  Labs from march 2021 reviewed with the patient  EKG 2/24/20   Atrial fibrillation 64 bpm  -  Nonspecific T-abnormality. CT head 1/11/19  No evidence of acute intracranial abnormality. EKG 11/13/18  Atrial fibrillationAbnormal ECGWhen compared with ECG of 07-JAN-2016 08:46,No significant change was foundConfirmed by [facility] Meghna HANLEY MD ([id number]) on 11/13/2018 11:05:05 AM    CXR 11/13/18  Possible left upper lobe pulmonary nodule. Routine chest CT is recommended for further evaluation. No acute disease. 8/25/16 EKG  A Fib - HR 94    CATH 4/24/12  1. Remote, non-dominant right coronary artery occlusion with   collateralization. 2. No significant disease along the left coronary artery. 3. Normal left ventricular systolic function. EF 55%  4. No evidence for aortic dissection. Cath 1/2012. [facility] RCA small nondominant 100%. Nonobstructive LAD/LCx.     ECHO from 4/12 showed Normal LV systolic function. EF 55%. Mild aortic valve sclerosis. Biatrial enlargement. Moderate TR with mildly elevated pulmonary pressure. Mild MR. 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 CAD (coronary artery disease) : stable    2. 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 EKG 11/13/18 Atrial fibrillationAbnormal ECGWhen compared with ECG of 07-JAN-2016 08:46,No significant change was foundConfirmed by [facility] Meg[facility]na HANLEY MD ([id number]) on 11/13/2018 11:05:05 AM   3.   HLD - well controlled last lipids 6/28/19 see above results     4. Angina pectoris. stable       Plan:   1. A cup of warm/hot beverage in morning  will help with open up your airway and make it easier to clear the secreations. 2. Continue with current medications. 3. Blood work prior to next visit- fasting  4. Follow up in 3 months. Anil Cueva RN, am scribing for and in the presence of Dr. Zain Dang. Hansel Weiss RN    QUALITY MEASURES  1. Tobacco Cessation Counseling: NA  2. Retake of BP if >140/90:   NA  3. Documentation to PCP/referring for new patient:  Sent to PCP at close of office visit  4. CAD patient on anti-platelet: anticoagulated  5. CAD patient on STATIN therapy:  Yes  6. Patient with CHF and aFib on anticoagulation:  Yes     I, Dr. Zain Dang, personally performed the services described in this documentation, as scribed by the above signed scribe in my presence. It is both accurate and complete to my knowledge. I agree with the details independently gathered by the clinical support staff, while the remaining scribed note accurately describes my personal service to the patient.                 James Stockton MD

## 2021-12-06 ENCOUNTER — VIRTUAL VISIT (OUTPATIENT)
Dept: FAMILY MEDICINE CLINIC | Age: 86
End: 2021-12-06
Payer: MEDICARE

## 2021-12-06 DIAGNOSIS — Z00.00 ROUTINE GENERAL MEDICAL EXAMINATION AT A HEALTH CARE FACILITY: Primary | ICD-10-CM

## 2021-12-06 PROCEDURE — G8484 FLU IMMUNIZE NO ADMIN: HCPCS | Performed by: NURSE PRACTITIONER

## 2021-12-06 PROCEDURE — G0439 PPPS, SUBSEQ VISIT: HCPCS | Performed by: NURSE PRACTITIONER

## 2021-12-06 PROCEDURE — 1123F ACP DISCUSS/DSCN MKR DOCD: CPT | Performed by: NURSE PRACTITIONER

## 2021-12-06 PROCEDURE — 4040F PNEUMOC VAC/ADMIN/RCVD: CPT | Performed by: NURSE PRACTITIONER

## 2021-12-06 ASSESSMENT — PATIENT HEALTH QUESTIONNAIRE - PHQ9
SUM OF ALL RESPONSES TO PHQ QUESTIONS 1-9: 0
SUM OF ALL RESPONSES TO PHQ9 QUESTIONS 1 & 2: 0
2. FEELING DOWN, DEPRESSED OR HOPELESS: 0
1. LITTLE INTEREST OR PLEASURE IN DOING THINGS: 0
SUM OF ALL RESPONSES TO PHQ QUESTIONS 1-9: 0
SUM OF ALL RESPONSES TO PHQ QUESTIONS 1-9: 0

## 2021-12-06 ASSESSMENT — LIFESTYLE VARIABLES
HOW OFTEN DO YOU HAVE A DRINK CONTAINING ALCOHOL: 0
AUDIT-C TOTAL SCORE: INCOMPLETE
AUDIT TOTAL SCORE: INCOMPLETE
HOW OFTEN DO YOU HAVE A DRINK CONTAINING ALCOHOL: NEVER

## 2021-12-06 NOTE — PROGRESS NOTES
Medicare Annual Wellness Visit  Are Name: Yue Hawkins Date: 2021   MRN: <[id number]> Sex: Female   Age: 80 y.o. Ethnicity: Non- / Non    : 1926 Race: White (non-)      Anish Carballo is here for Medicare AWV    Screenings for behavioral, psychosocial and functional/safety risks, and cognitive dysfunction are all negative except as indicated below. These results, as well as other patient data from the [address] form, are documented in Flowsheets linked to this Encounter. Allergies   Allergen Reactions    Lipitor      pain    Morphine      confusion    Penicillins Hives    Sulfa Antibiotics Nausea And Vomiting         Prior to Visit Medications    Medication Sig Taking?  Authorizing Provider   rivaroxaban (XARELTO) 20 MG TABS tablet TAKE 1 TABLET BY MOUTH EVERY DAY Yes Lamine Zavaleta MD   metoprolol tartrate (LOPRESSOR) 25 MG tablet TAKE 1 TABLET TWICE A DAY Yes Lamine Zavaleta MD   furosemide (LASIX) 20 MG tablet Take 1 tablet by mouth daily Yes aLmine Zavaleta MD   pravastatin (PRAVACHOL) 20 MG tablet TAKE 1 TABLET ONCE DAILY Yes Lamine Zavaleta MD   24HR ALLERGY RELIEF 180 MG tablet TAKE 1 TABLET BY MOUTH EVERY DAY Yes TYLER Salgado CNP   oxybutynin (DITROPAN) 5 MG tablet TAKE 1 TABLET THREE TIMES A DAY AS NEEDED Yes TYLER Salgado CNP   omeprazole (PRILOSEC) 20 MG delayed release capsule TAKE 1 CAPSULE BY MOUTH EVERY DAY Yes TYLER Salgado CNP   donepezil (ARICEPT) 5 MG tablet TAKE 1 TABLET BY MOUTH NIGHTLY Yes TYLER Salgado CNP   magnesium oxide (MAG-OX) 400 (241.3 Mg) MG TABS tablet TAKE 1 TABLET BY MOUTH EVERY DAY Yes TYLER Salgado CNP   cimetidine (TAGAMET) 300 MG tablet TAKE 1 TABLET BY MOUTH TWO TIMES A DAY Yes TYLER Salgado CNP   digoxin (LANOXIN) 125 MCG tablet TAKE 1/2 TABLET BY MOUTH EVERY DAY Yes Lamine Zavaleta MD   Lactobacillus (ACIDOPHILUS EXTRA STRENGTH PO) Take by mouth Yes Historical Provider, MD   Multiple Vitamins-Minerals (CENTRUM ADULTS PO) Take by mouth daily Yes Historical Provider, MD   Cholecalciferol (D3 VITAMIN PO) Take by mouth Yes Historical Provider, MD   famotidine (PEPCID) 20 MG tablet TAKE 1 TABLET TWICE A DAY Yes Nathen Acosta MD   nitroGLYCERIN (NITROSTAT) 0.4 MG SL tablet USE 1 TABLET UNDER TONGUE AS NEEDED FOR CHEST PAIN MAY REPEAT EVERY 5M IN. UP TO 3.  THEN GO TO ER Yes Nathen Acosta MD   Oxybutynin Chloride (Marylee Rosalva) 5 MG TABS   Nathen Acosta MD         Past Medical History:   Diagnosis Date    Atherosclerosis of native coronary artery of native heart without angina pectoris     cath 1/12 , 4/12 (occluded RCa with patent collateral flow    Chronic atrial fibrillation ([address] 1/12    Closed left hip fracture ([facility] [address] 1/16    THR    DDD (degenerative disc disease), cervical     Emphysema of lung ([address] 2007    mild    GERD without esophagitis 2001    gerd with stricture    Hyperlipidemia     Osteopenia 8/11    recheck DEXA 2014    Primary osteoarthritis involving multiple joints     Routine health maintenance     TAC 9/99, DEXA 03    Urinary incontinence     urge       Past Surgical History:   Procedure Laterality Date    APPENDECTOMY      CATARACT REMOVAL      CHOLECYSTECTOMY      COLONOSCOPY  6/12    Dr Jennifer Altman, COLON, DIAGNOSTIC      HYSTERECTOMY      JOINT REPLACEMENT  01/2016    left hip    JOINT REPLACEMENT Left 09/23/2016    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         Family History   Problem Relation Age of Onset    Arthritis Mother     Cancer Mother     Heart Disease Mother     High Blood Pressure Mother     Arthritis Father     Early Death Father     Heart Disease Father     Substance Abuse Father     Arthritis Sister     High Blood Pressure Sister     Arthritis Paternal Grandmother        CareTeam (Including outside providers/suppliers regularly involved in providing care):   Patient Care Team:  Tia Small APRN - CNP as PCP - General (Nurse Practitioner)  TYLER Pearl CNP as PCP - [facility] Empaneled Provider  Lara Cardenas MD (Orthopedic Surgery)  Aristides Alvarez MD as Consulting Physician (Cardiology)    Wt Readings from Last 3 Encounters:   12/03/21 146 lb 9.6 oz (66.5 kg)   09/20/21 147 lb (66.7 kg)   08/12/21 150 lb (68 kg)      No flowsheet data found. There is no height or weight on file to calculate BMI. Based upon direct observation of the patient, evaluation of cognition reveals remote memory intact, recent memory impaired. Due to this being a telephone encounter no physical exam.    Patient's complete Health Risk Assessment and screening values have been reviewed and are found in Flowsheets. The following problems were reviewed today and where indicated follow up appointments were made and/or referrals ordered. Positive Risk Factor Screenings with Interventions:           Health Habits/Nutrition:  Health Habits/Nutrition  Do you exercise for at least 20 minutes 2-3 times per week?: Yes  Have you lost any weight without trying in the past 3 months?: (!) Yes  Do you eat only one meal per day?: No  Have you seen the dentist within the past year?: Yes     Health Habits/Nutrition Interventions:  · Nutritional issues:  educational materials for healthy, well-balanced diet provided     Safety:  Safety  Do you have working smoke detectors?: Yes  Have all throw rugs been removed or fastened?: Yes  Do you have non-slip mats or surfaces in all bathtubs/showers?: Yes  Do all of your stairways have a railing or banister?: Yes  Are your doorways, halls and stairs free of clutter?: (!) No  Do you always fasten your seatbelt when you are in a car?: Yes  Safety Interventions:  · Home safety tips provided  · Patient declines any further evaluation/treatment for this issue    ADL:  ADLs  In the past 7 days, did you need help from others to perform any of the following everyday activities?  Eating, dressing, grooming, bathing, toileting, or walking/balance?: (!) Walking/Balance  In the past 7 days, did you need help from others to take care of any of the following?  Laundry, housekeeping, banking/finances, shopping, telephone use, food preparation, transportation, or taking medications?: (!) Housekeeping, Transportation  ADL Interventions:  · Patient declines any further evaluation/treatment for this issue    Personalized Preventive Plan   Current Health Maintenance Status  Immunization History   Administered Date(s) Administered    COVID-19, Staci Dotson, Primary or Immunocompromised, PF, 100mcg/0.5mL 01/26/2021, 03/02/2021    Influenza 10/12/2010, 09/13/2011, 09/10/2012, 11/14/2013    Influenza Vaccine, unspecified formulation 09/24/2015, 10/12/2016    Influenza Virus Vaccine 10/01/2008, 10/30/2014    Influenza, High Dose (Fluzone 65 yrs and older) 11/08/2018    Influenza, Quadv, IM, (6 mo and older Fluzone, Flulaval, Fluarix and 3 yrs and older Afluria) 10/26/2017    Influenza, Quadv, adjuvanted, 65 yrs +, IM, PF (Fluad) 09/20/2021    Influenza, Triv, inactivated, subunit, adjuvanted, IM (Fluad 65 yrs and older) 11/15/2019    Pneumococcal Conjugate 13-valent ([id number]) 06/03/2016    Pneumococcal Polysaccharide ([id number]) 01/01/2009    Td, unspecified formulation 02/01/1992    Tdap (Boostrix, Adacel) 07/17/2017    Zoster Live (Zostavax) 01/05/2010        Health Maintenance   Topic Date Due    Shingles Vaccine (2 of 3) 03/02/2010    Annual Wellness Visit (AWV)  Never done    COVID-19 Vaccine (3 - Booster for Moderna series) 09/02/2021    Lipid screen  03/09/2022    Potassium monitoring  03/22/2022    Creatinine monitoring  03/22/2022    DTaP/Tdap/Td vaccine (2 - Td or Tdap) 07/17/2027    Flu vaccine  Completed    Pneumococcal 65+ years Vaccine  Completed    Hepatitis A vaccine  Aged Out    Hepatitis B vaccine  Aged Out    Hib vaccine  Aged Out    Meningococcal (ACWY) vaccine  Aged Out Recommendations for Preventive Services Due: see orders and patient instructions/AVS.  . Recommended screening schedule for the next 5-10 years is provided to the patient in written form: see Patient Instructions/AVS.    Mari James was seen today for medicare awv. Diagnoses and all orders for this visit:    Routine general medical examination at a health care facility           Hortencia Liao is a 80 y.o. female being evaluated by a Virtual Visit (phone) encounter to address concerns as mentioned above. A caregiver was present when appropriate. Due to this being a TeleHealth encounter (During CYVCS-15 public health emergency), evaluation of the following organ systems was limited: Vitals/Constitutional/EENT/Resp/CV/GI//MS/Neuro/Skin/Heme-Lymph-Imm. Pursuant to the emergency declaration under the [address] [place] [address], [address] [place] [address] authority and the [employer] and [employer] Act, this Virtual Visit was conducted with patient's (and/or legal guardian's) consent, to reduce the patient's risk of exposure to COVID-19 and provide necessary medical care. The patient (and/or legal guardian) has also been advised to contact this office for worsening conditions or problems, and seek emergency medical treatment and/or call 911 if deemed necessary. Patient identification was verified at the start of the visit: Yes    Services were provided through phone to substitute for in-person clinic visit. Patient and provider were located at their individual homes. --TYLER Joya - CNP on 12/6/2021 at 1:53 PM    An electronic signature was used to authenticate this note.

## 2021-12-06 NOTE — PATIENT INSTRUCTIONS
Please read the healthy family handout that you were given and share it with your family. Please compare this printed medication list with your medications at home to be sure they are the same. If you have any medications that are different please contact us immediately at [phone]. Also review your allergies that we have listed, these may also include medications that you have not been able to tolerate, make sure everything listed is correct. If you have any allergies that are different please contact us immediately at [phone]. Personalized Preventive Plan for Hermilo Schofield - 12/6/2021  Medicare offers a range of preventive health benefits. Some of the tests and screenings are paid in full while other may be subject to a deductible, co-insurance, and/or copay. Some of these benefits include a comprehensive review of your medical history including lifestyle, illnesses that may run in your family, and various assessments and screenings as appropriate. After reviewing your medical record and screening and assessments performed today your provider may have ordered immunizations, labs, imaging, and/or referrals for you. A list of these orders (if applicable) as well as your Preventive Care list are included within your After Visit Summary for your review. Other Preventive Recommendations:    · A preventive eye exam performed by an eye specialist is recommended every 1-2 years to screen for glaucoma; cataracts, macular degeneration, and other eye disorders. · A preventive dental visit is recommended every 6 months. · Try to get at least 150 minutes of exercise per week or 10,000 steps per day on a pedometer . · Order or download the FREE \"Exercise & Physical Activity: Your Everyday Guide\" from [employer]. Call 6-918.898.7598 or search [employer] online. · You need 4390-4940 mg of calcium and 5671-4685 IU of vitamin D per day.  It is possible to meet your calcium requirement with diet alone, but a vitamin D supplement is usually necessary to meet this goal.  · When exposed to the sun, use a sunscreen that protects against both UVA and UVB radiation with an SPF of 30 or greater. Reapply every 2 to 3 hours or after sweating, drying off with a towel, or swimming. · Always wear a seat belt when traveling in a car. Always wear a helmet when riding a bicycle or motorcycle.

## 2022-01-19 DIAGNOSIS — R09.89 CHEST CONGESTION: ICD-10-CM

## 2022-01-19 DIAGNOSIS — R05.9 COUGH: ICD-10-CM

## 2022-01-20 RX ORDER — OXYBUTYNIN CHLORIDE 5 MG/1
TABLET ORAL
Qty: 90 TABLET | Refills: 1 | Status: SHIPPED | OUTPATIENT
Start: 2022-01-20 | End: 2022-03-31

## 2022-01-20 RX ORDER — FEXOFENADINE HYDROCHLORIDE 180 MG/1
TABLET ORAL
Qty: 30 TABLET | Refills: 1 | Status: SHIPPED | OUTPATIENT
Start: 2022-01-20 | End: 2022-03-31

## 2022-01-20 NOTE — TELEPHONE ENCOUNTER
Future appt scheduled 03/22/2022               Last appt 12/06/2021      Last Written     oxybutynin (DITROPAN) 5 MG tablet  11/16/2021  #38  1 RF     24HR ALLERGY RELIEF 180 MG tablet  11/16/2021  #30  1 RF

## 2022-02-03 DIAGNOSIS — R25.2 MUSCLE CRAMPS: ICD-10-CM

## 2022-02-03 RX ORDER — CIMETIDINE 300 MG/1
TABLET, FILM COATED ORAL
Qty: 60 TABLET | Refills: 5 | Status: SHIPPED | OUTPATIENT
Start: 2022-02-03 | End: 2022-08-26

## 2022-02-25 ENCOUNTER — HOSPITAL ENCOUNTER (OUTPATIENT)
Age: 87
Discharge: HOME | End: 2022-02-25
Payer: MEDICARE

## 2022-02-25 DIAGNOSIS — Z79.899 MEDICATION MANAGEMENT: ICD-10-CM

## 2022-02-25 LAB
A/G RATIO: 1.6 (ref 1.1–2.2)
ALBUMIN SERPL-MCNC: 4.1 G/DL (ref 3.4–5)
ALP BLD-CCNC: 53 U/L (ref 40–129)
ALT SERPL-CCNC: 13 U/L (ref 10–40)
ANION GAP SERPL CALCULATED.3IONS-SCNC: 9 MMOL/L (ref 3–16)
AST SERPL-CCNC: 20 U/L (ref 15–37)
BASOPHILS ABSOLUTE: 0 K/UL (ref 0–0.2)
BASOPHILS RELATIVE PERCENT: 0.9 %
BILIRUB SERPL-MCNC: 0.3 MG/DL (ref 0–1)
BUN BLDV-MCNC: 13 MG/DL (ref 7–20)
CALCIUM SERPL-MCNC: 9.4 MG/DL (ref 8.3–10.6)
CHLORIDE BLD-SCNC: 106 MMOL/L (ref 99–110)
CO2: 28 MMOL/L (ref 21–32)
CREAT SERPL-MCNC: 0.9 MG/DL (ref 0.6–1.2)
DIGOXIN LEVEL: <0.3 NG/ML (ref 0.8–2)
EOSINOPHILS ABSOLUTE: 0.4 K/UL (ref 0–0.6)
EOSINOPHILS RELATIVE PERCENT: 7.1 %
GFR AFRICAN AMERICAN: >60
GFR NON-AFRICAN AMERICAN: 58
GLUCOSE BLD-MCNC: 95 MG/DL (ref 70–99)
HCT VFR BLD CALC: 37.2 % (ref 36–48)
HEMOGLOBIN: 12.4 G/DL (ref 12–16)
LYMPHOCYTES ABSOLUTE: 0.9 K/UL (ref 1–5.1)
LYMPHOCYTES RELATIVE PERCENT: 18 %
MCH RBC QN AUTO: 29.3 PG (ref 26–34)
MCHC RBC AUTO-ENTMCNC: 33.4 G/DL (ref 31–36)
MCV RBC AUTO: 87.9 FL (ref 80–100)
MONOCYTES ABSOLUTE: 0.5 K/UL (ref 0–1.3)
MONOCYTES RELATIVE PERCENT: 9 %
NEUTROPHILS ABSOLUTE: 3.3 K/UL (ref 1.7–7.7)
NEUTROPHILS RELATIVE PERCENT: 65 %
PDW BLD-RTO: 14.6 % (ref 12.4–15.4)
PLATELET # BLD: 200 K/UL (ref 135–450)
PMV BLD AUTO: 6.2 FL (ref 5–10.5)
POTASSIUM SERPL-SCNC: 4.4 MMOL/L (ref 3.5–5.1)
RBC # BLD: 4.24 M/UL (ref 4–5.2)
SODIUM BLD-SCNC: 143 MMOL/L (ref 136–145)
TOTAL PROTEIN: 6.6 G/DL (ref 6.4–8.2)
WBC # BLD: 5.1 K/UL (ref 4–11)

## 2022-02-25 PROCEDURE — 85025 COMPLETE CBC W/AUTO DIFF WBC: CPT

## 2022-02-25 PROCEDURE — 80053 COMPREHEN METABOLIC PANEL: CPT

## 2022-02-25 PROCEDURE — 80061 LIPID PANEL: CPT

## 2022-02-25 PROCEDURE — 84443 ASSAY THYROID STIM HORMONE: CPT

## 2022-02-25 PROCEDURE — 80162 ASSAY OF DIGOXIN TOTAL: CPT

## 2022-02-25 PROCEDURE — 36415 COLL VENOUS BLD VENIPUNCTURE: CPT

## 2022-02-26 LAB
CHOLESTEROL, FASTING: 122 MG/DL (ref 0–199)
HDLC SERPL-MCNC: 58 MG/DL (ref 40–60)
LDL CHOLESTEROL CALCULATED: 47 MG/DL
TRIGLYCERIDE, FASTING: 83 MG/DL (ref 0–150)
TSH REFLEX FT4: 2.76 UIU/ML (ref 0.27–4.2)
VLDLC SERPL CALC-MCNC: 17 MG/DL

## 2022-03-03 NOTE — PROGRESS NOTES
[facility]     Outpatient Follow Up Note    Subjective:  Obinna Parrish is here today for cardiology follow up of CAD, permanent AF, HLD  C/o cough, dizzy and balance is off today, redness and swelling in lower left leg below  knee. [place]:  Today, she reports she has been coughing all the way here from [place]. She is coughing up thick yellow and green sputum. She reports she is feeling better but is dizzy today and her balance is off. She reports her left lower leg is swollen and red. Her niece is with her today. She denies any change in medicine. Patient is vaccinated against Covid. Moderna 2/2     PMH:   stable CAD, chronic afib rate controlled anticoagulated on treatment with statins for hyperlipidemia. On 07/13/20, she reported recently doing work outside, [address] In the garden and weeding flowers. She developed CP took 2 nitro which relieved the pain. She had intermittent BLE edema she has been taking a diuretic  couple times a week which has helped with edema. Otherwise she reported doing ok.      12 point ROS negative in all areas as listed below except as in [address]  Constitutional, EENT, pulmonary, GI, , Musculoskeletal, skin, neurological, hematological, endocrine, Psychiatric    Past Medical History:   Diagnosis Date    Atherosclerosis of native coronary artery of native heart without angina pectoris     cath 1/12 , 4/12 (occluded RCa with patent collateral flow    Chronic atrial fibrillation ([address] 1/12    Closed left hip fracture ([address] 1/16    THR    DDD (degenerative disc disease), cervical     Emphysema of lung ([address] 2007    mild    GERD without esophagitis 2001    gerd with stricture    Hyperlipidemia     Osteopenia 8/11    recheck DEXA 2014    Primary osteoarthritis involving multiple joints     Routine health maintenance     TAC 9/99, DEXA 03    Urinary incontinence     urge     Social History:    Social History     Tobacco Use   Smoking Status Former Smoker    Types: Cigarettes   Smokeless Tobacco Never Used   Tobacco Comment    quit in 1990   Family history non contributory  On sister living 2 yrs older apparently doing well in [place]  Current Medications:  Current Outpatient Medications   Medication Sig Dispense Refill    magnesium oxide (MAG-OX) 400 (241.3 Mg) MG TABS tablet TAKE 1 TABLET BY MOUTH EVERY DAY 30 tablet 5    cimetidine (TAGAMET) 300 MG tablet TAKE 1 TABLET BY MOUTH TWO TIMES A DAY 60 tablet 5    24HR ALLERGY RELIEF 180 MG tablet TAKE 1 TABLET BY MOUTH EVERY DAY 30 tablet 1    oxybutynin (DITROPAN) 5 MG tablet TAKE 1 TABLET THREE TIMES A DAY AS NEEDED 90 tablet 1    rivaroxaban (XARELTO) 20 MG TABS tablet TAKE 1 TABLET BY MOUTH EVERY DAY 90 tablet 3    metoprolol tartrate (LOPRESSOR) 25 MG tablet TAKE 1 TABLET TWICE A  tablet 3    furosemide (LASIX) 20 MG tablet Take 1 tablet by mouth daily 90 tablet 3    pravastatin (PRAVACHOL) 20 MG tablet TAKE 1 TABLET ONCE DAILY 90 tablet 3    omeprazole (PRILOSEC) 20 MG delayed release capsule TAKE 1 CAPSULE BY MOUTH EVERY DAY 90 capsule 1    donepezil (ARICEPT) 5 MG tablet TAKE 1 TABLET BY MOUTH NIGHTLY 90 tablet 1    digoxin (LANOXIN) 125 MCG tablet TAKE 1/2 TABLET BY MOUTH EVERY DAY 30 tablet 5    Multiple Vitamins-Minerals (CENTRUM ADULTS PO) Take by mouth daily      Cholecalciferol (D3 VITAMIN PO) Take by mouth      famotidine (PEPCID) 20 MG tablet TAKE 1 TABLET TWICE A  tablet 0    nitroGLYCERIN (NITROSTAT) 0.4 MG SL tablet USE 1 TABLET UNDER TONGUE AS NEEDED FOR CHEST PAIN MAY REPEAT EVERY 5M IN. UP TO 3. THEN GO TO ER 25 tablet 0    Lactobacillus (ACIDOPHILUS EXTRA STRENGTH PO) Take by mouth       No current facility-administered medications for this visit.      REVIEW OF SYSTEMS:    12 point ROS negative in all areas as listed below except as in [place]  Constitutional, EENT, Cardiovascular, pulmonary, GI, , Musculoskeletal, skin, neurological, hematological, endocrine, Psychiatric      Objective:   PHYSICAL EXAM:        VITALS:    /62   Pulse 65   Ht 5' 1\" (1.549 m)   Wt 148 lb 3.2 oz (67.2 kg)   SpO2 94%   BMI 28.00 kg/m²    No orthostatic hypotension. Wt Readings from Last 3 Encounters:   03/07/22 148 lb 3.2 oz (67.2 kg)   12/03/21 146 lb 9.6 oz (66.5 kg)   09/20/21 147 lb (66.7 kg)     CONSTITUTIONAL: Cooperative, no apparent distress, and appears well nourished / developed  NEUROLOGIC:  Awake and oriented to person, place and time. PSYCH: Calm affect. SKIN: Warm and dry cracked in left lower leg. HEENT: Sclera non-icteric, normocephalic, neck supple, no elevation of JVP, normal carotid pulses with no bruits and thyroid normal size. LUNGS: Crackles bases of lungs, h/o bronchitis No increased work of breathing, no wheezing  CARDIOVASCULAR: Irregularly irregular rhythm  with no murmurs, gallops, rubs, or abnormal heart sounds, normal PMI. The apical impulses not displaced  JVP less than 8 cm H2O  The carotid upstroke is normal in amplitude and contour without delay or bruit  JVP is not elevated  EXT: lower left leg is red and itches with trace edma bilaterally, no calf tenderness. Pulses are present bilaterally.  No knee pain or swelling     DATA:    Lab Results   Component Value Date    ALT 13 02/25/2022    AST 20 02/25/2022    ALKPHOS 53 02/25/2022    BILITOT 0.3 02/25/2022     Lab Results   Component Value Date    CREATININE 0.9 02/25/2022    BUN 13 02/25/2022     02/25/2022    K 4.4 02/25/2022     02/25/2022    CO2 28 02/25/2022     Lab Results   Component Value Date    TSH 2.03 09/10/2012     Lab Results   Component Value Date    WBC 5.1 02/25/2022    HGB 12.4 02/25/2022    HCT 37.2 02/25/2022    MCV 87.9 02/25/2022     02/25/2022     No components found for: CHLPL  Lab Results   Component Value Date    TRIG 61 06/28/2019    TRIG 85 03/01/2018    TRIG 65 02/16/2017     Lab Results   Component Value Date    HDL 58 02/25/2022    HDL 61 (H) 03/09/2021    HDL 66 (H) 06/28/2019     Lab Results   Component Value Date    LDLCALC 47 02/25/2022    LDLCALC 72 03/09/2021    LDLCALC 40 06/28/2019     Lab Results   Component Value Date    LABVLDL 17 02/25/2022    LABVLDL 18 03/09/2021    LABVLDL 12 06/28/2019     Lab Results   Component Value Date    DIGOXIN <0.3 (L) 02/25/2022     Imaging:  Labs from march 2021 reviewed with the patient  EKG 2/24/20   Atrial fibrillation 64 bpm  -  Nonspecific T-abnormality. CT head 1/11/19  No evidence of acute intracranial abnormality. EKG 11/13/18  Atrial fibrillationAbnormal ECGWhen compared with ECG of 07-JAN-2016 08:46,No significant change was foundConfirmed by [facility] TALON DAVIS, Sarkis La ([id number]) on 11/13/2018 11:05:05 AM    CXR 11/13/18  Possible left upper lobe pulmonary nodule. Routine chest CT is recommended for further evaluation. No acute disease. 8/25/16 EKG  A Fib - HR 94    CATH 4/24/12  1. Remote, non-dominant right coronary artery occlusion with   collateralization. 2. No significant disease along the left coronary artery. 3. Normal left ventricular systolic function. EF 55%  4. No evidence for aortic dissection. Cath 1/2012. [facility] RCA small nondominant 100%. Nonobstructive LAD/LCx.     ECHO from 4/12 showed Normal LV systolic function. EF 55%. Mild aortic valve sclerosis. Biatrial enlargement. Moderate TR with mildly elevated pulmonary pressure. Mild MR. 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  Chronic atrial fibrillation Yes    Pure hypercholesterolemia     Atherosclerosis of native coronary artery of native heart without angina pectoris     Acute stasis dermatitis of left lower extremity        1. CAD (coronary artery disease) : stable    2. AF (atrial fibrillation) chronic persistent: Rate controlled and a   3.   HLD - well controlled on statins labs 2.25.22 LDL 47    4 dermatitis left leg  Recommend compression socks moisturizing lotion left leg       Plan:   1. I would recommend putting lotion on your leg that is itchy.    -you have to be careful you don't get a lotion that has a perfume that irritates your skin. 2. Current medications reviewed. No changes at this time. Refills given as warranted. 3. No cardiac testing at this time. 4. I would recommend getting diabetic socks with mild compression at [facility], [facility].   -they are easy to put on and take off.     -Wear during the day   -you want the ones that go to your knees   -you do not have to buy the expensive one and they cost about $10 a pair   -this will help with your swelling  5. I would recommend following up with your pcp for the yellow and green sputum you are coughing up. Follow up with me in 5 months    This note is scribed in the presence of Dr. Fabiola Boone MD by Arslan Flores RN.  I, Dr. Veronica Richards, personally performed the services described in this documentation, as scribed by the above signed scribe in my presence. It is both accurate and complete to my knowledge. I agree with the details independently gathered by the clinical support staff, while the remaining scribed note accurately describes my personal service to the patient. QUALITY MEASURES  1. Tobacco Cessation Counseling: NA  2. Retake of BP if >140/90:   NA  3. Documentation to PCP/referring for new patient:  Sent to PCP at close of office visit  4. CAD patient on anti-platelet: anticoagulated  5. CAD patient on STATIN therapy:  Yes  6.  Patient with CHF and aFib on anticoagulation:  Yes         Fabiola Boone MD

## 2022-03-07 ENCOUNTER — OFFICE VISIT (OUTPATIENT)
Dept: CARDIOLOGY CLINIC | Age: 87
End: 2022-03-07
Payer: MEDICARE

## 2022-03-07 VITALS
BODY MASS INDEX: 27.98 KG/M2 | SYSTOLIC BLOOD PRESSURE: 110 MMHG | HEART RATE: 65 BPM | WEIGHT: 148.2 LBS | OXYGEN SATURATION: 94 % | HEIGHT: 61 IN | DIASTOLIC BLOOD PRESSURE: 62 MMHG

## 2022-03-07 DIAGNOSIS — I25.10 ATHEROSCLEROSIS OF NATIVE CORONARY ARTERY OF NATIVE HEART WITHOUT ANGINA PECTORIS: ICD-10-CM

## 2022-03-07 DIAGNOSIS — E78.00 PURE HYPERCHOLESTEROLEMIA: ICD-10-CM

## 2022-03-07 DIAGNOSIS — I48.20 CHRONIC ATRIAL FIBRILLATION (HCC): Primary | ICD-10-CM

## 2022-03-07 DIAGNOSIS — I87.2 ACUTE STASIS DERMATITIS OF LEFT LOWER EXTREMITY: ICD-10-CM

## 2022-03-07 PROCEDURE — 99214 OFFICE O/P EST MOD 30 MIN: CPT | Performed by: INTERNAL MEDICINE

## 2022-03-07 RX ORDER — DIGOXIN 125 MCG
TABLET ORAL
Qty: 30 TABLET | Refills: 11 | Status: SHIPPED | OUTPATIENT
Start: 2022-03-07 | End: 2022-08-08

## 2022-03-07 NOTE — PATIENT INSTRUCTIONS
Plan:   1. I would recommend putting lotion on your leg that is itchy.    -you have to be careful you don't get a lotion that has a perfume that irritates your skin. 2. Current medications reviewed. No changes at this time. Refills given as warranted. 3. No cardiac testing at this time. 4. I would recommend getting diabetic socks with mild compression at [facility], [facility].   -they are easy to put on and take off.     -Wear during the day   -you want the ones that go to your knees   -you do not have to buy the expensive one and they cost about $10 a pair   -this will help with your swelling  5. I would recommend following up with your pcp for the yellow and green sputum you are coughing up.     Follow up with me in 5 months

## 2022-03-17 ENCOUNTER — OFFICE VISIT (OUTPATIENT)
Dept: FAMILY MEDICINE CLINIC | Age: 87
End: 2022-03-17
Payer: MEDICARE

## 2022-03-17 VITALS
OXYGEN SATURATION: 93 % | TEMPERATURE: 97.9 F | HEART RATE: 82 BPM | BODY MASS INDEX: 27.26 KG/M2 | WEIGHT: 144.25 LBS | DIASTOLIC BLOOD PRESSURE: 77 MMHG | SYSTOLIC BLOOD PRESSURE: 122 MMHG

## 2022-03-17 DIAGNOSIS — L98.491 SKIN ULCER, LIMITED TO BREAKDOWN OF SKIN (HCC): ICD-10-CM

## 2022-03-17 DIAGNOSIS — I25.10 ATHEROSCLEROSIS OF NATIVE CORONARY ARTERY OF NATIVE HEART WITHOUT ANGINA PECTORIS: Primary | ICD-10-CM

## 2022-03-17 DIAGNOSIS — J43.1 PANLOBULAR EMPHYSEMA (HCC): ICD-10-CM

## 2022-03-17 DIAGNOSIS — E78.00 PURE HYPERCHOLESTEROLEMIA: ICD-10-CM

## 2022-03-17 DIAGNOSIS — F02.80 LATE ONSET ALZHEIMER'S DISEASE WITHOUT BEHAVIORAL DISTURBANCE (HCC): ICD-10-CM

## 2022-03-17 DIAGNOSIS — M15.9 PRIMARY OSTEOARTHRITIS INVOLVING MULTIPLE JOINTS: ICD-10-CM

## 2022-03-17 DIAGNOSIS — I48.20 CHRONIC ATRIAL FIBRILLATION (HCC): ICD-10-CM

## 2022-03-17 DIAGNOSIS — K21.9 GERD WITHOUT ESOPHAGITIS: ICD-10-CM

## 2022-03-17 DIAGNOSIS — R32 URINARY INCONTINENCE, UNSPECIFIED TYPE: ICD-10-CM

## 2022-03-17 DIAGNOSIS — G30.1 LATE ONSET ALZHEIMER'S DISEASE WITHOUT BEHAVIORAL DISTURBANCE (HCC): ICD-10-CM

## 2022-03-17 PROCEDURE — 99214 OFFICE O/P EST MOD 30 MIN: CPT | Performed by: NURSE PRACTITIONER

## 2022-03-17 RX ORDER — AZITHROMYCIN 250 MG/1
250 TABLET, FILM COATED ORAL SEE ADMIN INSTRUCTIONS
Qty: 6 TABLET | Refills: 0 | Status: SHIPPED | OUTPATIENT
Start: 2022-03-17 | End: 2022-03-22

## 2022-03-17 ASSESSMENT — ENCOUNTER SYMPTOMS
CHEST TIGHTNESS: 0
ABDOMINAL PAIN: 0
WHEEZING: 0
NAUSEA: 0
COUGH: 1
SHORTNESS OF BREATH: 0
VOMITING: 0
SORE THROAT: 0
RHINORRHEA: 0
DIARRHEA: 0

## 2022-03-17 NOTE — PATIENT INSTRUCTIONS
Please read the healthy family handout that you were given and share it with your family. Please compare this printed medication list with your medications at home to be sure they are the same. If you have any medications that are different please contact us immediately at [phone]. Also review your allergies that we have listed, these may also include medications that you have not been able to tolerate, make sure everything listed is correct. If you have any allergies that are different please contact us immediately at [phone].

## 2022-03-17 NOTE — PROGRESS NOTES
CHIEF COMPLAINT  Chief Complaint   Patient presents with    Check-Up     GERD        HPI   German Grady is a 80 y.o. female who presents to the office for check up. Patient arrives today with her niece. Patient reports that she is doing well. Patient has recently seen cardiology. Patient denies any recent changes in medications. No episodes of dizziness, lightheadedness, chest pain or shortness of breath. No abdominal pain or discomfort. Patient's niece reports that she has had a thick productive cough lately. Cardiologist recommended her following up with PCP for possible antibiotics because of known emphysema. Patient reports thick yellow to green sputum at times. Patient reports it is worse when she eats/drinks dairy. No episodes of vomiting or diarrhea. Patient reports taking medications as prescribed. Patient has concerns with redness to left lower leg as well as toe fungus on her left foot. No other complaints, modifying factors or associated symptoms. Nursing notes reviewed.    Past Medical History:   Diagnosis Date    Atherosclerosis of native coronary artery of native heart without angina pectoris     cath 1/12 , 4/12 (occluded RCa with patent collateral flow    Chronic atrial fibrillation ([facility] 1/12    Closed left hip fracture (HCC) 1/16    THR    DDD (degenerative disc disease), cervical     Emphysema of lung ([facility] 2007    mild    GERD without esophagitis 2001    gerd with stricture    Hyperlipidemia     Osteopenia 8/11    recheck DEXA 2014    Primary osteoarthritis involving multiple joints     Routine health maintenance     TAC 9/99, DEXA 03    Urinary incontinence     urge     Past Surgical History:   Procedure Laterality Date    APPENDECTOMY      CATARACT REMOVAL      CHOLECYSTECTOMY      COLONOSCOPY  6/12    Dr Tejal Osborn, COLON, DIAGNOSTIC      HYSTERECTOMY      JOINT REPLACEMENT  01/2016    left hip    JOINT REPLACEMENT Left 09/23/2016    LEFT TOTAL Status: Not on file   Intimate Partner Violence:     Fear of Current or Ex-Partner: Not on file    Emotionally Abused: Not on file    Physically Abused: Not on file    Sexually Abused: Not on file   Housing Stability:     Unable to Pay for Housing in the Last Year: Not on file    Number of Places Lived in the Last Year: Not on file    Unstable Housing in the Last Year: Not on file     Current Outpatient Medications   Medication Sig Dispense Refill    azithromycin (ZITHROMAX) 250 MG tablet Take 1 tablet by mouth See Admin Instructions for 5 days 500mg on day 1 followed by 250mg on days 2 - 5 6 tablet 0    digoxin (LANOXIN) 125 MCG tablet TAKE 1/2 TABLET BY MOUTH EVERY DAY 30 tablet 11    magnesium oxide (MAG-OX) 400 (241.3 Mg) MG TABS tablet TAKE 1 TABLET BY MOUTH EVERY DAY 30 tablet 5    cimetidine (TAGAMET) 300 MG tablet TAKE 1 TABLET BY MOUTH TWO TIMES A DAY 60 tablet 5    24HR ALLERGY RELIEF 180 MG tablet TAKE 1 TABLET BY MOUTH EVERY DAY 30 tablet 1    oxybutynin (DITROPAN) 5 MG tablet TAKE 1 TABLET THREE TIMES A DAY AS NEEDED 90 tablet 1    rivaroxaban (XARELTO) 20 MG TABS tablet TAKE 1 TABLET BY MOUTH EVERY DAY 90 tablet 3    metoprolol tartrate (LOPRESSOR) 25 MG tablet TAKE 1 TABLET TWICE A  tablet 3    furosemide (LASIX) 20 MG tablet Take 1 tablet by mouth daily 90 tablet 3    pravastatin (PRAVACHOL) 20 MG tablet TAKE 1 TABLET ONCE DAILY 90 tablet 3    omeprazole (PRILOSEC) 20 MG delayed release capsule TAKE 1 CAPSULE BY MOUTH EVERY DAY 90 capsule 1    donepezil (ARICEPT) 5 MG tablet TAKE 1 TABLET BY MOUTH NIGHTLY 90 tablet 1    Lactobacillus (ACIDOPHILUS EXTRA STRENGTH PO) Take by mouth      Multiple Vitamins-Minerals (CENTRUM ADULTS PO) Take by mouth daily      Cholecalciferol (D3 VITAMIN PO) Take by mouth      famotidine (PEPCID) 20 MG tablet TAKE 1 TABLET TWICE A  tablet 0    nitroGLYCERIN (NITROSTAT) 0.4 MG SL tablet USE 1 TABLET UNDER TONGUE AS NEEDED FOR CHEST PAIN MAY REPEAT EVERY 5M IN. UP TO 3. THEN GO TO ER 25 tablet 0     No current facility-administered medications for this visit. Allergies   Allergen Reactions    Lipitor      pain    Morphine      confusion    Penicillins Hives    Sulfa Antibiotics Nausea And Vomiting       REVIEW OF SYSTEMS  Review of Systems   Constitutional: Negative for activity change, appetite change, chills and fever. HENT: Negative for congestion, rhinorrhea and sore throat. Eyes: Negative for visual disturbance. Respiratory: Positive for cough. Negative for chest tightness, shortness of breath and wheezing. Cardiovascular: Positive for leg swelling. Negative for chest pain and palpitations. Gastrointestinal: Negative for abdominal pain, diarrhea, nausea and vomiting. Genitourinary: Negative for dysuria, frequency, hematuria and urgency. Musculoskeletal: Negative for gait problem and myalgias. Skin: Positive for wound. Negative for rash. Skin ulcer posterior aspect of left 3rd toe   Neurological: Negative for dizziness, weakness, light-headedness, numbness and headaches. Psychiatric/Behavioral: Negative for self-injury and sleep disturbance. The patient is not nervous/anxious. PHYSICAL EXAM  /77   Pulse 82   Temp 97.9 °F (36.6 °C) (Oral)   Wt 144 lb 4 oz (65.4 kg)   SpO2 93%   BMI 27.26 kg/m²   Physical Exam  Vitals reviewed. Constitutional:       General: She is not in acute distress. Appearance: Normal appearance. She is well-developed. She is not diaphoretic. HENT:      Head: Normocephalic and atraumatic. Right Ear: Tympanic membrane normal.      Left Ear: Tympanic membrane normal.      Nose: Nose normal.      Mouth/Throat:      Mouth: Mucous membranes are moist.      Pharynx: Oropharynx is clear. No oropharyngeal exudate or posterior oropharyngeal erythema. Eyes:      General:         Right eye: No discharge. Left eye: No discharge.       Pupils: Pupils are equal, round, and reactive to light. Neck:      Vascular: No JVD. Cardiovascular:      Rate and Rhythm: Normal rate. Rhythm irregular. Pulses: Normal pulses. Pulmonary:      Effort: Pulmonary effort is normal. No respiratory distress. Breath sounds: No stridor. No wheezing, rhonchi or rales. Chest:      Chest wall: No tenderness. Abdominal:      General: Bowel sounds are normal. There is no distension. Palpations: Abdomen is soft. Tenderness: There is no abdominal tenderness. There is no guarding. Musculoskeletal:         General: No swelling or deformity. Normal range of motion. Cervical back: Normal range of motion and neck supple. Skin:     General: Skin is warm and dry. Capillary Refill: Capillary refill takes less than 2 seconds. Comments: See image   Neurological:      Mental Status: She is alert and oriented to person, place, and time. Psychiatric:         Attention and Perception: Attention normal.         Mood and Affect: Mood normal.         Speech: Speech normal.         Behavior: Behavior normal.         Thought Content: Thought content normal.                   ASSESSMENT/PLAN:   1. Atherosclerosis of native coronary artery of native heart without angina pectoris  Stable. Managed by cardiologist.  Patient reports seeing them on a regular basis. No recent changes in medications. Patient compliant with digoxin, Xarelto, metoprolol, furosemide, pravastatin, daily and nitro as needed. Continue with current treatment management follow-up in 6 months, sooner for new or worsening symptoms. 2. 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 Patient currently on Aricept 5 mg daily. No adverse side effects or missed doses. No new or worsening symptoms. Continue with current treatment management follow-up in 6 months, sooner for new or worsening symptoms. 3. Panlobular emphysema (HCC)  Stable. No recent exacerbations.   Patient denies any dyspnea upon exertion or rest.  Patient reports that she has had a thick productive sputum with her cough lately. Patient reports green to yellow at times. Patient reports that she recently seen cardiologist and recommended her to follow-up with PCP for possible antibiotics. Patient does contribute sick sputum to dairy products. We did discuss the importance of drinking plenty of water. Patient will be given azithromycin at this time with strict return precautions. Patient and niece verbalized acknowledged with plan of care at this time. - azithromycin (ZITHROMAX) 250 MG tablet; Take 1 tablet by mouth See Admin Instructions for 5 days 500mg on day 1 followed by 250mg on days 2 - 5  Dispense: 6 tablet; Refill: 0    4. GERD without esophagitis  Stable. Patient compliant with tagament and omeprazole.  No breakthrough symptoms.  Patient avoids foods that trigger her symptoms.  No recent dysphasia, cough, nausea, or indigestion.  Continue current therapy and management follow-up in 6 months, sooner for new or worsening symptoms. 5. Primary osteoarthritis involving multiple joints  Stable. No new or worsening symptoms. Patient compliant with vitamin D supplement. Continue with current treatment management follow-up in 6 months, sooner for new or worsening symptoms. 6. Pure hypercholesterolemia  Stable. Patient compliant with pravastatin 20 mg daily. Continue with current treatment management follow-up in 6 months, sooner for new or worsening symptoms. 7. Urinary incontinence, unspecified type  Stable. Patient denies any increase in episodes. Patient compliant with Ditropan 5 mg 3 times a day. Continue with current treatment management follow-up in 6 months, sooner for new or worsening symptoms. 8. Chronic atrial fibrillation  See above #1    9. Skin ulcer, limited to breakdown of skin [facility])  Patient presents with concerns of fungal infection in her left foot.   Patient has ongoing fungal disease in her toenails. Patient reports that she has been trimming her toenails herself. We did discuss in detail the importance of seeing podiatry for nail care. Patient has ulceration noted to posterior aspect of left third toe. Patient reports that she thought it was the fungus going through her toenail. Patient reports keeping area clean and bandaged. I did recommend that patient perform warm Epson salt soaks. No streaking, sloughing or areas of abscess noted. Patient given a referral to Dr. Carlos Jean and instructions on following up for further care. Patient and family both verbalized and acknowledged with plan of care at this time. - External Referral To Podiatry         The note was completed using Dragon voice recognition transcription. Every effort was made to ensure accuracy; however, inadvertent  transcription errors may be present despite my best efforts to edit errors.     Carmelina Story, TYLER - CNP

## 2022-03-25 ENCOUNTER — CARE COORDINATION (OUTPATIENT)
Dept: FAMILY MEDICINE CLINIC | Age: 87
End: 2022-03-25

## 2022-03-31 DIAGNOSIS — R05.9 COUGH: ICD-10-CM

## 2022-03-31 DIAGNOSIS — R09.89 CHEST CONGESTION: ICD-10-CM

## 2022-03-31 RX ORDER — OMEPRAZOLE 20 MG/1
CAPSULE, DELAYED RELEASE ORAL
Qty: 90 CAPSULE | Refills: 1 | Status: SHIPPED | OUTPATIENT
Start: 2022-03-31 | End: 2022-09-27

## 2022-03-31 RX ORDER — FEXOFENADINE HYDROCHLORIDE 180 MG/1
TABLET ORAL
Qty: 30 TABLET | Refills: 1 | Status: SHIPPED | OUTPATIENT
Start: 2022-03-31 | End: 2022-05-31

## 2022-03-31 RX ORDER — OXYBUTYNIN CHLORIDE 5 MG/1
TABLET ORAL
Qty: 90 TABLET | Refills: 1 | Status: SHIPPED | OUTPATIENT
Start: 2022-03-31 | End: 2022-05-31

## 2022-03-31 RX ORDER — DONEPEZIL HYDROCHLORIDE 5 MG/1
5 TABLET, FILM COATED ORAL NIGHTLY
Qty: 90 TABLET | Refills: 1 | Status: SHIPPED | OUTPATIENT
Start: 2022-03-31 | End: 2022-09-27

## 2022-03-31 NOTE — TELEPHONE ENCOUNTER
Future appt scheduled 09/19/2022              Last appt 03/17/2022    Last Written     oxybutynin (DITROPAN) 5 MG tablet  01/20/2022  #90  1 RF     omeprazole (PRILOSEC) 20 MG delayed release capsule  09/17/2021  #90  1 RF     donepezil (ARICEPT) 5 MG tablet  09/17/2021  #90  1 RF     24HR ALLERGY RELIEF 180 MG tablet  01/20/2022  #30  1 RF     Pharmacy called and stated they are making bubble packs and requesting this done some time today PLEASE!!!

## 2022-04-11 ENCOUNTER — TELEPHONE (OUTPATIENT)
Dept: FAMILY MEDICINE CLINIC | Age: 87
End: 2022-04-11

## 2022-04-11 RX ORDER — DOXYCYCLINE HYCLATE 100 MG
100 TABLET ORAL 2 TIMES DAILY
Qty: 20 TABLET | Refills: 0 | Status: SHIPPED | OUTPATIENT
Start: 2022-04-11 | End: 2022-04-21

## 2022-04-11 RX ORDER — BENZONATATE 200 MG/1
200 CAPSULE ORAL 3 TIMES DAILY PRN
Qty: 21 CAPSULE | Refills: 0 | Status: SHIPPED | OUTPATIENT
Start: 2022-04-11

## 2022-04-11 NOTE — TELEPHONE ENCOUNTER
Okay to give doxycycline 100 mg twice daily for 10 days and Tessalon 200 mg 3 times daily as needed cough dispense 21

## 2022-04-11 NOTE — TELEPHONE ENCOUNTER
Patient was seen in the office on the 17th she was prescribed zpak for her cough. Family says she still has terrible cough and drainage is very dark in color, no other symptoms. Patient does have emphysema.   Family is wanting to see if more abx could be prescribed and maybe something for her cough

## 2022-05-31 DIAGNOSIS — R09.89 CHEST CONGESTION: ICD-10-CM

## 2022-05-31 DIAGNOSIS — R05.9 COUGH: ICD-10-CM

## 2022-05-31 RX ORDER — FEXOFENADINE HYDROCHLORIDE 180 MG/1
TABLET ORAL
Qty: 30 TABLET | Refills: 3 | Status: SHIPPED | OUTPATIENT
Start: 2022-05-31 | End: 2022-09-27

## 2022-05-31 RX ORDER — OXYBUTYNIN CHLORIDE 5 MG/1
TABLET ORAL
Qty: 90 TABLET | Refills: 3 | Status: SHIPPED | OUTPATIENT
Start: 2022-05-31 | End: 2022-09-27

## 2022-05-31 NOTE — TELEPHONE ENCOUNTER
Future appt scheduled 9/19/22  Last appt 3/17/22  Refilled medication per verbal order from provider.

## 2022-08-01 DIAGNOSIS — I48.20 CHRONIC ATRIAL FIBRILLATION (HCC): ICD-10-CM

## 2022-08-02 NOTE — TELEPHONE ENCOUNTER
Last ov 3.7.22-Next ov 8.8.22  Assessment:            Encounter Diagnoses   Name Primary? Chronic atrial fibrillation Yes    Pure hypercholesterolemia      Atherosclerosis of native coronary artery of native heart without angina pectoris      Acute stasis dermatitis of left lower extremity           1. CAD (coronary artery disease) : stable   2. AF (atrial fibrillation) chronic persistent: Rate controlled and a   3. HLD - well controlled on statins labs 2.25.22 LDL 47     4 dermatitis left leg  Recommend compression socks moisturizing lotion left leg        Plan:   1. I would recommend putting lotion on your leg that is itchy.              -you have to be careful you don't get a lotion that has a perfume that irritates your skin. 2. Current medications reviewed. No changes at this time. Refills given as warranted. 3. No cardiac testing at this time. 4. I would recommend getting diabetic socks with mild compression at [facility].              -they are easy to put on and take off.                -Wear during the day              -you want the ones that go to your knees              -you do not have to buy the expensive one and they cost about $10 a pair              -this will help with your swelling  5. I would recommend following up with your pcp for the yellow and green sputum you are coughing up. Follow up with me in 5 months     This note is scribed in the presence of Dr. Madiha Merida MD by Jens Lee RN.  I, Dr. Marcela Omalley, personally performed the services described in this documentation, as scribed by the above signed scribe in my presence. It is both accurate and complete to my knowledge. I agree with the details independently gathered by the clinical support staff, while the remaining scribed note accurately describes my personal service to the patient.

## 2022-08-05 NOTE — PROGRESS NOTES
[facility]     Outpatient Follow Up Note    Subjective:  Hortencia Liao is here today for cardiology follow up of CAD, permanent AF, HLD, chest pain. C/o edema      [place]:  Today, her niece Gemma Bucio is with her. She reports she fell and hit her left hip about three weeks ago. She was working in her flower beds. She was squating and lost balance when tried to get up fell backwards, She had a large bruise on  back of her left leg. Gemma Bucio saw her a week and a half after she fell and got her started icing her leg which helped the swelling. She slept in her chair last night with her legs down. She had CP this morning and thinks it is from sleeping in the chair. Patient is vaccinated against Covid. Moderna 2/2     PMH:   stable CAD, chronic afib rate controlled anticoagulated on treatment with statins for hyperlipidemia. On 07/13/20, she reported recently doing work outside, [address] In the garden and weeding flowers. She developed CP took 2 nitro which relieved the pain. She had intermittent BLE edema she has been taking a diuretic  couple times a week which has helped with edema. Otherwise she reported doing ok.      12 point ROS negative in all areas as listed below except as in [address]  Constitutional, EENT, pulmonary, GI, , Musculoskeletal, skin, neurological, hematological, endocrine, Psychiatric    Past Medical History:   Diagnosis Date    Atherosclerosis of native coronary artery of native heart without angina pectoris     cath 1/12 , 4/12 (occluded RCa with patent collateral flow    Chronic atrial fibrillation ([address] 1/12    Closed left hip fracture ([address] 1/16    THR    DDD (degenerative disc disease), cervical     Emphysema of lung ([address] 2007    mild    GERD without esophagitis 2001    gerd with stricture    Hyperlipidemia     Osteopenia 8/11    recheck DEXA 2014    Primary osteoarthritis involving multiple joints     Routine health maintenance     TAC 9/99, DEXA 03    Urinary incontinence     urge Social History:    Social History     Tobacco Use   Smoking Status Former    Types: Cigarettes   Smokeless Tobacco Never   Tobacco Comments    quit in 1990   Family history non contributory  On sister living 2 yrs older apparently doing well in [place]  Current Medications:  Current Outpatient Medications   Medication Sig Dispense Refill    rivaroxaban (XARELTO) 20 MG TABS tablet TAKE 1 TABLET BY MOUTH EVERY DAY 90 tablet 3    oxybutynin (DITROPAN) 5 MG tablet TAKE 1 TABLET BY MOUTH THREE TIMES A DAY AS NEEDED 90 tablet 3    donepezil (ARICEPT) 5 MG tablet TAKE 1 TABLET BY MOUTH NIGHTLY 90 tablet 1    magnesium oxide (MAG-OX) 400 (241.3 Mg) MG TABS tablet TAKE 1 TABLET BY MOUTH EVERY DAY 30 tablet 5    cimetidine (TAGAMET) 300 MG tablet TAKE 1 TABLET BY MOUTH TWO TIMES A DAY 60 tablet 5    metoprolol tartrate (LOPRESSOR) 25 MG tablet TAKE 1 TABLET TWICE A  tablet 3    furosemide (LASIX) 20 MG tablet Take 1 tablet by mouth daily 90 tablet 3    pravastatin (PRAVACHOL) 20 MG tablet TAKE 1 TABLET ONCE DAILY 90 tablet 3    Multiple Vitamins-Minerals (CENTRUM ADULTS PO) Take by mouth daily      Cholecalciferol (D3 VITAMIN PO) Take by mouth      famotidine (PEPCID) 20 MG tablet TAKE 1 TABLET TWICE A  tablet 0    nitroGLYCERIN (NITROSTAT) 0.4 MG SL tablet USE 1 TABLET UNDER TONGUE AS NEEDED FOR CHEST PAIN MAY REPEAT EVERY 5M IN. UP TO 3. THEN GO TO ER 25 tablet 0    24HR ALLERGY RELIEF 180 MG tablet TAKE 1 TABLET BY MOUTH EVERY DAY 30 tablet 3    benzonatate (TESSALON) 200 MG capsule Take 1 capsule by mouth 3 times daily as needed for Cough (Patient not taking: Reported on 8/8/2022) 21 capsule 0    omeprazole (PRILOSEC) 20 MG delayed release capsule TAKE 1 CAPSULE BY MOUTH EVERY DAY 90 capsule 1    Lactobacillus (ACIDOPHILUS EXTRA STRENGTH PO) Take by mouth (Patient not taking: Reported on 8/8/2022)       No current facility-administered medications for this visit.      REVIEW OF SYSTEMS:    12 point ROS negative in all areas as listed below except as in [address]  Constitutional, EENT, pulmonary, GI, , Musculoskeletal, skin, neurological, hematological, endocrine, Psychiatric      Objective:   PHYSICAL EXAM:        VITALS:    BP (!) 138/51   Pulse 53   Ht 5' 2\" (1.575 m)   Wt 146 lb 6.4 oz (66.4 kg)   SpO2 98%   BMI 26.78 kg/m²    No orthostatic hypotension. Wt Readings from Last 3 Encounters:   08/08/22 146 lb 6.4 oz (66.4 kg)   03/17/22 144 lb 4 oz (65.4 kg)   03/07/22 148 lb 3.2 oz (67.2 kg)     CONSTITUTIONAL: Cooperative, no apparent distress, and appears well nourished / developed  NEUROLOGIC:  Awake and oriented to person, place and time. PSYCH: Calm affect. SKIN: Warm and dry cracked in left lower leg. HEENT: Sclera non-icteric, normocephalic, neck supple, no elevation of JVP, normal carotid pulses with no bruits and thyroid normal size. LUNGS: clear lung sounds, h/o bronchitis No increased work of breathing, no wheezing  CARDIOVASCULAR: Irregularly irregular rhythm 55-60 bpm with no murmurs, gallops, rubs, or abnormal heart sounds, normal PMI. The apical impulses not displaced  JVP less than 8 cm H2O  The carotid upstroke is normal in amplitude and contour without delay or bruit  JVP is not elevated  EXT: 3+ edma bilaterally, no calf tenderness. Pulses are present bilaterally.  No knee pain or swelling     DATA:    Lab Results   Component Value Date    ALT 13 02/25/2022    AST 20 02/25/2022    ALKPHOS 53 02/25/2022    BILITOT 0.3 02/25/2022     Lab Results   Component Value Date    CREATININE 0.9 02/25/2022    BUN 13 02/25/2022     02/25/2022    K 4.4 02/25/2022     02/25/2022    CO2 28 02/25/2022     Lab Results   Component Value Date    TSH 2.03 09/10/2012     Lab Results   Component Value Date    WBC 5.1 02/25/2022    HGB 12.4 02/25/2022    HCT 37.2 02/25/2022    MCV 87.9 02/25/2022     02/25/2022     No components found for: CHLPL  Lab Results   Component Value Date    TRIG 61 06/28/2019    TRIG 85 03/01/2018    TRIG 65 02/16/2017     Lab Results   Component Value Date    HDL 58 02/25/2022    HDL 61 (H) 03/09/2021    HDL 66 (H) 06/28/2019     Lab Results   Component Value Date    LDLCALC 47 02/25/2022    LDLCALC 72 03/09/2021    LDLCALC 40 06/28/2019     Lab Results   Component Value Date    LABVLDL 17 02/25/2022    LABVLDL 18 03/09/2021    LABVLDL 12 06/28/2019     Lab Results   Component Value Date    DIGOXIN <0.3 (L) 02/25/2022     Imaging:  Labs from march 2021 reviewed with the patient  EKG 2/24/20   Atrial fibrillation 64 bpm  -  Nonspecific T-abnormality. CT head 1/11/19  No evidence of acute intracranial abnormality. EKG 11/13/18  Atrial fibrillationAbnormal ECGWhen compared with ECG of 07-JAN-2016 08:46,No significant change was foundConfirmed by [facility] TALON DAVIS, Kianna Whitley ([id number]) on 11/13/2018 11:05:05 AM    CXR 11/13/18  Possible left upper lobe pulmonary nodule. Routine chest CT is recommended for further evaluation. No acute disease. 8/25/16 EKG  A Fib - HR 94    CATH 4/24/12  1. Remote, non-dominant right coronary artery occlusion with   collateralization. 2. No significant disease along the left coronary artery. 3. Normal left ventricular systolic function. EF 55%  4. No evidence for aortic dissection. Cath 1/2012. [facility] RCA small nondominant 100%. Nonobstructive LAD/LCx.     ECHO from 4/12 showed Normal LV systolic function. EF 55%. Mild aortic valve sclerosis. Biatrial enlargement. Moderate TR with mildly elevated pulmonary pressure. Mild MR. 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     Atherosclerosis of native coronary artery of native heart without angina pectoris     Permanent atrial fibrillation (HCC) Yes    Pure hypercholesterolemia     Edema of both lower legs      Currently no angina continue metoprolol  Afib rate low normal will stop lanoxin continue metoprolol  She is

## 2022-08-08 ENCOUNTER — OFFICE VISIT (OUTPATIENT)
Dept: CARDIOLOGY CLINIC | Age: 87
End: 2022-08-08
Payer: MEDICARE

## 2022-08-08 VITALS
HEIGHT: 62 IN | BODY MASS INDEX: 26.94 KG/M2 | WEIGHT: 146.4 LBS | SYSTOLIC BLOOD PRESSURE: 138 MMHG | OXYGEN SATURATION: 98 % | DIASTOLIC BLOOD PRESSURE: 51 MMHG | HEART RATE: 53 BPM

## 2022-08-08 DIAGNOSIS — I25.10 ATHEROSCLEROSIS OF NATIVE CORONARY ARTERY OF NATIVE HEART WITHOUT ANGINA PECTORIS: ICD-10-CM

## 2022-08-08 DIAGNOSIS — I48.21 PERMANENT ATRIAL FIBRILLATION (HCC): Primary | ICD-10-CM

## 2022-08-08 DIAGNOSIS — E78.00 PURE HYPERCHOLESTEROLEMIA: ICD-10-CM

## 2022-08-08 DIAGNOSIS — R60.0 EDEMA OF BOTH LOWER LEGS: ICD-10-CM

## 2022-08-08 PROCEDURE — 99214 OFFICE O/P EST MOD 30 MIN: CPT | Performed by: INTERNAL MEDICINE

## 2022-08-08 PROCEDURE — 1123F ACP DISCUSS/DSCN MKR DOCD: CPT | Performed by: INTERNAL MEDICINE

## 2022-08-08 NOTE — PATIENT INSTRUCTIONS
Plan:   1. Blood work reviewed from 2/2022  2. Current medications reviewed. Refills given as warranted. 3. Stop taking Digoxin because your heart rate is slow today.   -this is the half pill that you take every other day  4. We will repeat blood work after your next appointment  5. You have to be careful.   I am worried about you falling.   -try sitting on a stool instead of squatting    Follow up with me in 4 months

## 2022-08-26 DIAGNOSIS — R25.2 MUSCLE CRAMPS: ICD-10-CM

## 2022-08-26 RX ORDER — CIMETIDINE 300 MG/1
TABLET, FILM COATED ORAL
Qty: 180 TABLET | Refills: 5 | Status: SHIPPED | OUTPATIENT
Start: 2022-08-26

## 2022-08-26 RX ORDER — LANOLIN ALCOHOL/MO/W.PET/CERES
CREAM (GRAM) TOPICAL
Qty: 30 TABLET | Refills: 5 | Status: SHIPPED | OUTPATIENT
Start: 2022-08-26

## 2022-08-26 NOTE — TELEPHONE ENCOUNTER
Refill request for CIMETIDINE 300 MG TABS 300 Tablet, MAGNESIUM OXIDE 400 (241.3 400 (240 MG Tablet medication.      Name of [address] [place] [address]       Last visit - 3-     Pending visit - 9-    Last refill - 5-      Medication Contract signed -   Last Bryce Recio ran-         Additional Comments

## 2022-09-19 ENCOUNTER — OFFICE VISIT (OUTPATIENT)
Dept: FAMILY MEDICINE CLINIC | Age: 87
End: 2022-09-19
Payer: MEDICARE

## 2022-09-19 VITALS
DIASTOLIC BLOOD PRESSURE: 65 MMHG | SYSTOLIC BLOOD PRESSURE: 105 MMHG | WEIGHT: 146 LBS | BODY MASS INDEX: 26.7 KG/M2 | TEMPERATURE: 98 F | HEART RATE: 65 BPM | OXYGEN SATURATION: 93 %

## 2022-09-19 DIAGNOSIS — M25.561 CHRONIC PAIN OF RIGHT KNEE: ICD-10-CM

## 2022-09-19 DIAGNOSIS — N39.3 STRESS INCONTINENCE OF URINE: ICD-10-CM

## 2022-09-19 DIAGNOSIS — G89.29 CHRONIC PAIN OF RIGHT KNEE: ICD-10-CM

## 2022-09-19 DIAGNOSIS — E78.00 PURE HYPERCHOLESTEROLEMIA: ICD-10-CM

## 2022-09-19 DIAGNOSIS — J43.1 PANLOBULAR EMPHYSEMA (HCC): ICD-10-CM

## 2022-09-19 DIAGNOSIS — M15.9 PRIMARY OSTEOARTHRITIS INVOLVING MULTIPLE JOINTS: ICD-10-CM

## 2022-09-19 DIAGNOSIS — K21.9 GERD WITHOUT ESOPHAGITIS: ICD-10-CM

## 2022-09-19 DIAGNOSIS — L98.491 SKIN ULCER, LIMITED TO BREAKDOWN OF SKIN (HCC): ICD-10-CM

## 2022-09-19 DIAGNOSIS — I25.10 ATHEROSCLEROSIS OF NATIVE CORONARY ARTERY OF NATIVE HEART WITHOUT ANGINA PECTORIS: Primary | ICD-10-CM

## 2022-09-19 DIAGNOSIS — F02.80 LATE ONSET ALZHEIMER'S DISEASE WITHOUT BEHAVIORAL DISTURBANCE (HCC): ICD-10-CM

## 2022-09-19 DIAGNOSIS — G30.1 LATE ONSET ALZHEIMER'S DISEASE WITHOUT BEHAVIORAL DISTURBANCE (HCC): ICD-10-CM

## 2022-09-19 DIAGNOSIS — Z23 NEED FOR INFLUENZA VACCINATION: ICD-10-CM

## 2022-09-19 DIAGNOSIS — I48.20 CHRONIC ATRIAL FIBRILLATION (HCC): ICD-10-CM

## 2022-09-19 PROCEDURE — G0008 ADMIN INFLUENZA VIRUS VAC: HCPCS | Performed by: NURSE PRACTITIONER

## 2022-09-19 PROCEDURE — 1123F ACP DISCUSS/DSCN MKR DOCD: CPT | Performed by: NURSE PRACTITIONER

## 2022-09-19 PROCEDURE — 99214 OFFICE O/P EST MOD 30 MIN: CPT | Performed by: NURSE PRACTITIONER

## 2022-09-19 PROCEDURE — 90694 VACC AIIV4 NO PRSRV 0.5ML IM: CPT | Performed by: NURSE PRACTITIONER

## 2022-09-19 ASSESSMENT — ENCOUNTER SYMPTOMS
WHEEZING: 0
RHINORRHEA: 0
CHEST TIGHTNESS: 0
DIARRHEA: 0
SHORTNESS OF BREATH: 0
COUGH: 0
SORE THROAT: 0
ABDOMINAL PAIN: 0
NAUSEA: 0
VOMITING: 0

## 2022-09-19 ASSESSMENT — PATIENT HEALTH QUESTIONNAIRE - PHQ9
SUM OF ALL RESPONSES TO PHQ QUESTIONS 1-9: 0
2. FEELING DOWN, DEPRESSED OR HOPELESS: 0
SUM OF ALL RESPONSES TO PHQ QUESTIONS 1-9: 0
SUM OF ALL RESPONSES TO PHQ9 QUESTIONS 1 & 2: 0
SUM OF ALL RESPONSES TO PHQ QUESTIONS 1-9: 0
SUM OF ALL RESPONSES TO PHQ QUESTIONS 1-9: 0
1. LITTLE INTEREST OR PLEASURE IN DOING THINGS: 0

## 2022-09-19 NOTE — PROGRESS NOTES
CHIEF COMPLAINT  Chief Complaint   Patient presents with    Check-Up          HPI   Lorenzo Amaya is a 80 y.o. female who presents to the office for 6-month follow-up. Patient reports that she is doing well. Patient has recently seen cardiology. Patient denies any recent changes in medications. No episodes of dizziness, lightheadedness, chest pain or shortness of breath. No abdominal pain or discomfort. Patient reports eating and drinking appropriately and taking medications as prescribed. Is accompanied by her niece today. Patient has complaints of right knee pain. Patient reports being told in the past that she needed replacement did not want to have it replaced. Patient follows with orthopedics currently. Patient also has ongoing concerns with her toe and toenail fungus on her left foot. No other complaints, modifying factors or associated symptoms. Nursing notes reviewed.    Past Medical History:   Diagnosis Date    Atherosclerosis of native coronary artery of native heart without angina pectoris     cath 1/12 , 4/12 (occluded RCa with patent collateral flow    Chronic atrial fibrillation (HCC) 1/12    Closed left hip fracture ([facility] 1/16    THR    DDD (degenerative disc disease), cervical     Emphysema of lung ([address] 2007    mild    GERD without esophagitis 2001    gerd with stricture    Hyperlipidemia     Osteopenia 8/11    recheck DEXA 2014    Primary osteoarthritis involving multiple joints     Routine health maintenance     TAC 9/99, DEXA 03    Urinary incontinence     urge     Past Surgical History:   Procedure Laterality Date    APPENDECTOMY      CATARACT REMOVAL      CHOLECYSTECTOMY      COLONOSCOPY  6/12    Dr Alessandra Iraheta, COLON, DIAGNOSTIC      HYSTERECTOMY (CERVIX STATUS UNKNOWN)      JOINT REPLACEMENT  01/2016    left hip    JOINT REPLACEMENT Left 09/23/2016    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     Family History   Problem Relation Age of Onset    Arthritis Mother     Cancer Mother     Heart Disease Mother     High Blood Pressure Mother     Arthritis Father     Early Death Father     Heart Disease Father     Substance Abuse Father     Arthritis Sister     High Blood Pressure Sister     Arthritis Paternal Grandmother      Social History     Socioeconomic History    Marital status:       Spouse name: Not on file    Number of children: Not on file    Years of education: Not on file    Highest education level: Not on file   Occupational History    Not on file   Tobacco Use    Smoking status: Former     Types: Cigarettes    Smokeless tobacco: Never    Tobacco comments:     quit in 1990   Vaping Use    Vaping Use: Never used   Substance and Sexual Activity    Alcohol use: No     Alcohol/week: 0.0 standard drinks    Drug use: No    Sexual activity: Not Currently   Other Topics Concern    Not on file   Social History Narrative    ** Merged History Encounter **          Social Determinants of Health     Financial Resource Strain: Not on file   Food Insecurity: Not on file   Transportation Needs: Not on file   Physical Activity: Not on file   Stress: Not on file   Social Connections: Not on file   Intimate Partner Violence: Not on file   Housing Stability: Not on file     Current Outpatient Medications   Medication Sig Dispense Refill    cimetidine (TAGAMET) 300 MG tablet TAKE 1 TABLET BY MOUTH TWO TIMES A  tablet 5    magnesium oxide (MAG-OX) 400 (240 Mg) MG tablet TAKE 1 TABLET BY MOUTH EVERY DAY 30 tablet 5    rivaroxaban (XARELTO) 20 MG TABS tablet TAKE 1 TABLET BY MOUTH EVERY DAY 90 tablet 3    oxybutynin (DITROPAN) 5 MG tablet TAKE 1 TABLET BY MOUTH THREE TIMES A DAY AS NEEDED 90 tablet 3    24HR ALLERGY RELIEF 180 MG tablet TAKE 1 TABLET BY MOUTH EVERY DAY 30 tablet 3    donepezil (ARICEPT) 5 MG tablet TAKE 1 TABLET BY MOUTH NIGHTLY 90 tablet 1    omeprazole (PRILOSEC) 20 MG delayed release capsule TAKE 1 CAPSULE BY MOUTH EVERY DAY 90 capsule 1    metoprolol tartrate (LOPRESSOR) 25 MG tablet TAKE 1 TABLET TWICE A  tablet 3    furosemide (LASIX) 20 MG tablet Take 1 tablet by mouth daily 90 tablet 3    pravastatin (PRAVACHOL) 20 MG tablet TAKE 1 TABLET ONCE DAILY 90 tablet 3    Multiple Vitamins-Minerals (CENTRUM ADULTS PO) Take by mouth daily      Cholecalciferol (D3 VITAMIN PO) Take by mouth      famotidine (PEPCID) 20 MG tablet TAKE 1 TABLET TWICE A  tablet 0    nitroGLYCERIN (NITROSTAT) 0.4 MG SL tablet USE 1 TABLET UNDER TONGUE AS NEEDED FOR CHEST PAIN MAY REPEAT EVERY 5M IN. UP TO 3. THEN GO TO ER 25 tablet 0    benzonatate (TESSALON) 200 MG capsule Take 1 capsule by mouth 3 times daily as needed for Cough (Patient not taking: No sig reported) 21 capsule 0    Lactobacillus (ACIDOPHILUS EXTRA STRENGTH PO) Take by mouth (Patient not taking: No sig reported)       No current facility-administered medications for this visit. Allergies   Allergen Reactions    Lipitor      pain    Morphine      confusion    Penicillins Hives    Sulfa Antibiotics Nausea And Vomiting       REVIEW OF SYSTEMS  Review of Systems   Constitutional:  Negative for activity change, appetite change, chills and fever. HENT:  Negative for congestion, rhinorrhea and sore throat. Eyes:  Negative for visual disturbance. Respiratory:  Negative for cough, chest tightness, shortness of breath and wheezing. Cardiovascular:  Negative for chest pain, palpitations and leg swelling. Gastrointestinal:  Negative for abdominal pain, diarrhea, nausea and vomiting. Genitourinary:  Negative for dysuria, frequency, hematuria and urgency. Musculoskeletal:  Positive for arthralgias. Negative for gait problem and myalgias. Skin:  Positive for wound. Negative for rash. Skin ulcer posterior aspect of left 3rd toe   Neurological:  Negative for dizziness, weakness, light-headedness, numbness and headaches. Psychiatric/Behavioral:  Negative for self-injury and sleep disturbance.  The patient is not nervous/anxious. PHYSICAL EXAM  /65   Pulse 65   Temp 98 °F (36.7 °C) (Oral)   Wt 146 lb (66.2 kg)   SpO2 93%   BMI 26.70 kg/m²   Physical Exam  Vitals reviewed. Constitutional:       General: She is not in acute distress. Appearance: Normal appearance. She is well-developed. She is not diaphoretic. HENT:      Head: Normocephalic and atraumatic. Right Ear: Tympanic membrane normal.      Left Ear: Tympanic membrane normal.      Nose: Nose normal.      Mouth/Throat:      Mouth: Mucous membranes are moist.      Pharynx: Oropharynx is clear. No oropharyngeal exudate or posterior oropharyngeal erythema. Eyes:      General:         Right eye: No discharge. Left eye: No discharge. Pupils: Pupils are equal, round, and reactive to light. Neck:      Vascular: No JVD. Cardiovascular:      Rate and Rhythm: Normal rate. Rhythm irregular. Pulses: Normal pulses. Dorsalis pedis pulses are 2+ on the left side. Posterior tibial pulses are 2+ on the left side. Pulmonary:      Effort: Pulmonary effort is normal. No respiratory distress. Breath sounds: No stridor. No wheezing, rhonchi or rales. Chest:      Chest wall: No tenderness. Abdominal:      General: Bowel sounds are normal. There is no distension. Palpations: Abdomen is soft. Tenderness: There is no abdominal tenderness. There is no guarding. Musculoskeletal:         General: No swelling or deformity. Normal range of motion. Cervical back: Normal range of motion and neck supple. Feet:    Feet:      Left foot:      Toenail Condition: Fungal disease present. Comments: Ulceration noted to left third toe plantar aspect. No areas of fluctuance. Area is very callused over. Skin:     General: Skin is warm and dry. Capillary Refill: Capillary refill takes less than 2 seconds. Coloration: Skin is not pale. Findings: No bruising, lesion or rash. Neurological:      Mental Status: She is alert and oriented to person, place, and time. Psychiatric:         Attention and Perception: Attention normal.         Mood and Affect: Mood normal.         Speech: Speech normal.         Behavior: Behavior normal.         Thought Content: Thought content normal.        ASSESSMENT/PLAN:   1. Atherosclerosis of native coronary artery of native heart without angina pectoris/Chronic atrial fibrillation  Stable. Managed by cardiologist.  Patient reports seeing them on a regular basis. No recent changes in medications. Patient compliant with digoxin, Xarelto, metoprolol, furosemide, pravastatin, daily and nitro as needed. Continue with current treatment management follow-up in 6 months, sooner for new or worsening symptoms. 2. GERD without esophagitis  Stable. Patient compliant with tagament and omeprazole. No breakthrough symptoms. Patient avoids foods that trigger her symptoms. No recent dysphasia, cough, nausea, or indigestion. Continue current therapy and management follow-up in 6 months, sooner for new or worsening symptoms. 3. 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 Patient currently on Aricept 5 mg daily. No adverse side effects or missed doses. No new or worsening symptoms. Continue with current treatment management follow-up in 6 months, sooner for new or worsening symptoms. 4. Primary osteoarthritis involving multiple joints  Stable. No new or worsening symptoms. Patient compliant with vitamin D supplement. Continue with current treatment management follow-up in 6 months, sooner for new or worsening symptoms. 5. Panlobular emphysema (HCC)  Stable. No recent exacerbations. Patient denies any dyspnea upon exertion or rest.  Continue current treatment management follow-up symptoms. 6. Pure hypercholesterolemia  Stable. Patient compliant with pravastatin 20 mg daily.   Continue with current treatment management follow-up in 6 months, sooner for new or worsening symptoms. 7. Stress incontinence of urine  Stable. Patient denies any increase in episodes. Patient compliant with Ditropan 5 mg 3 times a day. Continue with current treatment management follow-up in 6 months, sooner for new or worsening symptoms. 8.  Chronic knee pain, right  Patient reports pain in right knee. Patient reports its been ongoing for several years. Patient was told in the past that she needed to have bilateral knee replacements however she chose to only do the left knee. Patient reports following with orthopedic surgeon Dr. Mae Fay. Patient reports last injection approximately 3 months ago. I did recommend that patient contact orthopedics for further eval and treatment. Patient verbalized and acknowledges. 9.  Skin ulcer, limited to breakdown of skin  Stable. Patient continues to have ulceration to left third toe plantar aspect. Area does appear to be worse and more callused over. Patient continues to cut her own nails after being instructed on not doing so. Previously referral given to podiatrist however patient did not follow-up. Referral resent Yomi Byers and number given to patient and niece. Recommendations for them to call for further follow-up. 10.  Need for influenza vaccination  Preventative vaccination administered at today's visit. The note was completed using Dragon voice recognition transcription. Every effort was made to ensure accuracy; however, inadvertent  transcription errors may be present despite my best efforts to edit errors.     Serena Byers, APRN - CNP

## 2022-09-23 DIAGNOSIS — R09.89 CHEST CONGESTION: ICD-10-CM

## 2022-09-23 DIAGNOSIS — R05.9 COUGH: ICD-10-CM

## 2022-09-23 NOTE — TELEPHONE ENCOUNTER
Future appt scheduled 03/23/2023                        Last appt 09/19/022      Last Written     omeprazole (PRILOSEC) 20 MG delayed release capsule  03/31/2022  #90  1 RF    oxybutynin (DITROPAN) 5 MG tablet  05/31/2022  #90  3 RF     24HR ALLERGY RELIEF 180 MG tablet  05/31/2022  #30  3 RF     donepezil (ARICEPT) 5 MG tablet  03/31/2022  #90  1 RF

## 2022-09-27 RX ORDER — OXYBUTYNIN CHLORIDE 5 MG/1
TABLET ORAL
Qty: 90 TABLET | Refills: 3 | Status: SHIPPED | OUTPATIENT
Start: 2022-09-27

## 2022-09-27 RX ORDER — OMEPRAZOLE 20 MG/1
CAPSULE, DELAYED RELEASE ORAL
Qty: 90 CAPSULE | Refills: 1 | Status: SHIPPED | OUTPATIENT
Start: 2022-09-27

## 2022-09-27 RX ORDER — FEXOFENADINE HYDROCHLORIDE 180 MG/1
TABLET, FILM COATED ORAL
Qty: 30 TABLET | Refills: 3 | Status: SHIPPED | OUTPATIENT
Start: 2022-09-27

## 2022-09-27 RX ORDER — DONEPEZIL HYDROCHLORIDE 5 MG/1
5 TABLET, FILM COATED ORAL NIGHTLY
Qty: 90 TABLET | Refills: 1 | Status: SHIPPED | OUTPATIENT
Start: 2022-09-27

## 2022-12-02 NOTE — PROGRESS NOTES
[facility]     Outpatient Follow Up Note    Subjective:  Tabatha Cevallos is here today for cardiology follow up of CAD, permanent AF, HLD, bilat leg edema . C/o arthritis in hands, edema     [place]:  LOV 8/8/22 stopped digoxin    Today, she reports her arthritis in her hands is really bad. She is not able to pick things up off the floor. She reports her legs are really swollen. Patient denies current chest pain, sob, palpitations, dizziness or syncope. Patient is taking all cardiac medications as prescribed and tolerates them well. Patient is vaccinated against Covid. Moderna 2/2     PMH:   stable CAD, chronic afib rate controlled anticoagulated on treatment with statins for hyperlipidemia. On 07/13/20, she reported recently doing work outside, [address] In the garden and weeding flowers. She developed CP took 2 nitro which relieved the pain. She had intermittent BLE edema she has been taking a diuretic  couple times a week which has helped with edema. Otherwise she reported doing ok.      12 point ROS negative in all areas as listed below except as in [address]  Constitutional, EENT, pulmonary, GI, , Musculoskeletal, skin, neurological, hematological, endocrine, Psychiatric    Past Medical History:   Diagnosis Date    Atherosclerosis of native coronary artery of native heart without angina pectoris     cath 1/12 , 4/12 (occluded RCa with patent collateral flow    Chronic atrial fibrillation ([address] 1/12    Closed left hip fracture ([address] 1/16    THR    DDD (degenerative disc disease), cervical     Emphysema of lung ([address] 2007    mild    GERD without esophagitis 2001    gerd with stricture    Hyperlipidemia     Osteopenia 8/11    recheck DEXA 2014    Primary osteoarthritis involving multiple joints     Routine health maintenance     TAC 9/99, DEXA 03    Urinary incontinence     urge     Social History:    Social History     Tobacco Use   Smoking Status Former    Types: Cigarettes   Smokeless Tobacco Never Tobacco Comments    quit in 1990   Family history non contributory  On sister living 2 yrs older apparently doing well in [place]  Current Medications:  Current Outpatient Medications   Medication Sig Dispense Refill    pravastatin (PRAVACHOL) 20 MG tablet TAKE 1 TABLET ONCE DAILY 90 tablet 3    furosemide (LASIX) 20 MG tablet Take 1 tablet by mouth daily 90 tablet 3    donepezil (ARICEPT) 5 MG tablet TAKE 1 TABLET BY MOUTH NIGHTLY 90 tablet 1    oxybutynin (DITROPAN) 5 MG tablet TAKE 1 TABLET BY MOUTH THREE TIMES A DAY AS NEEDED 90 tablet 3    omeprazole (PRILOSEC) 20 MG delayed release capsule TAKE 1 CAPSULE BY MOUTH EVERY DAY 90 capsule 1    magnesium oxide (MAG-OX) 400 (240 Mg) MG tablet TAKE 1 TABLET BY MOUTH EVERY DAY 30 tablet 5    rivaroxaban (XARELTO) 20 MG TABS tablet TAKE 1 TABLET BY MOUTH EVERY DAY 90 tablet 3    Lactobacillus (ACIDOPHILUS EXTRA STRENGTH PO) Take by mouth      Multiple Vitamins-Minerals (CENTRUM ADULTS PO) Take by mouth daily      Cholecalciferol (D3 VITAMIN PO) Take by mouth      nitroGLYCERIN (NITROSTAT) 0.4 MG SL tablet USE 1 TABLET UNDER TONGUE AS NEEDED FOR CHEST PAIN MAY REPEAT EVERY 5M IN. UP TO 3. THEN GO TO ER 25 tablet 0    dilTIAZem (TIAZAC) 240 MG extended release capsule Take 240 mg by mouth daily      ALLERGY RELIEF 180 MG tablet TAKE 1 TABLET BY MOUTH EVERY DAY 30 tablet 3     No current facility-administered medications for this visit. REVIEW OF SYSTEMS:    12 point ROS negative in all areas as listed below except as in [place]  Constitutional, EENT, pulmonary, GI, , Musculoskeletal, skin, neurological, hematological, endocrine, Psychiatric      Objective:   PHYSICAL EXAM:      Pulse 66/min irreg irreg   VITALS:    Ht 5' 1\" (1.549 m)   Wt 152 lb 3.2 oz (69 kg)   BMI 28.76 kg/m²    No orthostatic hypotension.   Wt Readings from Last 3 Encounters:   12/05/22 152 lb 3.2 oz (69 kg)   09/19/22 146 lb (66.2 kg)   08/08/22 146 lb 6.4 oz (66.4 kg)     CONSTITUTIONAL: Cooperative, no apparent distress, and appears well nourished / developed  NEUROLOGIC:  Awake and oriented to person, place and time. PSYCH: Calm affect. SKIN: Warm and dry cracked in left lower leg. HEENT: Sclera non-icteric, normocephalic, neck supple, no elevation of JVP, normal carotid pulses with no bruits and thyroid normal size. LUNGS: clear lung sounds, h/o bronchitis No increased work of breathing, no wheezing  CARDIOVASCULAR: Irregularly irregular rhythm 66 bpm with no murmurs, gallops, rubs, or abnormal heart sounds, normal PMI. The apical impulses not displaced  JVP less than 8 cm H2O  The carotid upstroke is normal in amplitude and contour without delay or bruit  JVP is not elevated  EXT: 2+ edma bilaterally and weeping, no calf tenderness. Pulses are present bilaterally.  No knee pain or swelling  severe osteoarthritis of hands     DATA:    Lab Results   Component Value Date    ALT 13 02/25/2022    AST 20 02/25/2022    ALKPHOS 53 02/25/2022    BILITOT 0.3 02/25/2022     Lab Results   Component Value Date    CREATININE 0.9 02/25/2022    BUN 13 02/25/2022     02/25/2022    K 4.4 02/25/2022     02/25/2022    CO2 28 02/25/2022     Lab Results   Component Value Date    TSH 2.03 09/10/2012     Lab Results   Component Value Date    WBC 5.1 02/25/2022    HGB 12.4 02/25/2022    HCT 37.2 02/25/2022    MCV 87.9 02/25/2022     02/25/2022     No components found for: CHLPL  Lab Results   Component Value Date    TRIG 61 06/28/2019    TRIG 85 03/01/2018    TRIG 65 02/16/2017     Lab Results   Component Value Date    HDL 58 02/25/2022    HDL 61 (H) 03/09/2021    HDL 66 (H) 06/28/2019     Lab Results   Component Value Date    LDLCALC 47 02/25/2022    LDLCALC 72 03/09/2021    LDLCALC 40 06/28/2019     Lab Results   Component Value Date    LABVLDL 17 02/25/2022    LABVLDL 18 03/09/2021    LABVLDL 12 06/28/2019     Lab Results   Component Value Date    DIGOXIN <0.3 (L) 02/25/2022     Imaging:  Labs from march 2021 reviewed with the patient  Labs in Feb 2022 lipids at goal electrolytes normal TSH normal   EKG 2/24/20   Atrial fibrillation 64 bpm  -  Nonspecific T-abnormality. CT head 1/11/19  No evidence of acute intracranial abnormality. EKG 11/13/18  Atrial fibrillationAbnormal ECGWhen compared with ECG of 07-JAN-2016 08:46,No significant change was foundConfirmed by [facility] TALON DAVIS, Sruthi Kirkland ([id number]) on 11/13/2018 11:05:05 AM    CXR 11/13/18  Possible left upper lobe pulmonary nodule. Routine chest CT is recommended for further evaluation. No acute disease. 8/25/16 EKG  A Fib - HR 94    CATH 4/24/12  1. Remote, non-dominant right coronary artery occlusion with   collateralization. 2. No significant disease along the left coronary artery. 3. Normal left ventricular systolic function. EF 55%  4. No evidence for aortic dissection. Cath 1/2012. [facility] RCA small nondominant 100%. Nonobstructive LAD/LCx.     ECHO from 4/12 showed Normal LV systolic function. EF 55%. Mild aortic valve sclerosis. Biatrial enlargement. Moderate TR with mildly elevated pulmonary pressure. Mild MR. 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 Atherosclerosis of native coronary artery of native heart without angina pectoris Yes    Permanent atrial fibrillation ([facility]     Pure hypercholesterolemia     Medication management     Bilateral lower extremity edema        Currently no angina continue metoprolol  She is on Xarelto for afib rate is controlled   She is anticoagulated  discussed fall prevention use stool do not squat or bend down     1. CAD (coronary artery disease) : stable    2. AF (atrial fibrillation) permanent : Rate controlled and anticoagulated    3.   HLD - well controlled on statins labs 2.25.22 LDL 47 continue pravastatin    4 dermatitis left leg  Recommend compression socks moisturizing lotion left leg keep legs elevated during day as often as you can. [id number] Score for Atrial Fibrillation Stroke Risk   Risk   Factors  Component Value   C CHF No 0   H HTN No 0   A2 Age >= 76 Yes,  (80 y.o.) 2   D DM No 0   S2 Prior Stroke/TIA No 0   V Vascular Disease No 0   A Age 74-69 No,  (80 y.o.) 0   Sc Sex female 1    [id number]  Score  3   Score last updated 12/5/22 58:06 PM EST    Click here for a link to the UpToDate guideline \"Atrial Fibrillation: Anticoagulation therapy to prevent embolization    Disclaimer: Risk Score calculation is dependent on accuracy of patient problem list and past encounter diagnosis. Plan:   1. Current epic labs reviewed with patient  2. Current medications reviewed. Refills given as warranted. 3. Ask your pcp for a prescription for physical therapy for your hands.   -they can't fix you arthritis but they can work with your fingers, loosen them up and make it better  4. Stop taking Cimetidine/tagamet and famotidine/pepcid   -you are currently taking 3 pills for the same thing   -continue taking omeprazole/prilosec. 5. I recommend getting a pair a diabetic socks that come to your knee which will help the swelling in your legs. 6. Try to keep your legs elevated above your heart when you are sitting  7. Repeat blood work in 2/2023   -CBC, CMP, TSH, and fasting lipids (8 hours) ordered.    -you will need to be fasting    Follow up with me in 6 months    This note is scribed in the presence of Dr. Godwin Armenta MD by Collette Quiver, RN.    I, Dr. De Patel, personally performed the services described in this documentation, as scribed by the above signed scribe in my presence. It is both accurate and complete to my knowledge. I agree with the details independently gathered by the clinical support staff, while the remaining scribed note accurately describes my personal service to the patient. QUALITY MEASURES  1. Tobacco Cessation Counseling: NA  2. Retake of BP if >140/90:   NA  3.  Documentation to PCP/referring for new patient:  Sent to PCP at close of office visit  4. CAD patient on anti-platelet: anticoagulated  5. CAD patient on STATIN therapy:  Yes  6.  Patient with CHF and aFib on anticoagulation:  Yes         Unknown MD Negin

## 2022-12-05 ENCOUNTER — OFFICE VISIT (OUTPATIENT)
Dept: CARDIOLOGY CLINIC | Age: 87
End: 2022-12-05
Payer: MEDICARE

## 2022-12-05 VITALS — WEIGHT: 152.2 LBS | BODY MASS INDEX: 28.74 KG/M2 | HEIGHT: 61 IN

## 2022-12-05 DIAGNOSIS — R60.0 BILATERAL LOWER EXTREMITY EDEMA: ICD-10-CM

## 2022-12-05 DIAGNOSIS — I48.21 PERMANENT ATRIAL FIBRILLATION (HCC): ICD-10-CM

## 2022-12-05 DIAGNOSIS — I25.10 ATHEROSCLEROSIS OF NATIVE CORONARY ARTERY OF NATIVE HEART WITHOUT ANGINA PECTORIS: Primary | ICD-10-CM

## 2022-12-05 DIAGNOSIS — E78.00 PURE HYPERCHOLESTEROLEMIA: ICD-10-CM

## 2022-12-05 DIAGNOSIS — Z79.899 MEDICATION MANAGEMENT: ICD-10-CM

## 2022-12-05 PROCEDURE — 1123F ACP DISCUSS/DSCN MKR DOCD: CPT | Performed by: INTERNAL MEDICINE

## 2022-12-05 PROCEDURE — 99214 OFFICE O/P EST MOD 30 MIN: CPT | Performed by: INTERNAL MEDICINE

## 2022-12-05 RX ORDER — PRAVASTATIN SODIUM 20 MG
TABLET ORAL
Qty: 90 TABLET | Refills: 3 | Status: SHIPPED | OUTPATIENT
Start: 2022-12-05

## 2022-12-05 RX ORDER — DILTIAZEM HYDROCHLORIDE 240 MG/1
240 CAPSULE, EXTENDED RELEASE ORAL DAILY
COMMUNITY

## 2022-12-05 RX ORDER — FUROSEMIDE 20 MG/1
20 TABLET ORAL DAILY
Qty: 90 TABLET | Refills: 3 | Status: SHIPPED | OUTPATIENT
Start: 2022-12-05

## 2022-12-05 NOTE — LETTER
[facility] [address]  Phone: [phone]  Fax: [phone]    Mehul Gomez MD    December 5, 2022     Elmo Valdes, APRN - [id number] [address]    Patient: Royce Rose   MR Number: [id number]   YOB: 1926   Date of Visit: 12/5/2022       Dear Elmo Valdes: Thank you for referring Demetris Owens to me for evaluation/treatment. Below are the relevant portions of my assessment and plan of care. If you have questions, please do not hesitate to call me. I look forward to following Reina Jimenes along with you.     Sincerely,      Mehul Gomez MD

## 2022-12-05 NOTE — PATIENT INSTRUCTIONS
Plan:   1. Current epic labs reviewed with patient  2. Current medications reviewed. Refills given as warranted. 3. Ask your pcp for a prescription for physical therapy for your hands.   -they can't fix you arthritis but they can work with your fingers, loosen them up and make it better  4. Stop taking Cimetidine/tagamet and famotidine/pepcid   -you are currently taking 3 pills for the same thing   -continue taking omeprazole/prilosec. 5. I recommend getting a pair a diabetic socks that come to your knee which will help the swelling in your legs. 6. Try to keep your legs elevated above your heart when you are sitting  7.  Repeat blood work in 2/2023   -CBC, CMP, TSH, and fasting lipids (8 hours) ordered.    -you will need to be fasting    Follow up with me in 6 months

## 2022-12-30 DIAGNOSIS — R09.89 CHEST CONGESTION: ICD-10-CM

## 2022-12-30 DIAGNOSIS — R05.9 COUGH: ICD-10-CM

## 2022-12-30 RX ORDER — FEXOFENADINE HYDROCHLORIDE 180 MG/1
TABLET, FILM COATED ORAL
Qty: 30 TABLET | Refills: 5 | Status: SHIPPED | OUTPATIENT
Start: 2022-12-30

## 2023-01-13 ENCOUNTER — TELEPHONE (OUTPATIENT)
Dept: FAMILY MEDICINE CLINIC | Age: 88
End: 2023-01-13

## 2023-01-25 RX ORDER — OXYBUTYNIN CHLORIDE 5 MG/1
TABLET ORAL
Qty: 90 TABLET | Refills: 3 | Status: SHIPPED | OUTPATIENT
Start: 2023-01-25

## 2023-01-27 ENCOUNTER — TELEPHONE (OUTPATIENT)
Dept: CARDIOLOGY CLINIC | Age: 88
End: 2023-01-27

## 2023-01-27 RX ORDER — FUROSEMIDE 20 MG/1
40 TABLET ORAL DAILY
Qty: 90 TABLET | Refills: 3 | Status: SHIPPED | OUTPATIENT
Start: 2023-01-27

## 2023-01-27 NOTE — TELEPHONE ENCOUNTER
Patient called office with reports of legs still seeping fluid despite compression hose and elevation. Fluid is saturating stockings and shoes. Please advise. LOV 12/5/2022.

## 2023-01-31 RX ORDER — FUROSEMIDE 40 MG/1
40 TABLET ORAL DAILY
Qty: 90 TABLET | Refills: 3 | Status: SHIPPED | OUTPATIENT
Start: 2023-01-31

## 2023-01-31 NOTE — TELEPHONE ENCOUNTER
Spoke with pt and relayed RKG message. She will go to [facility] ED in 7-10 days. She will have all blood work that was ordered in December completed that day. She will be fasting.

## 2023-02-01 ENCOUNTER — OFFICE VISIT (OUTPATIENT)
Dept: ORTHOPEDIC SURGERY | Age: 88
End: 2023-02-01
Payer: MEDICARE

## 2023-02-01 DIAGNOSIS — M17.11 PRIMARY OSTEOARTHRITIS OF RIGHT KNEE: Primary | ICD-10-CM

## 2023-02-01 PROCEDURE — 20610 DRAIN/INJ JOINT/BURSA W/O US: CPT | Performed by: ORTHOPAEDIC SURGERY

## 2023-02-01 PROCEDURE — 99999 PR OFFICE/OUTPT VISIT,PROCEDURE ONLY: CPT | Performed by: ORTHOPAEDIC SURGERY

## 2023-02-01 RX ORDER — BUPIVACAINE HYDROCHLORIDE 2.5 MG/ML
7 INJECTION, SOLUTION INFILTRATION; PERINEURAL ONCE
Status: COMPLETED | OUTPATIENT
Start: 2023-02-01 | End: 2023-02-01

## 2023-02-01 RX ORDER — TRIAMCINOLONE ACETONIDE 40 MG/ML
40 INJECTION, SUSPENSION INTRA-ARTICULAR; INTRAMUSCULAR ONCE
Status: COMPLETED | OUTPATIENT
Start: 2023-02-01 | End: 2023-02-01

## 2023-02-01 RX ADMIN — TRIAMCINOLONE ACETONIDE 40 MG: 40 INJECTION, SUSPENSION INTRA-ARTICULAR; INTRAMUSCULAR at 13:18

## 2023-02-01 RX ADMIN — BUPIVACAINE HYDROCHLORIDE 17.5 MG: 2.5 INJECTION, SOLUTION INFILTRATION; PERINEURAL at 13:19

## 2023-02-01 NOTE — PROGRESS NOTES
She presents requesting cortisone injection to the right knee for diagnosis of osteoarthritis    Recommendation is for a cortisone injection into the right knee. After informed consent was received from the patient, the right knee was injected with 1 mL of 40mg/ml of Kenalog  and 4 mL of 0.25% Marcaine  in the syringe from an anterolateral joint line approach, using a 25-gauge needle, under sterile Betadine prep, using ethyl chloride as a topical refrigerant, for a diagnosis of osteoarthritis. The patient appeared to tolerate it well. The patient should return here periodically as needed. Encounter Diagnosis   Name Primary? Primary osteoarthritis of right knee Yes        No orders of the defined types were placed in this encounter.

## 2023-02-08 RX ORDER — NITROGLYCERIN 0.4 MG/1
TABLET SUBLINGUAL
Qty: 25 TABLET | Refills: 0 | Status: SHIPPED | OUTPATIENT
Start: 2023-02-08

## 2023-02-27 DIAGNOSIS — R25.2 MUSCLE CRAMPS: ICD-10-CM

## 2023-02-27 RX ORDER — LANOLIN ALCOHOL/MO/W.PET/CERES
CREAM (GRAM) TOPICAL
Qty: 30 TABLET | Refills: 5 | Status: SHIPPED | OUTPATIENT
Start: 2023-02-27

## 2023-03-09 ENCOUNTER — HOSPITAL ENCOUNTER (OUTPATIENT)
Age: 88
Discharge: HOME | End: 2023-03-09
Payer: MEDICARE

## 2023-03-09 ENCOUNTER — TELEPHONE (OUTPATIENT)
Dept: CARDIOLOGY CLINIC | Age: 88
End: 2023-03-09

## 2023-03-09 DIAGNOSIS — Z79.899 MEDICATION MANAGEMENT: ICD-10-CM

## 2023-03-09 LAB
A/G RATIO: 1.5 (ref 1.1–2.2)
ALBUMIN SERPL-MCNC: 4.3 G/DL (ref 3.4–5)
ALP BLD-CCNC: 73 U/L (ref 40–129)
ALT SERPL-CCNC: 10 U/L (ref 10–40)
ANION GAP SERPL CALCULATED.3IONS-SCNC: 7 MMOL/L (ref 3–16)
AST SERPL-CCNC: 20 U/L (ref 15–37)
BASOPHILS ABSOLUTE: 0 K/UL (ref 0–0.2)
BASOPHILS RELATIVE PERCENT: 0.4 %
BILIRUB SERPL-MCNC: 0.5 MG/DL (ref 0–1)
BUN BLDV-MCNC: 21 MG/DL (ref 7–20)
CALCIUM SERPL-MCNC: 9.9 MG/DL (ref 8.3–10.6)
CHLORIDE BLD-SCNC: 103 MMOL/L (ref 99–110)
CO2: 32 MMOL/L (ref 21–32)
CREAT SERPL-MCNC: 0.8 MG/DL (ref 0.6–1.2)
EOSINOPHILS ABSOLUTE: 0.2 K/UL (ref 0–0.6)
EOSINOPHILS RELATIVE PERCENT: 3.1 %
GFR SERPL CREATININE-BSD FRML MDRD: >60 ML/MIN/{1.73_M2}
GLUCOSE BLD-MCNC: 104 MG/DL (ref 70–99)
HCT VFR BLD CALC: 36 % (ref 36–48)
HEMOGLOBIN: 12 G/DL (ref 12–16)
LYMPHOCYTES ABSOLUTE: 0.8 K/UL (ref 1–5.1)
LYMPHOCYTES RELATIVE PERCENT: 13.6 %
MCH RBC QN AUTO: 28.8 PG (ref 26–34)
MCHC RBC AUTO-ENTMCNC: 33.3 G/DL (ref 31–36)
MCV RBC AUTO: 86.3 FL (ref 80–100)
MONOCYTES ABSOLUTE: 0.5 K/UL (ref 0–1.3)
MONOCYTES RELATIVE PERCENT: 8.8 %
NEUTROPHILS ABSOLUTE: 4.4 K/UL (ref 1.7–7.7)
NEUTROPHILS RELATIVE PERCENT: 74.1 %
PDW BLD-RTO: 14.8 % (ref 12.4–15.4)
PLATELET # BLD: 198 K/UL (ref 135–450)
PMV BLD AUTO: 6.6 FL (ref 5–10.5)
POTASSIUM SERPL-SCNC: 3.8 MMOL/L (ref 3.5–5.1)
RBC # BLD: 4.17 M/UL (ref 4–5.2)
SODIUM BLD-SCNC: 142 MMOL/L (ref 136–145)
TOTAL PROTEIN: 7.2 G/DL (ref 6.4–8.2)
WBC # BLD: 5.9 K/UL (ref 4–11)

## 2023-03-09 PROCEDURE — 80053 COMPREHEN METABOLIC PANEL: CPT

## 2023-03-09 PROCEDURE — 84443 ASSAY THYROID STIM HORMONE: CPT

## 2023-03-09 PROCEDURE — 36415 COLL VENOUS BLD VENIPUNCTURE: CPT

## 2023-03-09 PROCEDURE — 80061 LIPID PANEL: CPT

## 2023-03-09 PROCEDURE — 85025 COMPLETE CBC W/AUTO DIFF WBC: CPT

## 2023-03-09 NOTE — TELEPHONE ENCOUNTER
Called and spoke with patient. Informed her of Dr Neeru Carey result message. Patient verbally understood.

## 2023-03-09 NOTE — TELEPHONE ENCOUNTER
----- Message from Major Moise MD sent at 3/9/2023  2:45 PM EST -----  CBC and chemistry panel looks good   Rest of it is pending

## 2023-03-10 ENCOUNTER — TELEPHONE (OUTPATIENT)
Dept: CARDIOLOGY CLINIC | Age: 88
End: 2023-03-10

## 2023-03-10 LAB
CHOLESTEROL, TOTAL: 126 MG/DL (ref 0–199)
HDLC SERPL-MCNC: 63 MG/DL (ref 40–60)
LDL CHOLESTEROL CALCULATED: 52 MG/DL
TRIGL SERPL-MCNC: 54 MG/DL (ref 0–150)
TSH REFLEX FT4: 2.49 UIU/ML (ref 0.27–4.2)
VLDLC SERPL CALC-MCNC: 11 MG/DL

## 2023-03-10 NOTE — TELEPHONE ENCOUNTER
----- Message from Enedina Webb MD sent at 3/10/2023 12:36 PM EST -----  Good lipids and thyroid test

## 2023-03-10 NOTE — TELEPHONE ENCOUNTER
----- Message from Joseph Weston MD sent at 3/10/2023 12:36 PM EST -----  Good lipids and thyroid test

## 2023-03-10 NOTE — TELEPHONE ENCOUNTER
----- Message from Leon Wylie MD sent at 3/9/2023  2:45 PM EST -----  CBC and chemistry panel looks good   Rest of it is pending

## 2023-03-10 NOTE — TELEPHONE ENCOUNTER
Attempted to call pt with lab results. NO answer. Voice mailbox not accepting messages.  Office will call back

## 2023-03-26 NOTE — PROGRESS NOTES
pravastatin (PRAVACHOL) 20 MG tablet TAKE 1 TABLET ONCE DAILY 90 tablet 3    donepezil (ARICEPT) 5 MG tablet TAKE 1 TABLET BY MOUTH NIGHTLY 90 tablet 1    omeprazole (PRILOSEC) 20 MG delayed release capsule TAKE 1 CAPSULE BY MOUTH EVERY DAY 90 capsule 1    rivaroxaban (XARELTO) 20 MG TABS tablet TAKE 1 TABLET BY MOUTH EVERY DAY 90 tablet 3    Lactobacillus (ACIDOPHILUS EXTRA STRENGTH PO) Take by mouth      Multiple Vitamins-Minerals (CENTRUM ADULTS PO) Take by mouth daily      Cholecalciferol (D3 VITAMIN PO) Take by mouth       No current facility-administered medications for this visit. Allergies   Allergen Reactions    Lipitor      pain    Morphine Other (See Comments)     confusion    Penicillin G Sodium Other (See Comments)    Penicillins Hives    Sulfa Antibiotics Nausea And Vomiting       REVIEW OF SYSTEMS  Review of Systems   Constitutional:  Negative for activity change, appetite change, chills and fever. HENT:  Negative for congestion, rhinorrhea and sore throat. Eyes:  Negative for visual disturbance. Respiratory:  Negative for cough, chest tightness, shortness of breath and wheezing. Cardiovascular:  Negative for chest pain, palpitations and leg swelling. Gastrointestinal:  Negative for abdominal pain, diarrhea, nausea and vomiting. Genitourinary:  Negative for dysuria, frequency, hematuria and urgency. Musculoskeletal:  Negative for gait problem and myalgias. Skin:  Negative for rash. Cyst Right axilla   Neurological:  Negative for dizziness, weakness, light-headedness, numbness and headaches. Psychiatric/Behavioral:  Negative for self-injury and sleep disturbance. The patient is not nervous/anxious. PHYSICAL EXAM  /78 (Site: Left Upper Arm, Position: Sitting, Cuff Size: Medium Adult)   Pulse 66   Resp 16   Ht 5' 1\" (1.549 m)   Wt 148 lb (67.1 kg)   SpO2 94%   BMI 27.96 kg/m²   Physical Exam  Vitals reviewed.    Constitutional:       General: She is not in

## 2023-03-27 ENCOUNTER — OFFICE VISIT (OUTPATIENT)
Dept: FAMILY MEDICINE CLINIC | Age: 88
End: 2023-03-27
Payer: MEDICARE

## 2023-03-27 VITALS
RESPIRATION RATE: 16 BRPM | OXYGEN SATURATION: 94 % | DIASTOLIC BLOOD PRESSURE: 78 MMHG | SYSTOLIC BLOOD PRESSURE: 132 MMHG | HEART RATE: 66 BPM | WEIGHT: 148 LBS | BODY MASS INDEX: 27.94 KG/M2 | HEIGHT: 61 IN

## 2023-03-27 DIAGNOSIS — F02.80 LATE ONSET ALZHEIMER'S DISEASE WITHOUT BEHAVIORAL DISTURBANCE (HCC): ICD-10-CM

## 2023-03-27 DIAGNOSIS — G30.1 LATE ONSET ALZHEIMER'S DISEASE WITHOUT BEHAVIORAL DISTURBANCE (HCC): ICD-10-CM

## 2023-03-27 DIAGNOSIS — I48.21 PERMANENT ATRIAL FIBRILLATION (HCC): ICD-10-CM

## 2023-03-27 DIAGNOSIS — E78.00 PURE HYPERCHOLESTEROLEMIA: ICD-10-CM

## 2023-03-27 DIAGNOSIS — L72.9 CYST OF SKIN: ICD-10-CM

## 2023-03-27 DIAGNOSIS — K21.9 GERD WITHOUT ESOPHAGITIS: ICD-10-CM

## 2023-03-27 DIAGNOSIS — J43.1 PANLOBULAR EMPHYSEMA (HCC): ICD-10-CM

## 2023-03-27 DIAGNOSIS — M15.9 PRIMARY OSTEOARTHRITIS INVOLVING MULTIPLE JOINTS: ICD-10-CM

## 2023-03-27 DIAGNOSIS — I25.10 ATHEROSCLEROSIS OF NATIVE CORONARY ARTERY OF NATIVE HEART WITHOUT ANGINA PECTORIS: Primary | ICD-10-CM

## 2023-03-27 PROCEDURE — 1123F ACP DISCUSS/DSCN MKR DOCD: CPT | Performed by: NURSE PRACTITIONER

## 2023-03-27 PROCEDURE — 99214 OFFICE O/P EST MOD 30 MIN: CPT | Performed by: NURSE PRACTITIONER

## 2023-03-27 RX ORDER — NITROGLYCERIN 0.3 MG/1
TABLET SUBLINGUAL
COMMUNITY

## 2023-03-27 RX ORDER — CEPHALEXIN 500 MG/1
500 CAPSULE ORAL 3 TIMES DAILY
Qty: 30 CAPSULE | Refills: 0 | Status: SHIPPED | OUTPATIENT
Start: 2023-03-27

## 2023-03-27 RX ORDER — RIVAROXABAN 10 MG/1
TABLET, FILM COATED ORAL
COMMUNITY

## 2023-03-27 ASSESSMENT — PATIENT HEALTH QUESTIONNAIRE - PHQ9
SUM OF ALL RESPONSES TO PHQ QUESTIONS 1-9: 1
SUM OF ALL RESPONSES TO PHQ9 QUESTIONS 1 & 2: 1
2. FEELING DOWN, DEPRESSED OR HOPELESS: 1
1. LITTLE INTEREST OR PLEASURE IN DOING THINGS: 0
SUM OF ALL RESPONSES TO PHQ QUESTIONS 1-9: 1

## 2023-03-27 ASSESSMENT — ENCOUNTER SYMPTOMS
WHEEZING: 0
DIARRHEA: 0
RHINORRHEA: 0
VOMITING: 0
NAUSEA: 0
ABDOMINAL PAIN: 0
CHEST TIGHTNESS: 0
COUGH: 0
SHORTNESS OF BREATH: 0
SORE THROAT: 0

## 2023-04-05 RX ORDER — DONEPEZIL HYDROCHLORIDE 5 MG/1
TABLET, FILM COATED ORAL
Qty: 90 TABLET | Refills: 1 | Status: SHIPPED | OUTPATIENT
Start: 2023-04-05

## 2023-04-05 NOTE — TELEPHONE ENCOUNTER
Future appt scheduled 10/5/2023  Last appt 3/27/2023  Pharmacy today and ask if this could be filled as soon as possible because because it is a packaged medication and they fell behind

## 2023-04-26 RX ORDER — OMEPRAZOLE 20 MG/1
CAPSULE, DELAYED RELEASE ORAL
Qty: 90 CAPSULE | Refills: 1 | Status: SHIPPED | OUTPATIENT
Start: 2023-04-26

## 2023-04-26 NOTE — TELEPHONE ENCOUNTER
Future appt scheduled 10/25/2023                    Last appt 03/27/2023      Last Written 09/27/2022    omeprazole (PRILOSEC) 20 MG delayed release capsule  #90  1 RF

## 2023-05-17 ENCOUNTER — OFFICE VISIT (OUTPATIENT)
Dept: ORTHOPEDIC SURGERY | Age: 88
End: 2023-05-17

## 2023-05-17 VITALS — WEIGHT: 148 LBS | BODY MASS INDEX: 27.94 KG/M2 | HEIGHT: 61 IN

## 2023-05-17 DIAGNOSIS — M25.561 RIGHT KNEE PAIN, UNSPECIFIED CHRONICITY: ICD-10-CM

## 2023-05-17 DIAGNOSIS — M17.11 PRIMARY OSTEOARTHRITIS OF RIGHT KNEE: Primary | ICD-10-CM

## 2023-05-17 RX ORDER — TRIAMCINOLONE ACETONIDE 40 MG/ML
40 INJECTION, SUSPENSION INTRA-ARTICULAR; INTRAMUSCULAR ONCE
Status: COMPLETED | OUTPATIENT
Start: 2023-05-17 | End: 2023-05-17

## 2023-05-17 RX ORDER — BUPIVACAINE HYDROCHLORIDE 2.5 MG/ML
7 INJECTION, SOLUTION INFILTRATION; PERINEURAL ONCE
Status: COMPLETED | OUTPATIENT
Start: 2023-05-17 | End: 2023-05-17

## 2023-05-17 RX ADMIN — TRIAMCINOLONE ACETONIDE 40 MG: 40 INJECTION, SUSPENSION INTRA-ARTICULAR; INTRAMUSCULAR at 11:59

## 2023-05-17 RX ADMIN — BUPIVACAINE HYDROCHLORIDE 17.5 MG: 2.5 INJECTION, SOLUTION INFILTRATION; PERINEURAL at 11:59

## 2023-05-17 NOTE — PROGRESS NOTES
Recommendation is for a cortisone injection into the right knee. After informed consent was received from the patient, the right knee was injected with 1 mL of 40mg/ml of Kenalog  and 4 mL of 0.25% Marcaine  in the syringe from an anterolateral joint line approach, using a 25-gauge needle, under sterile Betadine prep, using ethyl chloride as a topical refrigerant, for a diagnosis of osteoarthritis. The patient appeared to tolerate it well. The patient should return here periodically as needed. Encounter Diagnoses   Name Primary?     Right knee pain, unspecified chronicity     Primary osteoarthritis of right knee Yes        Orders Placed This Encounter   Procedures    70019 - MI DRAIN/INJECT LARGE JOINT/BURSA

## 2023-05-25 RX ORDER — OXYBUTYNIN CHLORIDE 5 MG/1
TABLET ORAL
Qty: 90 TABLET | Refills: 5 | Status: SHIPPED | OUTPATIENT
Start: 2023-05-25

## 2023-06-12 PROBLEM — D68.69 SECONDARY HYPERCOAGULABLE STATE (HCC): Status: ACTIVE | Noted: 2023-06-12

## 2023-06-23 DIAGNOSIS — R05.9 COUGH: ICD-10-CM

## 2023-06-23 DIAGNOSIS — R09.89 CHEST CONGESTION: ICD-10-CM

## 2023-06-23 RX ORDER — FEXOFENADINE HCL 180 MG/1
TABLET ORAL
Qty: 30 TABLET | Refills: 5 | Status: SHIPPED | OUTPATIENT
Start: 2023-06-23

## 2023-07-07 ENCOUNTER — APPOINTMENT (OUTPATIENT)
Dept: GENERAL RADIOLOGY | Age: 88
End: 2023-07-07
Payer: MEDICARE

## 2023-07-07 ENCOUNTER — OFFICE VISIT (OUTPATIENT)
Dept: FAMILY MEDICINE CLINIC | Age: 88
End: 2023-07-07

## 2023-07-07 ENCOUNTER — HOSPITAL ENCOUNTER (EMERGENCY)
Age: 88
Discharge: HOME | End: 2023-07-07
Attending: EMERGENCY MEDICINE
Payer: MEDICARE

## 2023-07-07 VITALS
BODY MASS INDEX: 26.24 KG/M2 | RESPIRATION RATE: 23 BRPM | WEIGHT: 139 LBS | DIASTOLIC BLOOD PRESSURE: 61 MMHG | HEIGHT: 61 IN | OXYGEN SATURATION: 98 % | SYSTOLIC BLOOD PRESSURE: 103 MMHG | TEMPERATURE: 98.2 F | HEART RATE: 80 BPM

## 2023-07-07 VITALS
HEIGHT: 61 IN | DIASTOLIC BLOOD PRESSURE: 56 MMHG | SYSTOLIC BLOOD PRESSURE: 96 MMHG | TEMPERATURE: 99.1 F | BODY MASS INDEX: 26.24 KG/M2 | WEIGHT: 139 LBS | OXYGEN SATURATION: 90 % | HEART RATE: 88 BPM

## 2023-07-07 DIAGNOSIS — R53.83 OTHER FATIGUE: ICD-10-CM

## 2023-07-07 DIAGNOSIS — J18.9 PNEUMONIA OF RIGHT UPPER LOBE DUE TO INFECTIOUS ORGANISM: Primary | ICD-10-CM

## 2023-07-07 DIAGNOSIS — R63.0 DECREASED APPETITE: ICD-10-CM

## 2023-07-07 DIAGNOSIS — R06.02 SHORTNESS OF BREATH: ICD-10-CM

## 2023-07-07 DIAGNOSIS — Z91.89 AT RISK FOR DEHYDRATION DUE TO POOR FLUID INTAKE: ICD-10-CM

## 2023-07-07 DIAGNOSIS — R09.02 HYPOXIC: ICD-10-CM

## 2023-07-07 DIAGNOSIS — J02.9 SORETHROAT: Primary | ICD-10-CM

## 2023-07-07 LAB
ALBUMIN SERPL-MCNC: 3.8 G/DL (ref 3.4–5)
ALBUMIN/GLOB SERPL: 1.3 {RATIO} (ref 1.1–2.2)
ALP SERPL-CCNC: 50 U/L (ref 40–129)
ALT SERPL-CCNC: 10 U/L (ref 10–40)
ANION GAP SERPL CALCULATED.3IONS-SCNC: 15 MMOL/L (ref 3–16)
AST SERPL-CCNC: 25 U/L (ref 15–37)
BACTERIA URNS QL MICRO: ABNORMAL /HPF
BASE EXCESS BLDV CALC-SCNC: -3 MMOL/L (ref -3–3)
BASOPHILS # BLD: 0 K/UL (ref 0–0.2)
BASOPHILS NFR BLD: 0.2 %
BILIRUB SERPL-MCNC: 0.7 MG/DL (ref 0–1)
BILIRUB UR QL STRIP.AUTO: NEGATIVE
BUN SERPL-MCNC: 22 MG/DL (ref 7–20)
CALCIUM SERPL-MCNC: 9.4 MG/DL (ref 8.3–10.6)
CHLORIDE SERPL-SCNC: 98 MMOL/L (ref 99–110)
CLARITY UR: CLEAR
CO2 BLDV-SCNC: 21 MMOL/L
CO2 SERPL-SCNC: 23 MMOL/L (ref 21–32)
COHGB MFR BLDV: 1.2 % (ref 0–1.5)
COLOR UR: YELLOW
CREAT SERPL-MCNC: 0.8 MG/DL (ref 0.6–1.2)
D DIMER: 0.81 UG/ML FEU (ref 0–0.6)
DEPRECATED RDW RBC AUTO: 15 % (ref 12.4–15.4)
EOSINOPHIL # BLD: 0 K/UL (ref 0–0.6)
EOSINOPHIL NFR BLD: 0.4 %
GFR SERPLBLD CREATININE-BSD FMLA CKD-EPI: >60 ML/MIN/{1.73_M2}
GLUCOSE SERPL-MCNC: 105 MG/DL (ref 70–99)
GLUCOSE UR STRIP.AUTO-MCNC: NEGATIVE MG/DL
HCO3 BLDV-SCNC: 20 MMOL/L (ref 23–29)
HCT VFR BLD AUTO: 35.7 % (ref 36–48)
HGB BLD-MCNC: 11.9 G/DL (ref 12–16)
HGB UR QL STRIP.AUTO: NEGATIVE
HYALINE CASTS #/AREA URNS LPF: ABNORMAL /LPF (ref 0–2)
KETONES UR STRIP.AUTO-MCNC: ABNORMAL MG/DL
LACTATE BLDV-SCNC: 1 MMOL/L (ref 0.4–1.9)
LEUKOCYTE ESTERASE UR QL STRIP.AUTO: NEGATIVE
LYMPHOCYTES # BLD: 0.7 K/UL (ref 1–5.1)
LYMPHOCYTES NFR BLD: 7.8 %
MCH RBC QN AUTO: 29.4 PG (ref 26–34)
MCHC RBC AUTO-ENTMCNC: 33.4 G/DL (ref 31–36)
MCV RBC AUTO: 88 FL (ref 80–100)
METHGB MFR BLDV: 0 %
MONOCYTES # BLD: 0.7 K/UL (ref 0–1.3)
MONOCYTES NFR BLD: 7.6 %
MUCOUS THREADS #/AREA URNS LPF: ABNORMAL /LPF
NEUTROPHILS # BLD: 8 K/UL (ref 1.7–7.7)
NEUTROPHILS NFR BLD: 84 %
NITRITE UR QL STRIP.AUTO: NEGATIVE
NT-PROBNP SERPL-MCNC: 1016 PG/ML (ref 0–449)
O2 THERAPY: ABNORMAL
PCO2 BLDV: 29.5 MMHG (ref 40–50)
PH BLDV: 7.45 [PH] (ref 7.35–7.45)
PH UR STRIP.AUTO: 5.5 [PH] (ref 5–8)
PLATELET # BLD AUTO: 139 K/UL (ref 135–450)
PMV BLD AUTO: 7.6 FL (ref 5–10.5)
PO2 BLDV: 109.6 MMHG (ref 25–40)
POTASSIUM SERPL-SCNC: 4.2 MMOL/L (ref 3.5–5.1)
PROT SERPL-MCNC: 6.7 G/DL (ref 6.4–8.2)
PROT UR STRIP.AUTO-MCNC: ABNORMAL MG/DL
RBC # BLD AUTO: 4.06 M/UL (ref 4–5.2)
RBC #/AREA URNS HPF: ABNORMAL /HPF (ref 0–4)
SAO2 % BLDV: 98 %
SODIUM SERPL-SCNC: 136 MMOL/L (ref 136–145)
SP GR UR STRIP.AUTO: 1.02 (ref 1–1.03)
TROPONIN, HIGH SENSITIVITY: 26 NG/L (ref 0–14)
TROPONIN, HIGH SENSITIVITY: 28 NG/L (ref 0–14)
UA COMPLETE W REFLEX CULTURE PNL UR: ABNORMAL
UA DIPSTICK W REFLEX MICRO PNL UR: YES
URN SPEC COLLECT METH UR: ABNORMAL
UROBILINOGEN UR STRIP-ACNC: 0.2 E.U./DL
WBC # BLD AUTO: 9.5 K/UL (ref 4–11)
WBC #/AREA URNS HPF: ABNORMAL /HPF (ref 0–5)

## 2023-07-07 PROCEDURE — 96367 TX/PROPH/DG ADDL SEQ IV INF: CPT

## 2023-07-07 PROCEDURE — 93005 ELECTROCARDIOGRAM TRACING: CPT | Performed by: EMERGENCY MEDICINE

## 2023-07-07 PROCEDURE — 87040 BLOOD CULTURE FOR BACTERIA: CPT

## 2023-07-07 PROCEDURE — 6360000002 HC RX W HCPCS: Performed by: EMERGENCY MEDICINE

## 2023-07-07 PROCEDURE — 96365 THER/PROPH/DIAG IV INF INIT: CPT

## 2023-07-07 PROCEDURE — 36415 COLL VENOUS BLD VENIPUNCTURE: CPT

## 2023-07-07 PROCEDURE — 83605 ASSAY OF LACTIC ACID: CPT

## 2023-07-07 PROCEDURE — 83880 ASSAY OF NATRIURETIC PEPTIDE: CPT

## 2023-07-07 PROCEDURE — 84484 ASSAY OF TROPONIN QUANT: CPT

## 2023-07-07 PROCEDURE — 2580000003 HC RX 258: Performed by: EMERGENCY MEDICINE

## 2023-07-07 PROCEDURE — 85025 COMPLETE CBC W/AUTO DIFF WBC: CPT

## 2023-07-07 PROCEDURE — 99285 EMERGENCY DEPT VISIT HI MDM: CPT

## 2023-07-07 PROCEDURE — 80053 COMPREHEN METABOLIC PANEL: CPT

## 2023-07-07 PROCEDURE — 85379 FIBRIN DEGRADATION QUANT: CPT

## 2023-07-07 PROCEDURE — 82803 BLOOD GASES ANY COMBINATION: CPT

## 2023-07-07 PROCEDURE — 71046 X-RAY EXAM CHEST 2 VIEWS: CPT

## 2023-07-07 PROCEDURE — 81001 URINALYSIS AUTO W/SCOPE: CPT

## 2023-07-07 RX ORDER — CEPHALEXIN 500 MG/1
500 CAPSULE ORAL 3 TIMES DAILY
Qty: 21 CAPSULE | Refills: 0 | Status: SHIPPED | OUTPATIENT
Start: 2023-07-07 | End: 2023-07-14

## 2023-07-07 RX ORDER — DOXYCYCLINE HYCLATE 100 MG
100 TABLET ORAL 2 TIMES DAILY
Qty: 14 TABLET | Refills: 0 | Status: SHIPPED | OUTPATIENT
Start: 2023-07-07 | End: 2023-07-14

## 2023-07-07 RX ORDER — 0.9 % SODIUM CHLORIDE 0.9 %
500 INTRAVENOUS SOLUTION INTRAVENOUS ONCE
Status: COMPLETED | OUTPATIENT
Start: 2023-07-07 | End: 2023-07-07

## 2023-07-07 RX ADMIN — SODIUM CHLORIDE 500 ML: 9 INJECTION, SOLUTION INTRAVENOUS at 18:50

## 2023-07-07 RX ADMIN — CEFTRIAXONE SODIUM 1000 MG: 1 INJECTION, POWDER, FOR SOLUTION INTRAMUSCULAR; INTRAVENOUS at 20:34

## 2023-07-07 RX ADMIN — AZITHROMYCIN MONOHYDRATE 500 MG: 500 INJECTION, POWDER, LYOPHILIZED, FOR SOLUTION INTRAVENOUS at 21:10

## 2023-07-07 SDOH — ECONOMIC STABILITY: INCOME INSECURITY: HOW HARD IS IT FOR YOU TO PAY FOR THE VERY BASICS LIKE FOOD, HOUSING, MEDICAL CARE, AND HEATING?: NOT HARD AT ALL

## 2023-07-07 SDOH — ECONOMIC STABILITY: HOUSING INSECURITY
IN THE LAST 12 MONTHS, WAS THERE A TIME WHEN YOU DID NOT HAVE A STEADY PLACE TO SLEEP OR SLEPT IN A SHELTER (INCLUDING NOW)?: NO

## 2023-07-07 SDOH — ECONOMIC STABILITY: FOOD INSECURITY: WITHIN THE PAST 12 MONTHS, YOU WORRIED THAT YOUR FOOD WOULD RUN OUT BEFORE YOU GOT MONEY TO BUY MORE.: NEVER TRUE

## 2023-07-07 SDOH — ECONOMIC STABILITY: FOOD INSECURITY: WITHIN THE PAST 12 MONTHS, THE FOOD YOU BOUGHT JUST DIDN'T LAST AND YOU DIDN'T HAVE MONEY TO GET MORE.: NEVER TRUE

## 2023-07-07 ASSESSMENT — ENCOUNTER SYMPTOMS
GASTROINTESTINAL NEGATIVE: 1
EYES NEGATIVE: 1
SHORTNESS OF BREATH: 1
SORE THROAT: 1
COUGH: 1

## 2023-07-07 ASSESSMENT — PAIN - FUNCTIONAL ASSESSMENT: PAIN_FUNCTIONAL_ASSESSMENT: NONE - DENIES PAIN

## 2023-07-07 NOTE — ED NOTES
Report given to [facility], RN     Estella Hess, RAMON  07/07/23 [id number]

## 2023-07-07 NOTE — PROGRESS NOTES
CHIEF COMPLAINT  Chief Complaint   Patient presents with    Pharyngitis     Throat is so sore tht she hasnt eaten or drinking in 5 days         HPI   Milly Leigh is a 80 y.o. female who presents to the office accompanied by her niece for evaluation of decreased appetite, poor fluid intake, sore throat, cough, chills. Patient niece reports that she has not seen her in 5 days and reports that she did miss [employer] on Sunday. Patient reports that she has been sitting at home ill. Patient reports that there were a few days where she was unable to take her medications or get up out of her chair. Patient does report mild shortness of breath upon exertion. No complaint of vomiting or diarrhea. Patient niece reports that she has been in the same clothes over the past several days as well. Patient denies any dizziness, lightheadedness, chest pain. No other complaints, modifying factors or associated symptoms. Nursing notes reviewed.    Past Medical History:   Diagnosis Date    Atherosclerosis of native coronary artery of native heart without angina pectoris     cath 1/12 , 4/12 (occluded RCa with patent collateral flow    Chronic atrial fibrillation (HCC) 1/12    Closed left hip fracture ([address]) 1/16    THR    DDD (degenerative disc disease), cervical     Emphysema of lung ([address]) 2007    mild    GERD without esophagitis 2001    gerd with stricture    Hyperlipidemia     Osteopenia 8/11    recheck DEXA 2014    Primary osteoarthritis involving multiple joints     Routine health maintenance     TAC 9/99, DEXA 03    Urinary incontinence     urge     Past Surgical History:   Procedure Laterality Date    APPENDECTOMY      CATARACT REMOVAL      CHOLECYSTECTOMY      COLONOSCOPY  6/12    Dr Ana Maria Calvin, COLON, DIAGNOSTIC      HYSTERECTOMY (CERVIX STATUS UNKNOWN)      JOINT REPLACEMENT  01/2016    left hip    JOINT REPLACEMENT Left 09/23/2016    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     Family

## 2023-07-07 NOTE — ED PROVIDER NOTES
[address]  [phone]    Schedule an appointment as soon as possible for a visit in 2 days        _____________________________________      I, Mira Richter DO, ([facility]) am the primary attending of record and contributed the majority of evaluation and treatment of emergent care for this encounter. This chart was generated in part by using Dragon Dictation system and may contain errors related to that system including errors in grammar, punctuation, and spelling, as well as words and phrases that may be inappropriate. If there are any questions or concerns please feel free to contact the dictating provider for clarification.      MIRA RICHTER DO (electronically signed)   [address] [place] [address] DO Caitlyn  07/09/23 111

## 2023-07-08 LAB
BACTERIA BLD CULT ORG #2: NORMAL
EKG ATRIAL RATE: 70 BPM
EKG DIAGNOSIS: NORMAL
EKG Q-T INTERVAL: 396 MS
EKG QRS DURATION: 70 MS
EKG QTC CALCULATION (BAZETT): 445 MS
EKG R AXIS: 57 DEGREES
EKG T AXIS: 50 DEGREES
EKG VENTRICULAR RATE: 76 BPM

## 2023-07-08 PROCEDURE — 93010 ELECTROCARDIOGRAM REPORT: CPT | Performed by: INTERNAL MEDICINE

## 2023-07-08 NOTE — ED NOTES
Patient unable to do home medications and patients family member at bedside does not know them      Cedric Huntley RN  07/07/23 6075

## 2023-07-09 LAB — BACTERIA BLD CULT: NORMAL

## 2023-07-09 ASSESSMENT — ENCOUNTER SYMPTOMS
DIARRHEA: 0
BACK PAIN: 0
ABDOMINAL PAIN: 0
VOMITING: 0
RHINORRHEA: 0
CHEST TIGHTNESS: 0
SHORTNESS OF BREATH: 1
COUGH: 1

## 2023-07-10 ENCOUNTER — CARE COORDINATION (OUTPATIENT)
Dept: CARE COORDINATION | Age: 88
End: 2023-07-10

## 2023-07-11 ENCOUNTER — CARE COORDINATION (OUTPATIENT)
Dept: CARE COORDINATION | Age: 88
End: 2023-07-11

## 2023-07-11 ENCOUNTER — APPOINTMENT (OUTPATIENT)
Dept: GENERAL RADIOLOGY | Age: 88
End: 2023-07-11
Payer: MEDICARE

## 2023-07-11 ENCOUNTER — HOSPITAL ENCOUNTER (EMERGENCY)
Age: 88
Discharge: HOME | End: 2023-07-11
Payer: MEDICARE

## 2023-07-11 ENCOUNTER — TELEPHONE (OUTPATIENT)
Dept: FAMILY MEDICINE CLINIC | Age: 88
End: 2023-07-11

## 2023-07-11 VITALS
SYSTOLIC BLOOD PRESSURE: 109 MMHG | DIASTOLIC BLOOD PRESSURE: 86 MMHG | TEMPERATURE: 98.3 F | OXYGEN SATURATION: 97 % | RESPIRATION RATE: 16 BRPM | HEART RATE: 73 BPM

## 2023-07-11 DIAGNOSIS — J98.11 ATELECTASIS: Primary | ICD-10-CM

## 2023-07-11 LAB
ALBUMIN SERPL-MCNC: 3.4 G/DL (ref 3.4–5)
ALBUMIN/GLOB SERPL: 1.2 {RATIO} (ref 1.1–2.2)
ALP SERPL-CCNC: 59 U/L (ref 40–129)
ALT SERPL-CCNC: 16 U/L (ref 10–40)
ANION GAP SERPL CALCULATED.3IONS-SCNC: 15 MMOL/L (ref 3–16)
AST SERPL-CCNC: 28 U/L (ref 15–37)
BACTERIA BLD CULT ORG #2: NORMAL
BASOPHILS # BLD: 0 K/UL (ref 0–0.2)
BASOPHILS NFR BLD: 0.8 %
BILIRUB SERPL-MCNC: 0.3 MG/DL (ref 0–1)
BUN SERPL-MCNC: 11 MG/DL (ref 7–20)
CALCIUM SERPL-MCNC: 8.8 MG/DL (ref 8.3–10.6)
CHLORIDE SERPL-SCNC: 103 MMOL/L (ref 99–110)
CO2 SERPL-SCNC: 23 MMOL/L (ref 21–32)
CREAT SERPL-MCNC: 0.7 MG/DL (ref 0.6–1.2)
DEPRECATED RDW RBC AUTO: 14.3 % (ref 12.4–15.4)
EOSINOPHIL # BLD: 0.3 K/UL (ref 0–0.6)
EOSINOPHIL NFR BLD: 9.5 %
GFR SERPLBLD CREATININE-BSD FMLA CKD-EPI: >60 ML/MIN/{1.73_M2}
GLUCOSE SERPL-MCNC: 104 MG/DL (ref 70–99)
HCT VFR BLD AUTO: 33.8 % (ref 36–48)
HGB BLD-MCNC: 11.5 G/DL (ref 12–16)
LACTATE BLDV-SCNC: 1.2 MMOL/L (ref 0.4–1.9)
LYMPHOCYTES # BLD: 0.6 K/UL (ref 1–5.1)
LYMPHOCYTES NFR BLD: 16.9 %
MCH RBC QN AUTO: 29.5 PG (ref 26–34)
MCHC RBC AUTO-ENTMCNC: 34 G/DL (ref 31–36)
MCV RBC AUTO: 86.9 FL (ref 80–100)
MONOCYTES # BLD: 0.4 K/UL (ref 0–1.3)
MONOCYTES NFR BLD: 10.3 %
NEUTROPHILS # BLD: 2.3 K/UL (ref 1.7–7.7)
NEUTROPHILS NFR BLD: 62.5 %
PLATELET # BLD AUTO: 176 K/UL (ref 135–450)
PMV BLD AUTO: 7 FL (ref 5–10.5)
POTASSIUM SERPL-SCNC: 3.8 MMOL/L (ref 3.5–5.1)
PROT SERPL-MCNC: 6.2 G/DL (ref 6.4–8.2)
RBC # BLD AUTO: 3.89 M/UL (ref 4–5.2)
SODIUM SERPL-SCNC: 141 MMOL/L (ref 136–145)
WBC # BLD AUTO: 3.6 K/UL (ref 4–11)

## 2023-07-11 PROCEDURE — 71045 X-RAY EXAM CHEST 1 VIEW: CPT

## 2023-07-11 PROCEDURE — 80053 COMPREHEN METABOLIC PANEL: CPT

## 2023-07-11 PROCEDURE — 85025 COMPLETE CBC W/AUTO DIFF WBC: CPT

## 2023-07-11 PROCEDURE — 36415 COLL VENOUS BLD VENIPUNCTURE: CPT

## 2023-07-11 PROCEDURE — 83605 ASSAY OF LACTIC ACID: CPT

## 2023-07-11 PROCEDURE — 99284 EMERGENCY DEPT VISIT MOD MDM: CPT

## 2023-07-11 ASSESSMENT — PAIN SCALES - GENERAL: PAINLEVEL_OUTOF10: 0

## 2023-07-11 ASSESSMENT — PAIN - FUNCTIONAL ASSESSMENT: PAIN_FUNCTIONAL_ASSESSMENT: 0-10

## 2023-07-11 NOTE — TELEPHONE ENCOUNTER
Patient went to Hospital on Friday and she came back home on Friday, patient refused to stay the night. Fermin Villanueva who helps patient feels uneasy about since care coordinator Haylee Barfield) told her The ER doctor told patient her oxygen could drop during the night and be fatal possibly, care taker  feels uneasy since she does'nt know what oxygen level is, She would like you to see patient today. Patient very weak please advise.   [phone] Debra Calderon)

## 2023-07-11 NOTE — CARE COORDINATION
Ambulatory Care Coordination  ED Follow up Call    Noted: Patient was offered admission to ED d/t hypoxia and she declined admission    Reason for ED visit:  Pneumonia      Status:     improved  Spoke with patient's niece, Ms Yosvany Moscoso role of ACM/CC  Ms Neftali Bhatti reports she visits patient daily and patient has been improving  Sore throat resolved and patient now eating  Denies fever or N/V  Patient has resumed her ADL's and walking up and down stairs. Patient's niece (Ms. Neftali Bhatti) reports she has been reminding patient to take her antibiotics  Ms. Neftali Bhatti has been visiting patient daily to ensure she is eating and taking her medications  She will check the antibiotic bottles and count the pills to ensure patient is taking her medication as prescribed  Ms. Neftali Bhatti reports if patient has not been taking her antibiotics as prescribed she take patient back to the ED  Ms. Neftali Bhatti agrees to contact the PCP office to schedule ED f/u appointment with PCP after she checks with patient       Did you call your PCP prior to going to the ED? Not Applicable      Did you receive a discharge instructions from the Emergency Room? Yes  Review of Instructions:     Understands what to report/when to return?:  Yes   Understands discharge instructions?:  Yes   Following discharge instructions?:  Yes       Are there any other complaints/concerns that you wish to tell your provider? No  New Medications?:   Yes-reviewed with patient's niece      Medication Reconciliation by phone - No  Understands Medications? Yes  Taking Medications?  See above  FU appts/Provider:    Future Appointments   Date Time Provider [address] [place] [address]   10/5/2023 12:00 PM TYLER Marino - CNP [facility] [place]   10/16/2023 11:00 AM MD MARTHA Olivares

## 2023-07-11 NOTE — TELEPHONE ENCOUNTER
Please inform patient that she needs to go back to the hospital due to concerns of her condition declining immediately.

## 2023-07-11 NOTE — TELEPHONE ENCOUNTER
I called Vivienne Lopez Patients niece and informed her Kyle Nieto wants Radha Rao to go back to [address] [place] [address] due to no way of checking oxygen and patient being so weak, Vivienne Lopez said she would go talk with Radha Rao and try to get her to.

## 2023-07-12 ENCOUNTER — CARE COORDINATION (OUTPATIENT)
Dept: CARE COORDINATION | Age: 88
End: 2023-07-12

## 2023-07-12 LAB — BACTERIA BLD CULT: NORMAL

## 2023-07-12 NOTE — CARE COORDINATION
you rate their social network (family, work, friends)?: Good participation with social networks   How would you rate their financial resources (including ability to afford all required medical care)?: Financially secure, resources adequate, no identified problems   How wells does the patient now understand their health and well-being (symptoms, signs or risk factors) and what they need to do to manage their health?: Reasonable to good understanding and already engages in managing health or is willing to undertake better management   How well do you think your patient can engage in healthcare discussions?  (Barriers include language, deafness, aphasia, alcohol or drug problems, learning difficulties, concentration): Some difficulties in communication with or without moderate barriers   Do other services need to be involved to help this patient?: Other care/services not required at this time   Suggested Interventions and [employer]                  Future Appointments   Date Time Provider [address] [place] [address]   10/5/2023 12:00 PM TYLER Baker CNP [facility]   10/16/2023 11:00 AM MD MARTHA Rangel

## 2023-07-12 NOTE — PROGRESS NOTES
Patient came in with her niece; she is here with symptoms from her pneumonia; Reviewed her SDOH; She has a lot of neighbors and family and people in the community who check in on her a few days a week; shared information about the Senior Companionship program in the event she wants extra help around the house or companionship on days she doesn't have help.

## 2023-07-13 ASSESSMENT — ENCOUNTER SYMPTOMS
SORE THROAT: 0
NAUSEA: 0
COUGH: 0
ABDOMINAL PAIN: 0
VOMITING: 0
BACK PAIN: 0
EYE PAIN: 0
RHINORRHEA: 0
SHORTNESS OF BREATH: 0

## 2023-07-13 NOTE — ED PROVIDER NOTES
in the emergency department was 97%. This alleviated a lot of the patient's family patient's concerns. She has no discomfort. Her vital signs and laboratory tests are grossly unremarkable. Her x-ray displayed some possible atelectasis versus pneumonia. I favor atelectasis at this time. The patient is well-appearing here in the emergency department I offered hospitalization for her general weakness but patient states that she feels comfortable going home. Otherwise encouraged him to have strict return precautions and if having any concerns please return back to the emergency department for evaluation. Disposition Considerations (tests considered but not done, Admit vs D/C, Shared Decision Making, Pt Expectation of Test or Tx.):       I am the Primary Clinician of Record. FINAL IMPRESSION      1.  Atelectasis          DISPOSITION/PLAN     DISPOSITION Decision To Discharge 07/11/2023 09:03:35 PM      PATIENT REFERRED TO:  TYLER Plaza CNP  [address] [address]  [phone]    Schedule an appointment as soon as possible for a visit       [facility]  [address] [place], [place] [id number] [phone]  Go to   If symptoms worsen      DISCHARGE MEDICATIONS:  Discharge Medication List as of 7/11/2023  9:12 PM          DISCONTINUED MEDICATIONS:  Discharge Medication List as of 7/11/2023  9:12 PM                 (Please note that portions of this note were completed with a voice recognition program.  Efforts were made to edit the dictations but occasionally words are mis-transcribed.)    TYLER Martinez CNP (electronically signed)       TYLER Martinez CNP  07/13/23 6350

## 2023-07-28 DIAGNOSIS — I48.20 CHRONIC ATRIAL FIBRILLATION (HCC): ICD-10-CM

## 2023-08-11 ENCOUNTER — CARE COORDINATION (OUTPATIENT)
Dept: CARE COORDINATION | Age: 88
End: 2023-08-11

## 2023-08-11 NOTE — CARE COORDINATION
Attempted to contact patient's caregiver for CC f/u call; left HIPPA compliant message and ACM contact information.

## 2023-08-14 ENCOUNTER — CARE COORDINATION (OUTPATIENT)
Dept: CARE COORDINATION | Age: 88
End: 2023-08-14

## 2023-08-22 DIAGNOSIS — R07.89 OTHER CHEST PAIN: ICD-10-CM

## 2023-08-22 RX ORDER — NITROGLYCERIN 0.4 MG/1
TABLET SUBLINGUAL
Qty: 25 TABLET | Refills: 3 | Status: SHIPPED | OUTPATIENT
Start: 2023-08-22

## 2023-08-22 NOTE — TELEPHONE ENCOUNTER
Medication Refill    Medication needing refilled:nitroGLYCERIN (NITROSTAT) 0.4 MG SL tablet       Dosage of the medication:     .4MG    How are you taking this medication (QD, BID, TID, QID, PRN):Sig: USE 1 TABLET UNDER TONGUE AS NEEDED FOR CHEST PAIN MAY REPEAT EVERY 5M IN. UP TO 3.  THEN GO TO ER    30 or 90 day supply called in:30    When will you run out of your medication:Pt is out now    Which Pharmacy are we sending the medication to?:[facility] - [place], [place] - [address] [phone] [place] [phone] [address] [facility] [address]   Phone:  [phone]  Fax:  [phone]

## 2023-08-23 ENCOUNTER — CARE COORDINATION (OUTPATIENT)
Dept: CARE COORDINATION | Age: 88
End: 2023-08-23

## 2023-08-23 ENCOUNTER — TELEPHONE (OUTPATIENT)
Dept: CARDIOLOGY CLINIC | Age: 88
End: 2023-08-23

## 2023-08-23 RX ORDER — ISOSORBIDE MONONITRATE 30 MG/1
30 TABLET, EXTENDED RELEASE ORAL DAILY
Qty: 30 TABLET | Refills: 3 | Status: SHIPPED | OUTPATIENT
Start: 2023-08-23

## 2023-08-23 ASSESSMENT — SOCIAL DETERMINANTS OF HEALTH (SDOH)
HOW OFTEN DO YOU ATTENT MEETINGS OF THE CLUB OR ORGANIZATION YOU BELONG TO?: MORE THAN 4 TIMES PER YEAR
HOW OFTEN DO YOU ATTEND CHURCH OR RELIGIOUS SERVICES?: MORE THAN 4 TIMES PER YEAR
HOW OFTEN DO YOU GET TOGETHER WITH FRIENDS OR RELATIVES?: MORE THAN THREE TIMES A WEEK
IN A TYPICAL WEEK, HOW MANY TIMES DO YOU TALK ON THE PHONE WITH FAMILY, FRIENDS, OR NEIGHBORS?: MORE THAN THREE TIMES A WEEK
DO YOU BELONG TO ANY CLUBS OR ORGANIZATIONS SUCH AS CHURCH GROUPS UNIONS, FRATERNAL OR ATHLETIC GROUPS, OR SCHOOL GROUPS?: YES

## 2023-08-23 ASSESSMENT — ENCOUNTER SYMPTOMS: DYSPNEA ASSOCIATED WITH: EXERTION

## 2023-08-23 NOTE — TELEPHONE ENCOUNTER
Care coordinator called patient and reviewed medications with patient and patient was not sure if was taking diltiazem 240 mg daily or not. Is she supposed to be taking it? Per your note in 6/2023 she is. FYI-Also, patient had a 30 minute episode of chest pain Saturday and it resolved after taking 1 nitro.

## 2023-08-24 DIAGNOSIS — R25.2 MUSCLE CRAMPS: ICD-10-CM

## 2023-08-24 RX ORDER — LANOLIN ALCOHOL/MO/W.PET/CERES
CREAM (GRAM) TOPICAL
Qty: 30 TABLET | Refills: 5 | Status: SHIPPED | OUTPATIENT
Start: 2023-08-24

## 2023-08-24 NOTE — TELEPHONE ENCOUNTER
Is the patient suppose to be taking the Imdur or the Ditalzem?     Per RKG last note, She is suppose to be taking diltiazem( cardizem)  I sent a prescription for imdur 30mg  once a day to [facility]

## 2023-08-24 NOTE — TELEPHONE ENCOUNTER
RN Coordinator Devon Lancaster called and stated PT has not taken Ditalzem since 2018.  Devon Lancaster would like a call back [phone]

## 2023-08-24 NOTE — TELEPHONE ENCOUNTER
Care coordinator calling to update RKG on pt. Pt has not been taking dilTIAZem [facility]) since 2018, has not been in her pill makayla. Pt  does not monitor B/P at home. Does run low at times. HR runs . Pt was sick when HR was elevated. Wants to know if RKG wants pt to start meds. Please advise.

## 2023-08-25 RX ORDER — DILTIAZEM HYDROCHLORIDE 240 MG/1
240 CAPSULE, EXTENDED RELEASE ORAL DAILY
Qty: 30 CAPSULE | Refills: 3 | OUTPATIENT
Start: 2023-08-25

## 2023-08-25 NOTE — TELEPHONE ENCOUNTER
Spoke to nurse and clarified RKGs message    Nely Seals is suppose to be taking diltiazem( cardizem)  I sent a prescription for imdur 30mg  once a day to [place] [facility]\"    Diltiazem pended for review.

## 2023-08-25 NOTE — CARE COORDINATION
Noted Dr. Edison Hoffman reviewed this CC note and responded to his MA Corinne Kline)  Contacted Dr. Jabier Hyatt. Moose Mckeon attempted to contact this ACM yesterday; this [facility] returned call  Spoke with ARTURO Mckeon and she reports Dr. Edison Hoffman sent her a message -He is restarting the Diltiazem and holding off on the Imdur. She further reports she has already taken care of the medication changes.
This ACM contacted patient's pharmacy ([address]) and spoke with Ashleigh Tran reports patient has not filled Diltiazem since 2018 and the medication is not in her pill packs  Confirmed with patient's niece -The patient only uses [address]  Noted new order for Imdur    Patient does not monitor BP/HR at home; below are most recent v/s taken in July 2023    Note BP runs low and HR ranging 's when patient was seen in ED-see below  Will clarify if cardiology wants to start Imdur or resume Diltiazem
Symptoms:     Symptom course: stable  Breathlessness: exertion  Increase use of rapid acting/rescue inhaled medications?: Not Applicable  Change in chronic cough?: No/At Baseline  Change in sputum?: No/At Baseline  Sputum characteristics: Yellow, Thick  Self Monitoring - SaO2: No        Care Coordination Interventions    Referral from Primary Care Provider: No  Suggested Interventions and Community Resources          Goals Addressed    None         Future Appointments   Date Time Provider [address]   10/5/2023 12:00 PM TYLER Goodman - [facility] [place]   10/16/2023 11:00 AM MD MARTHA Whipple

## 2023-09-19 ENCOUNTER — CARE COORDINATION (OUTPATIENT)
Dept: CARE COORDINATION | Age: 88
End: 2023-09-19

## 2023-09-19 NOTE — CARE COORDINATION
Attempted to contact patient for CC f/u  Unable to leave message-VM states \"Due to network difficulties your call cannot be completed at this time, pleas try your call again later. \"

## 2023-09-25 ENCOUNTER — CARE COORDINATION (OUTPATIENT)
Dept: CARE COORDINATION | Age: 88
End: 2023-09-25

## 2023-09-25 NOTE — CARE COORDINATION
Attempted to contact patient for CC f/u call; phone rings and rings with no answer. Reviewed prepackaged medication list with patient's pharmacy.

## 2023-10-05 ENCOUNTER — OFFICE VISIT (OUTPATIENT)
Dept: FAMILY MEDICINE CLINIC | Age: 88
End: 2023-10-05

## 2023-10-05 VITALS
DIASTOLIC BLOOD PRESSURE: 66 MMHG | HEART RATE: 59 BPM | WEIGHT: 148.25 LBS | SYSTOLIC BLOOD PRESSURE: 112 MMHG | TEMPERATURE: 98.3 F | BODY MASS INDEX: 27.99 KG/M2 | HEIGHT: 61 IN | OXYGEN SATURATION: 92 %

## 2023-10-05 DIAGNOSIS — F02.80 LATE ONSET ALZHEIMER'S DISEASE WITHOUT BEHAVIORAL DISTURBANCE (HCC): ICD-10-CM

## 2023-10-05 DIAGNOSIS — G30.1 LATE ONSET ALZHEIMER'S DISEASE WITHOUT BEHAVIORAL DISTURBANCE (HCC): ICD-10-CM

## 2023-10-05 DIAGNOSIS — J43.1 PANLOBULAR EMPHYSEMA (HCC): ICD-10-CM

## 2023-10-05 DIAGNOSIS — I25.10 ATHEROSCLEROSIS OF NATIVE CORONARY ARTERY OF NATIVE HEART WITHOUT ANGINA PECTORIS: Primary | ICD-10-CM

## 2023-10-05 DIAGNOSIS — K21.9 GERD WITHOUT ESOPHAGITIS: ICD-10-CM

## 2023-10-05 DIAGNOSIS — E78.00 PURE HYPERCHOLESTEROLEMIA: ICD-10-CM

## 2023-10-05 DIAGNOSIS — Z23 NEED FOR INFLUENZA VACCINATION: ICD-10-CM

## 2023-10-05 DIAGNOSIS — I48.20 CHRONIC ATRIAL FIBRILLATION (HCC): ICD-10-CM

## 2023-10-05 PROBLEM — R07.9 CHEST PAIN: Status: RESOLVED | Noted: 2018-11-13 | Resolved: 2023-10-05

## 2023-10-05 PROBLEM — M25.561 RIGHT KNEE PAIN: Status: RESOLVED | Noted: 2017-03-29 | Resolved: 2023-10-05

## 2023-10-05 ASSESSMENT — ENCOUNTER SYMPTOMS
SORE THROAT: 0
VOMITING: 0
CHEST TIGHTNESS: 0
ABDOMINAL PAIN: 0
COUGH: 0
NAUSEA: 0
SHORTNESS OF BREATH: 0
WHEEZING: 0
DIARRHEA: 0
RHINORRHEA: 0

## 2023-10-05 NOTE — PROGRESS NOTES
CHIEF COMPLAINT  Chief Complaint   Patient presents with    6 Month Follow-Up     Cough getting worse         HPI   Luciano Carrel is a 80 y.o. female who presents to the office for  6-month follow-up. Patient reports that she is doing well. Patient denies any recent changes in medications. No episodes of dizziness, lightheadedness, chest pain or shortness of breath. No abdominal pain or discomfort. Patient reports eating and drinking appropriately and taking medications as prescribed. patient Is accompanied by her niece today. patient reports chronic on going cough worse the past few weeks but contributes it to allergies. No other complaints, modifying factors or associated symptoms. Nursing notes reviewed.    Past Medical History:   Diagnosis Date    Atherosclerosis of native coronary artery of native heart without angina pectoris     cath 1/12 , 4/12 (occluded RCa with patent collateral flow    Chronic atrial fibrillation (HCC) 1/12    Closed left hip fracture ([address]) 1/16    THR    DDD (degenerative disc disease), cervical     Emphysema of lung ([address]) 2007    mild    GERD without esophagitis 2001    gerd with stricture    Hyperlipidemia     Osteopenia 8/11    recheck DEXA 2014    Primary osteoarthritis involving multiple joints     Routine health maintenance     TAC 9/99, DEXA 03    Urinary incontinence     urge     Past Surgical History:   Procedure Laterality Date    APPENDECTOMY      CATARACT REMOVAL      CHOLECYSTECTOMY      COLONOSCOPY  6/12    Dr Quan Gaona, COLON, DIAGNOSTIC      HYSTERECTOMY (CERVIX STATUS UNKNOWN)      JOINT REPLACEMENT  01/2016    left hip    JOINT REPLACEMENT Left 09/23/2016    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     Family History   Problem Relation Age of Onset    Arthritis Mother     Cancer Mother     Heart Disease Mother     High Blood Pressure Mother     Arthritis Father     Early Death Father     Heart Disease Father     Substance Abuse

## 2023-10-06 ENCOUNTER — CARE COORDINATION (OUTPATIENT)
Dept: CARE COORDINATION | Age: 88
End: 2023-10-06

## 2023-10-06 SDOH — ECONOMIC STABILITY: INCOME INSECURITY: IN THE LAST 12 MONTHS, WAS THERE A TIME WHEN YOU WERE NOT ABLE TO PAY THE MORTGAGE OR RENT ON TIME?: NO

## 2023-10-06 SDOH — ECONOMIC STABILITY: HOUSING INSECURITY: IN THE LAST 12 MONTHS, HOW MANY PLACES HAVE YOU LIVED?: 1

## 2023-10-06 NOTE — CARE COORDINATION
Attempted to contact patient for CC f/u call; phone rings and rings with no answer  Unable to leave voice message

## 2023-10-06 NOTE — CARE COORDINATION
Ambulatory Care Coordination Note  10/6/2023    Patient Current Location:  Home: [address] [place] [address]  [address] [address]     The patient returned call. Verified name and  with patient as identifiers. Provided introduction to self, and explanation of the ACM role. Challenges to be reviewed by the provider   Additional needs identified to be addressed with provider: No  none    PLAN:  Will f/u next week regarding transportation; see below           Method of communication with provider: none. ACM: Cari Sky RN    Patient returned call  Patient reports her niece usually takes her to all of her medical appointments  Patient informs this ACM that she will need to use community transportation for her next appointment  Assisted patient with setting up transportation with [employer] via three way call    [employer] representative then contacted this ACM and confirmed patient has medicaid however her medicaid does not cover the [address] transportation  She recommends contacting [facility] [place]  This ACM contacted Serafin Tran (Daria)-She reports all [employer] transportation must now go through [facility] first and if patient has medicaid it should be covered. This ACM contacted patient and and provided three way call with [employer]. Patient provided her contact information and additional information for Akash Mcgowan to check with Horacio Sheikh informed patient she will call her back to let the patient know and then they will schedule appointment  Patient was given [facility] transportation contact number and Akash Mcgowan instructed patient to call her back Monday afternoon if she does not hear from her sooner. This ACM attempted to contact patient's niece with update; left message along with ACM contact information.     Offered patient enrollment in the Remote Patient Monitoring (RPM) program for in-home monitoring: NA.    General Assessment    Do you have any symptoms that are causing concern?: No          Future Appointments   Date Time Provider

## 2023-10-09 ENCOUNTER — CARE COORDINATION (OUTPATIENT)
Dept: CARE COORDINATION | Age: 88
End: 2023-10-09

## 2023-10-09 ENCOUNTER — TELEPHONE (OUTPATIENT)
Dept: FAMILY MEDICINE CLINIC | Age: 88
End: 2023-10-09

## 2023-10-09 RX ORDER — DOXYCYCLINE HYCLATE 100 MG
100 TABLET ORAL 2 TIMES DAILY
Qty: 20 TABLET | Refills: 0 | Status: SHIPPED | OUTPATIENT
Start: 2023-10-09 | End: 2023-10-19

## 2023-10-09 NOTE — TELEPHONE ENCOUNTER
Patient's niece Derek Yoonica calling stating that when patient was seen on 10/5/23 she reported she had a cough with yellow sputum, states the cough is really getting her down no fever or other symptoms besides mild sob wanting to know if something can be called into [facility] for her cough, please advise.

## 2023-10-09 NOTE — CARE COORDINATION
Patient's niece Romy Public) left message to return call  Tiesha Hurtado reports she confirmed patient has cardiology appointment on 10/16/23  She further reports S scheduled transportation for this appointment and she will be picked up on 10/16/23 between 9:45-10:15 AM  She wanted to thank this [facility] for helping patient establish with transportation services.

## 2023-10-16 ENCOUNTER — APPOINTMENT (OUTPATIENT)
Dept: GENERAL RADIOLOGY | Age: 88
DRG: 292 | End: 2023-10-16
Payer: MEDICARE

## 2023-10-16 ENCOUNTER — HOSPITAL ENCOUNTER (INPATIENT)
Age: 88
LOS: 6 days | Discharge: HOME HEALTH | DRG: 292 | End: 2023-10-22
Attending: EMERGENCY MEDICINE | Admitting: INTERNAL MEDICINE
Payer: MEDICARE

## 2023-10-16 ENCOUNTER — OFFICE VISIT (OUTPATIENT)
Dept: CARDIOLOGY CLINIC | Age: 88
End: 2023-10-16
Payer: MEDICARE

## 2023-10-16 VITALS
SYSTOLIC BLOOD PRESSURE: 111 MMHG | OXYGEN SATURATION: 96 % | HEART RATE: 68 BPM | WEIGHT: 150 LBS | BODY MASS INDEX: 28.32 KG/M2 | HEIGHT: 61 IN | DIASTOLIC BLOOD PRESSURE: 69 MMHG

## 2023-10-16 DIAGNOSIS — I25.10 ATHEROSCLEROSIS OF NATIVE CORONARY ARTERY OF NATIVE HEART WITHOUT ANGINA PECTORIS: ICD-10-CM

## 2023-10-16 DIAGNOSIS — I50.21 ACUTE SYSTOLIC CONGESTIVE HEART FAILURE (HCC): Primary | ICD-10-CM

## 2023-10-16 DIAGNOSIS — E78.00 PURE HYPERCHOLESTEROLEMIA: ICD-10-CM

## 2023-10-16 DIAGNOSIS — R60.0 LOCALIZED EDEMA: ICD-10-CM

## 2023-10-16 DIAGNOSIS — I48.21 PERMANENT ATRIAL FIBRILLATION (HCC): ICD-10-CM

## 2023-10-16 DIAGNOSIS — R00.1 AV JUNCTIONAL BRADYCARDIA: ICD-10-CM

## 2023-10-16 DIAGNOSIS — I50.31 ACUTE DIASTOLIC HEART FAILURE (HCC): Primary | ICD-10-CM

## 2023-10-16 PROBLEM — I50.9 ACUTE CONGESTIVE HEART FAILURE, UNSPECIFIED HEART FAILURE TYPE (HCC): Status: ACTIVE | Noted: 2023-10-16

## 2023-10-16 LAB
ALBUMIN SERPL-MCNC: 4 G/DL (ref 3.4–5)
ALBUMIN/GLOB SERPL: 1.6 {RATIO} (ref 1.1–2.2)
ALP SERPL-CCNC: 71 U/L (ref 40–129)
ALT SERPL-CCNC: 9 U/L (ref 10–40)
ANION GAP SERPL CALCULATED.3IONS-SCNC: 11 MMOL/L (ref 3–16)
AST SERPL-CCNC: 17 U/L (ref 15–37)
BASOPHILS # BLD: 0.1 K/UL (ref 0–0.2)
BASOPHILS NFR BLD: 0.9 %
BILIRUB SERPL-MCNC: 0.5 MG/DL (ref 0–1)
BUN SERPL-MCNC: 19 MG/DL (ref 7–20)
CALCIUM SERPL-MCNC: 9.6 MG/DL (ref 8.3–10.6)
CHLORIDE SERPL-SCNC: 103 MMOL/L (ref 99–110)
CO2 SERPL-SCNC: 28 MMOL/L (ref 21–32)
CREAT SERPL-MCNC: 1 MG/DL (ref 0.6–1.2)
DEPRECATED RDW RBC AUTO: 14.7 % (ref 12.4–15.4)
EKG ATRIAL RATE: 441 BPM
EKG DIAGNOSIS: NORMAL
EKG Q-T INTERVAL: 506 MS
EKG QRS DURATION: 78 MS
EKG QTC CALCULATION (BAZETT): 412 MS
EKG R AXIS: 102 DEGREES
EKG T AXIS: -80 DEGREES
EKG VENTRICULAR RATE: 40 BPM
EOSINOPHIL # BLD: 0.3 K/UL (ref 0–0.6)
EOSINOPHIL NFR BLD: 4.5 %
GFR SERPLBLD CREATININE-BSD FMLA CKD-EPI: 51 ML/MIN/{1.73_M2}
GLUCOSE SERPL-MCNC: 122 MG/DL (ref 70–99)
HCT VFR BLD AUTO: 31.5 % (ref 36–48)
HGB BLD-MCNC: 10.6 G/DL (ref 12–16)
INR PPP: 1.73 (ref 0.84–1.16)
LYMPHOCYTES # BLD: 0.9 K/UL (ref 1–5.1)
LYMPHOCYTES NFR BLD: 14 %
MCH RBC QN AUTO: 29.3 PG (ref 26–34)
MCHC RBC AUTO-ENTMCNC: 33.6 G/DL (ref 31–36)
MCV RBC AUTO: 87.2 FL (ref 80–100)
MONOCYTES # BLD: 0.5 K/UL (ref 0–1.3)
MONOCYTES NFR BLD: 8.3 %
NEUTROPHILS # BLD: 4.4 K/UL (ref 1.7–7.7)
NEUTROPHILS NFR BLD: 72.3 %
NT-PROBNP SERPL-MCNC: 1619 PG/ML (ref 0–449)
PLATELET # BLD AUTO: 202 K/UL (ref 135–450)
PMV BLD AUTO: 7 FL (ref 5–10.5)
POTASSIUM SERPL-SCNC: 3.8 MMOL/L (ref 3.5–5.1)
PROT SERPL-MCNC: 6.5 G/DL (ref 6.4–8.2)
PROTHROMBIN TIME: 20.2 SEC (ref 11.5–14.8)
RBC # BLD AUTO: 3.61 M/UL (ref 4–5.2)
SARS-COV-2 RDRP RESP QL NAA+PROBE: NOT DETECTED
SODIUM SERPL-SCNC: 142 MMOL/L (ref 136–145)
TROPONIN, HIGH SENSITIVITY: 22 NG/L (ref 0–14)
TROPONIN, HIGH SENSITIVITY: 22 NG/L (ref 0–14)
TSH SERPL DL<=0.005 MIU/L-ACNC: 1.83 UIU/ML (ref 0.27–4.2)
WBC # BLD AUTO: 6.1 K/UL (ref 4–11)

## 2023-10-16 PROCEDURE — 2580000003 HC RX 258: Performed by: NURSE PRACTITIONER

## 2023-10-16 PROCEDURE — 1036F TOBACCO NON-USER: CPT | Performed by: INTERNAL MEDICINE

## 2023-10-16 PROCEDURE — G8484 FLU IMMUNIZE NO ADMIN: HCPCS | Performed by: INTERNAL MEDICINE

## 2023-10-16 PROCEDURE — 99222 1ST HOSP IP/OBS MODERATE 55: CPT | Performed by: STUDENT IN AN ORGANIZED HEALTH CARE EDUCATION/TRAINING PROGRAM

## 2023-10-16 PROCEDURE — 2580000003 HC RX 258

## 2023-10-16 PROCEDURE — 87635 SARS-COV-2 COVID-19 AMP PRB: CPT

## 2023-10-16 PROCEDURE — 36415 COLL VENOUS BLD VENIPUNCTURE: CPT

## 2023-10-16 PROCEDURE — 93005 ELECTROCARDIOGRAM TRACING: CPT | Performed by: EMERGENCY MEDICINE

## 2023-10-16 PROCEDURE — 85025 COMPLETE CBC W/AUTO DIFF WBC: CPT

## 2023-10-16 PROCEDURE — G8427 DOCREV CUR MEDS BY ELIG CLIN: HCPCS | Performed by: INTERNAL MEDICINE

## 2023-10-16 PROCEDURE — 6360000002 HC RX W HCPCS

## 2023-10-16 PROCEDURE — 99285 EMERGENCY DEPT VISIT HI MDM: CPT

## 2023-10-16 PROCEDURE — 85610 PROTHROMBIN TIME: CPT

## 2023-10-16 PROCEDURE — 71045 X-RAY EXAM CHEST 1 VIEW: CPT

## 2023-10-16 PROCEDURE — 99214 OFFICE O/P EST MOD 30 MIN: CPT | Performed by: INTERNAL MEDICINE

## 2023-10-16 PROCEDURE — 83880 ASSAY OF NATRIURETIC PEPTIDE: CPT

## 2023-10-16 PROCEDURE — 1123F ACP DISCUSS/DSCN MKR DOCD: CPT | Performed by: INTERNAL MEDICINE

## 2023-10-16 PROCEDURE — 2060000000 HC ICU INTERMEDIATE R&B

## 2023-10-16 PROCEDURE — G8417 CALC BMI ABV UP PARAM F/U: HCPCS | Performed by: INTERNAL MEDICINE

## 2023-10-16 PROCEDURE — 80053 COMPREHEN METABOLIC PANEL: CPT

## 2023-10-16 PROCEDURE — 84443 ASSAY THYROID STIM HORMONE: CPT

## 2023-10-16 PROCEDURE — 93010 ELECTROCARDIOGRAM REPORT: CPT | Performed by: INTERNAL MEDICINE

## 2023-10-16 PROCEDURE — 84484 ASSAY OF TROPONIN QUANT: CPT

## 2023-10-16 PROCEDURE — 1090F PRES/ABSN URINE INCON ASSESS: CPT | Performed by: INTERNAL MEDICINE

## 2023-10-16 RX ORDER — ENOXAPARIN SODIUM 100 MG/ML
30 INJECTION SUBCUTANEOUS DAILY
Status: DISCONTINUED | OUTPATIENT
Start: 2023-10-16 | End: 2023-10-16

## 2023-10-16 RX ORDER — POLYETHYLENE GLYCOL 3350 17 G/17G
17 POWDER, FOR SOLUTION ORAL DAILY PRN
Status: DISCONTINUED | OUTPATIENT
Start: 2023-10-16 | End: 2023-10-22 | Stop reason: HOSPADM

## 2023-10-16 RX ORDER — SODIUM CHLORIDE 9 MG/ML
INJECTION, SOLUTION INTRAVENOUS PRN
Status: DISCONTINUED | OUTPATIENT
Start: 2023-10-16 | End: 2023-10-22 | Stop reason: HOSPADM

## 2023-10-16 RX ORDER — ONDANSETRON 4 MG/1
4 TABLET, ORALLY DISINTEGRATING ORAL EVERY 8 HOURS PRN
Status: DISCONTINUED | OUTPATIENT
Start: 2023-10-16 | End: 2023-10-22 | Stop reason: HOSPADM

## 2023-10-16 RX ORDER — ONDANSETRON 2 MG/ML
4 INJECTION INTRAMUSCULAR; INTRAVENOUS EVERY 6 HOURS PRN
Status: DISCONTINUED | OUTPATIENT
Start: 2023-10-16 | End: 2023-10-22 | Stop reason: HOSPADM

## 2023-10-16 RX ORDER — ACETAMINOPHEN 325 MG/1
650 TABLET ORAL EVERY 6 HOURS PRN
Status: DISCONTINUED | OUTPATIENT
Start: 2023-10-16 | End: 2023-10-22 | Stop reason: HOSPADM

## 2023-10-16 RX ORDER — OXYBUTYNIN CHLORIDE 5 MG/1
5 TABLET ORAL 3 TIMES DAILY PRN
Status: DISCONTINUED | OUTPATIENT
Start: 2023-10-16 | End: 2023-10-22 | Stop reason: HOSPADM

## 2023-10-16 RX ORDER — PANTOPRAZOLE SODIUM 40 MG/1
40 TABLET, DELAYED RELEASE ORAL
Status: DISCONTINUED | OUTPATIENT
Start: 2023-10-17 | End: 2023-10-22 | Stop reason: HOSPADM

## 2023-10-16 RX ORDER — FUROSEMIDE 10 MG/ML
20 INJECTION INTRAMUSCULAR; INTRAVENOUS DAILY
Status: DISCONTINUED | OUTPATIENT
Start: 2023-10-16 | End: 2023-10-16

## 2023-10-16 RX ORDER — ACETAMINOPHEN 650 MG/1
650 SUPPOSITORY RECTAL EVERY 6 HOURS PRN
Status: DISCONTINUED | OUTPATIENT
Start: 2023-10-16 | End: 2023-10-22 | Stop reason: HOSPADM

## 2023-10-16 RX ORDER — SODIUM CHLORIDE 0.9 % (FLUSH) 0.9 %
5-40 SYRINGE (ML) INJECTION EVERY 12 HOURS SCHEDULED
Status: DISCONTINUED | OUTPATIENT
Start: 2023-10-16 | End: 2023-10-22 | Stop reason: HOSPADM

## 2023-10-16 RX ORDER — PRAVASTATIN SODIUM 10 MG
20 TABLET ORAL NIGHTLY
Status: DISCONTINUED | OUTPATIENT
Start: 2023-10-17 | End: 2023-10-22 | Stop reason: HOSPADM

## 2023-10-16 RX ORDER — SODIUM CHLORIDE 0.9 % (FLUSH) 0.9 %
5-40 SYRINGE (ML) INJECTION PRN
Status: DISCONTINUED | OUTPATIENT
Start: 2023-10-16 | End: 2023-10-22 | Stop reason: HOSPADM

## 2023-10-16 RX ADMIN — SODIUM CHLORIDE, PRESERVATIVE FREE 10 ML: 5 INJECTION INTRAVENOUS at 19:51

## 2023-10-16 RX ADMIN — FUROSEMIDE 5 MG/HR: 10 INJECTION, SOLUTION INTRAMUSCULAR; INTRAVENOUS at 16:31

## 2023-10-16 ASSESSMENT — ENCOUNTER SYMPTOMS
COUGH: 1
SHORTNESS OF BREATH: 1

## 2023-10-16 ASSESSMENT — PAIN - FUNCTIONAL ASSESSMENT: PAIN_FUNCTIONAL_ASSESSMENT: NONE - DENIES PAIN

## 2023-10-16 NOTE — H&P
Diet: ADULT DIET; Regular;  Low Sodium (2 gm); 2000 ml  Code Status: Full Code    Ly Lopez PA-C  10/16/23

## 2023-10-16 NOTE — PROGRESS NOTES
accuracy of patient problem list and past encounter diagnosis. Plan:  All meds reviewed she is on Furosemide 40mg daily but not effective   Current medications reviewed. Refills given as warranted. I think you need to be admitted to hospital to help get rid of all the extra fluid. Call placed to transfer center and case discussed with Dr QUETA HERNANDEZ [place] [facility]. Dr Simone Younger has accepted the patient  Patient is agreeable and Christ Velasquez RN will inform her niece on phone. This note is scribed in the presence of Dr. Juana Parker MD by Mateo Power RN.    I, Dr. Donita Garza, personally performed the services described in this documentation, as scribed by the above signed scribe in my presence. It is both accurate and complete to my knowledge. I agree with the details independently gathered by the clinical support staff, while the remaining scribed note accurately describes my personal service to the patient. QUALITY MEASURES  1. Tobacco Cessation Counseling: NA  2. Retake of BP if >140/90:   NA  3. Documentation to PCP/referring for new patient:  Sent to PCP at close of office visit  4. CAD patient on anti-platelet: anticoagulated  5. CAD patient on STATIN therapy:  Yes  6.  Patient with CHF and aFib on anticoagulation:  Yes         Juana Parker MD

## 2023-10-16 NOTE — ED PROVIDER NOTES
AV junctional bradycardia            DISPOSITION/PLAN   DISPOSITION Decision To Admit 10/16/2023 02:03:20 PM      PATIENT REFERRED TO:  No follow-up provider specified. DISCHARGE MEDICATIONS:  New Prescriptions    No medications on file       Controlled Substances Monitoring  RX Monitoring Attestation Periodic Controlled Substance Monitoring   6/21/2017  10:49 AM The Prescription Monitoring Report for this patient was reviewed today. No signs of potential drug abuse or diversion identified. ;Existing medication contract           (Please note that portions of this note were completed with a voice recognition program.  Efforts were made to edit the dictations but occasionally words are mis-transcribed.)    Patient was advised to return to the Emergency Department if there was any worsening.     Rosibel Marie MD (electronically signed)  Attending Emergency Physician            Rosibel Marie MD  10/16/23 1464

## 2023-10-16 NOTE — ED NOTES
Pt assisted to restroom and then to EMS stretcher for transport. Pt left ED with all personal belongings.      Rj Jennings RN  10/16/23 8795

## 2023-10-16 NOTE — FLOWSHEET NOTE
10/16/23 1630   Vital Signs   Orthostatic B/P and Pulse? Yes   Blood Pressure Lying 110/41   Pulse Lying 48 PER MINUTE   Blood Pressure Sitting 106/58   Pulse Sitting 50 PER MINUTE   Blood Pressure Standing 114/63   Pulse Standing 54 PER MINUTE       Orthostatics done, patient denies dizziness.

## 2023-10-17 PROBLEM — R00.1 AV JUNCTIONAL BRADYCARDIA: Status: ACTIVE | Noted: 2023-10-17

## 2023-10-17 PROBLEM — I50.31 ACUTE DIASTOLIC HEART FAILURE (HCC): Status: ACTIVE | Noted: 2023-10-17

## 2023-10-17 LAB
ANION GAP SERPL CALCULATED.3IONS-SCNC: 13 MMOL/L (ref 3–16)
BASOPHILS # BLD: 0 K/UL (ref 0–0.2)
BASOPHILS NFR BLD: 0.6 %
BUN SERPL-MCNC: 22 MG/DL (ref 7–20)
CALCIUM SERPL-MCNC: 9.1 MG/DL (ref 8.3–10.6)
CHLORIDE SERPL-SCNC: 99 MMOL/L (ref 99–110)
CHOLEST SERPL-MCNC: 109 MG/DL (ref 0–199)
CO2 SERPL-SCNC: 28 MMOL/L (ref 21–32)
CREAT SERPL-MCNC: 1.2 MG/DL (ref 0.6–1.2)
DEPRECATED RDW RBC AUTO: 14.5 % (ref 12.4–15.4)
EOSINOPHIL # BLD: 0.3 K/UL (ref 0–0.6)
EOSINOPHIL NFR BLD: 4.3 %
GFR SERPLBLD CREATININE-BSD FMLA CKD-EPI: 41 ML/MIN/{1.73_M2}
GLUCOSE SERPL-MCNC: 135 MG/DL (ref 70–99)
HCT VFR BLD AUTO: 31 % (ref 36–48)
HDLC SERPL-MCNC: 58 MG/DL (ref 40–60)
HGB BLD-MCNC: 10.8 G/DL (ref 12–16)
LDLC SERPL CALC-MCNC: 38 MG/DL
LYMPHOCYTES # BLD: 1 K/UL (ref 1–5.1)
LYMPHOCYTES NFR BLD: 14.8 %
MAGNESIUM SERPL-MCNC: 2 MG/DL (ref 1.8–2.4)
MCH RBC QN AUTO: 30.5 PG (ref 26–34)
MCHC RBC AUTO-ENTMCNC: 34.7 G/DL (ref 31–36)
MCV RBC AUTO: 87.7 FL (ref 80–100)
MONOCYTES # BLD: 0.7 K/UL (ref 0–1.3)
MONOCYTES NFR BLD: 10 %
NEUTROPHILS # BLD: 4.6 K/UL (ref 1.7–7.7)
NEUTROPHILS NFR BLD: 70.3 %
PLATELET # BLD AUTO: 179 K/UL (ref 135–450)
PMV BLD AUTO: 7.5 FL (ref 5–10.5)
POTASSIUM SERPL-SCNC: 3.6 MMOL/L (ref 3.5–5.1)
RBC # BLD AUTO: 3.54 M/UL (ref 4–5.2)
SODIUM SERPL-SCNC: 140 MMOL/L (ref 136–145)
TRIGL SERPL-MCNC: 67 MG/DL (ref 0–150)
VLDLC SERPL CALC-MCNC: 13 MG/DL
WBC # BLD AUTO: 6.5 K/UL (ref 4–11)

## 2023-10-17 PROCEDURE — 85025 COMPLETE CBC W/AUTO DIFF WBC: CPT

## 2023-10-17 PROCEDURE — 2060000000 HC ICU INTERMEDIATE R&B

## 2023-10-17 PROCEDURE — 6360000002 HC RX W HCPCS

## 2023-10-17 PROCEDURE — 93306 TTE W/DOPPLER COMPLETE: CPT

## 2023-10-17 PROCEDURE — 99233 SBSQ HOSP IP/OBS HIGH 50: CPT | Performed by: NURSE PRACTITIONER

## 2023-10-17 PROCEDURE — 80061 LIPID PANEL: CPT

## 2023-10-17 PROCEDURE — 93005 ELECTROCARDIOGRAM TRACING: CPT | Performed by: NURSE PRACTITIONER

## 2023-10-17 PROCEDURE — 83735 ASSAY OF MAGNESIUM: CPT

## 2023-10-17 PROCEDURE — 99233 SBSQ HOSP IP/OBS HIGH 50: CPT | Performed by: INTERNAL MEDICINE

## 2023-10-17 PROCEDURE — 80048 BASIC METABOLIC PNL TOTAL CA: CPT

## 2023-10-17 PROCEDURE — 2580000003 HC RX 258

## 2023-10-17 PROCEDURE — 36415 COLL VENOUS BLD VENIPUNCTURE: CPT

## 2023-10-17 PROCEDURE — 6370000000 HC RX 637 (ALT 250 FOR IP)

## 2023-10-17 RX ADMIN — PANTOPRAZOLE SODIUM 40 MG: 40 TABLET, DELAYED RELEASE ORAL at 08:40

## 2023-10-17 RX ADMIN — PRAVASTATIN SODIUM 20 MG: 10 TABLET ORAL at 19:58

## 2023-10-17 RX ADMIN — FUROSEMIDE 5 MG/HR: 10 INJECTION, SOLUTION INTRAMUSCULAR; INTRAVENOUS at 10:46

## 2023-10-17 RX ADMIN — RIVAROXABAN 20 MG: 20 TABLET, FILM COATED ORAL at 19:24

## 2023-10-17 ASSESSMENT — ENCOUNTER SYMPTOMS
SHORTNESS OF BREATH: 1
GASTROINTESTINAL NEGATIVE: 1

## 2023-10-17 NOTE — CARE COORDINATION
Attempted to contact family to help with assessment questions. Patient is hard of hearing and apparently does not have batteries for her hearing aid. Attempted to contact Rosalee Cunha at  [phone]. The message states \"the number you have dialed is unallocated\". Left  for Romy Varma at [phone] requesting a call back for assessment questions.

## 2023-10-17 NOTE — CONSULTS
[address] [place] [address]   HEART FAILURE PROGRAM      NAME:  Danni Cobian  AGE: 80 y.o. GENDER: female  : 1926  TODAY'S DATE:  10/17/2023    Subjective:     VISIT TYPE: Evaluation / Consult    ADMIT DATE: 10/16/2023    PAST MEDICAL HISTORY:      Diagnosis Date    Atherosclerosis of native coronary artery of native heart without angina pectoris     cath  ,  (occluded RCa with patent collateral flow    CHF (congestive heart failure) ([facility])     Chronic atrial fibrillation ([address]) 2012    Closed left hip fracture ([address]) 2016    THR    COPD (chronic obstructive pulmonary disease) (formerly [facility])     DDD (degenerative disc disease), cervical     GERD without esophagitis 2001    gerd with stricture    Hyperlipidemia     Osteopenia 2011    recheck DEXA     Primary osteoarthritis involving multiple joints     Routine health maintenance     TAC , DEXA 03    Urinary incontinence     urge     HOME MEDICATIONS:  Prior to Admission medications    Medication Sig Start Date End Date Taking? Authorizing Provider   doxycycline hyclate (VIBRA-TABS) 100 MG tablet Take 1 tablet by mouth 2 times daily for 10 days 10/9/23 10/19/23  TYLER Goodman CNP   dilTIAZem [facility]) 240 MG extended release capsule Take 1 capsule by mouth daily  Patient not taking: Reported on 10/16/2023 9/19/23   Vincent Mahmood MD   magnesium oxide (MAG-OX) 400 (240 Mg) MG tablet TAKE ONE (1) TABLET BY MOUTH EVERY DAY 23   TYLER Goodman CNP   isosorbide mononitrate (IMDUR) 30 MG extended release tablet Take 1 tablet by mouth daily  Patient not taking: Reported on 10/16/2023 8/23/23   Vincent Mahmood MD   nitroGLYCERIN (NITROSTAT) 0.4 MG SL tablet USE 1 TABLET UNDER TONGUE AS NEEDED FOR CHEST PAIN MAY REPEAT EVERY 5M IN. UP TO 3.  THEN GO TO ER  Patient not taking: Reported on 10/16/2023 8/22/23   Vincent Mahmood MD   rivaroxaban (XARELTO) 20 MG TABS tablet TAKE ONE (1) TABLET BY MOUTH EVERY DAY
mucosa or tongue. Neck: Trachea midline. Neck supple ,+Jugular venous pressure elevation    Lungs:   Wheeze and rhonchi no rales. Chest Wall:  No deformity or tenderness to palpation   Heart:  Irregular rate and rhythm, bradycardic. , normal S1, normal S2, no murmur, no rub, no S3/S4, PMI non-displaced. Abdomen:   Soft, non-tender, with normoactive bowel sounds. No masses, no hepatosplenomegaly   Extremities: No cyanosis, clubbing or pitting edema. Vascular: 2+ radial Brisk carotid upstrokes without carotid bruit. Skin: Skin color, texture, turgor are normal with no rashes or ulceration. Pysch: Euthymic mood, appropriate affect   Neurologic: Oriented to person, place and time. No slurred speech or facial asymmetry. No motor or sensory deficits on gross examination.          Labs:   CBC:   Lab Results   Component Value Date/Time    WBC 6.1 10/16/2023 01:58 PM    RBC 3.61 10/16/2023 01:58 PM    HGB 10.6 10/16/2023 01:58 PM    HCT 31.5 10/16/2023 01:58 PM    MCV 87.2 10/16/2023 01:58 PM    RDW 14.7 10/16/2023 01:58 PM     10/16/2023 01:58 PM     CMP:  Lab Results   Component Value Date/Time     10/16/2023 01:58 PM    K 3.8 10/16/2023 01:58 PM    K 3.8 07/11/2023 07:41 PM     10/16/2023 01:58 PM    CO2 28 10/16/2023 01:58 PM    BUN 19 10/16/2023 01:58 PM    CREATININE 1.0 10/16/2023 01:58 PM    GFRAA >60 02/25/2022 10:18 AM    GFRAA >60 09/10/2012 11:08 AM    AGRATIO 1.6 10/16/2023 01:58 PM    LABGLOM 51 10/16/2023 01:58 PM    GLUCOSE 122 10/16/2023 01:58 PM    PROT 6.5 10/16/2023 01:58 PM    PROT 6.5 04/18/2011 11:37 AM    CALCIUM 9.6 10/16/2023 01:58 PM    BILITOT 0.5 10/16/2023 01:58 PM    ALKPHOS 71 10/16/2023 01:58 PM    AST 17 10/16/2023 01:58 PM    ALT 9 10/16/2023 01:58 PM     PT/INR:  No results found for: \"PTINR\"  HgBA1c:  Lab Results   Component Value Date    LABA1C 5.8 01/09/2016     Lab Results   Component Value Date    CKTOTAL 90 04/12/2012    TROPONINI <0.01 02/28/2021

## 2023-10-17 NOTE — PLAN OF CARE
Problem: Discharge Planning  Goal: Discharge to home or other facility with appropriate resources  10/17/2023 0924 by Radha Mercado RN  Outcome: Progressing  10/16/2023 1941 by Claudean Gab, RN  Outcome: Progressing     Problem: Safety - Adult  Goal: Free from fall injury  10/17/2023 0924 by Radha Mercado RN  Outcome: Progressing  10/16/2023 1941 by Claudean Gab, RN  Outcome: Progressing     Problem: ABCDS Injury Assessment  Goal: Absence of physical injury  10/16/2023 1941 by Claudean Gab, RN  Outcome: Progressing     Problem: Cardiovascular - Adult  Goal: Maintains optimal cardiac output and hemodynamic stability  Outcome: Progressing

## 2023-10-18 LAB
ANION GAP SERPL CALCULATED.3IONS-SCNC: 14 MMOL/L (ref 3–16)
ANION GAP SERPL CALCULATED.3IONS-SCNC: 15 MMOL/L (ref 3–16)
BASOPHILS # BLD: 0 K/UL (ref 0–0.2)
BASOPHILS NFR BLD: 0.6 %
BUN SERPL-MCNC: 17 MG/DL (ref 7–20)
BUN SERPL-MCNC: 19 MG/DL (ref 7–20)
CALCIUM SERPL-MCNC: 9.2 MG/DL (ref 8.3–10.6)
CALCIUM SERPL-MCNC: 9.4 MG/DL (ref 8.3–10.6)
CHLORIDE SERPL-SCNC: 95 MMOL/L (ref 99–110)
CHLORIDE SERPL-SCNC: 96 MMOL/L (ref 99–110)
CO2 SERPL-SCNC: 29 MMOL/L (ref 21–32)
CO2 SERPL-SCNC: 30 MMOL/L (ref 21–32)
CREAT SERPL-MCNC: 1 MG/DL (ref 0.6–1.2)
CREAT SERPL-MCNC: 1 MG/DL (ref 0.6–1.2)
DEPRECATED RDW RBC AUTO: 14.9 % (ref 12.4–15.4)
EKG ATRIAL RATE: 89 BPM
EKG DIAGNOSIS: NORMAL
EKG Q-T INTERVAL: 444 MS
EKG QRS DURATION: 80 MS
EKG QTC CALCULATION (BAZETT): 489 MS
EKG R AXIS: 71 DEGREES
EKG T AXIS: 19 DEGREES
EKG VENTRICULAR RATE: 73 BPM
EOSINOPHIL # BLD: 0.2 K/UL (ref 0–0.6)
EOSINOPHIL NFR BLD: 3 %
GFR SERPLBLD CREATININE-BSD FMLA CKD-EPI: 51 ML/MIN/{1.73_M2}
GFR SERPLBLD CREATININE-BSD FMLA CKD-EPI: 51 ML/MIN/{1.73_M2}
GLUCOSE SERPL-MCNC: 134 MG/DL (ref 70–99)
GLUCOSE SERPL-MCNC: 172 MG/DL (ref 70–99)
HCT VFR BLD AUTO: 37.3 % (ref 36–48)
HGB BLD-MCNC: 12.8 G/DL (ref 12–16)
LYMPHOCYTES # BLD: 0.9 K/UL (ref 1–5.1)
LYMPHOCYTES NFR BLD: 14.1 %
MAGNESIUM SERPL-MCNC: 2 MG/DL (ref 1.8–2.4)
MCH RBC QN AUTO: 29.9 PG (ref 26–34)
MCHC RBC AUTO-ENTMCNC: 34.2 G/DL (ref 31–36)
MCV RBC AUTO: 87.4 FL (ref 80–100)
MONOCYTES # BLD: 0.6 K/UL (ref 0–1.3)
MONOCYTES NFR BLD: 9.2 %
NEUTROPHILS # BLD: 4.5 K/UL (ref 1.7–7.7)
NEUTROPHILS NFR BLD: 73.1 %
PLATELET # BLD AUTO: 202 K/UL (ref 135–450)
PMV BLD AUTO: 7.6 FL (ref 5–10.5)
POTASSIUM SERPL-SCNC: 3.3 MMOL/L (ref 3.5–5.1)
POTASSIUM SERPL-SCNC: 3.5 MMOL/L (ref 3.5–5.1)
RBC # BLD AUTO: 4.27 M/UL (ref 4–5.2)
SODIUM SERPL-SCNC: 139 MMOL/L (ref 136–145)
SODIUM SERPL-SCNC: 140 MMOL/L (ref 136–145)
WBC # BLD AUTO: 6.1 K/UL (ref 4–11)

## 2023-10-18 PROCEDURE — 83735 ASSAY OF MAGNESIUM: CPT

## 2023-10-18 PROCEDURE — 99233 SBSQ HOSP IP/OBS HIGH 50: CPT | Performed by: NURSE PRACTITIONER

## 2023-10-18 PROCEDURE — 6360000002 HC RX W HCPCS

## 2023-10-18 PROCEDURE — 99233 SBSQ HOSP IP/OBS HIGH 50: CPT | Performed by: INTERNAL MEDICINE

## 2023-10-18 PROCEDURE — 93010 ELECTROCARDIOGRAM REPORT: CPT | Performed by: INTERNAL MEDICINE

## 2023-10-18 PROCEDURE — 2580000003 HC RX 258

## 2023-10-18 PROCEDURE — 6370000000 HC RX 637 (ALT 250 FOR IP): Performed by: INTERNAL MEDICINE

## 2023-10-18 PROCEDURE — 6370000000 HC RX 637 (ALT 250 FOR IP)

## 2023-10-18 PROCEDURE — 2060000000 HC ICU INTERMEDIATE R&B

## 2023-10-18 PROCEDURE — 36415 COLL VENOUS BLD VENIPUNCTURE: CPT

## 2023-10-18 PROCEDURE — 80048 BASIC METABOLIC PNL TOTAL CA: CPT

## 2023-10-18 PROCEDURE — 85025 COMPLETE CBC W/AUTO DIFF WBC: CPT

## 2023-10-18 RX ORDER — MAGNESIUM SULFATE IN WATER 40 MG/ML
2000 INJECTION, SOLUTION INTRAVENOUS PRN
Status: DISCONTINUED | OUTPATIENT
Start: 2023-10-18 | End: 2023-10-18

## 2023-10-18 RX ORDER — POTASSIUM CHLORIDE 20 MEQ/1
20 TABLET, EXTENDED RELEASE ORAL 2 TIMES DAILY WITH MEALS
Status: DISCONTINUED | OUTPATIENT
Start: 2023-10-18 | End: 2023-10-18

## 2023-10-18 RX ORDER — POTASSIUM CHLORIDE 20 MEQ/1
40 TABLET, EXTENDED RELEASE ORAL PRN
Status: DISCONTINUED | OUTPATIENT
Start: 2023-10-18 | End: 2023-10-18

## 2023-10-18 RX ORDER — POTASSIUM CHLORIDE 7.45 MG/ML
10 INJECTION INTRAVENOUS PRN
Status: DISCONTINUED | OUTPATIENT
Start: 2023-10-18 | End: 2023-10-18

## 2023-10-18 RX ADMIN — FUROSEMIDE 5 MG/HR: 10 INJECTION, SOLUTION INTRAMUSCULAR; INTRAVENOUS at 06:24

## 2023-10-18 RX ADMIN — PRAVASTATIN SODIUM 20 MG: 10 TABLET ORAL at 20:47

## 2023-10-18 RX ADMIN — RIVAROXABAN 15 MG: 15 TABLET, FILM COATED ORAL at 17:56

## 2023-10-18 RX ADMIN — PANTOPRAZOLE SODIUM 40 MG: 40 TABLET, DELAYED RELEASE ORAL at 06:24

## 2023-10-18 ASSESSMENT — ENCOUNTER SYMPTOMS
GASTROINTESTINAL NEGATIVE: 1
SHORTNESS OF BREATH: 1

## 2023-10-18 NOTE — CARE COORDINATION
Attempted to call Claritza listed as Health Care decision maker, LVM. Attempted to deidra Carlock Fitting, number not in service. Pt has no documents on file listing HPOA.

## 2023-10-18 NOTE — CARE COORDINATION
Case Management Assessment  Initial Evaluation    Date/Time of Evaluation: 10/18/2023 12:33 PM  Assessment Completed by: Dilia Delacruz    If patient is discharged prior to next notation, then this note serves as note for discharge by case management. Patient Name: Josh Butler                   YOB: 1926  Diagnosis: Acute diastolic heart failure ([address]) [I50.31]  AV junctional bradycardia [R00.1]  Acute congestive heart failure, unspecified heart failure type [facility]) [I50.9]                   Date / Time: 10/16/2023  1:35 PM    Patient Admission Status: Inpatient   Readmission Risk (Low < 19, Mod (19-27), High > 27): Readmission Risk Score: 9.5    Current PCP: TYLER No CNP  PCP verified by CM? Yes (dom)    Chart Reviewed: Yes      History Provided by: Child/Family (niece Roni Xiao)  Patient Orientation: Alert and Oriented    Patient Cognition: Alert    Hospitalization in the last 30 days (Readmission):  No    If yes, Readmission Assessment in CM Navigator will be completed. Advance Directives:      Code Status: Full Code   Patient's Primary Decision Maker is: Named in [facility]     [facility] [phone]    Discharge Planning:    Patient lives with: Alone Type of Home: House  Primary Care Giver: Self  Patient Support Systems include: Family Members   Current Financial resources: Medicare, Medicaid  Current community resources: None  Current services prior to admission: None            Current DME:              Type of Home Care services:  Housekeeping    ADLS  Prior functional level: Independent in ADLs/IADLs  Current functional level: Independent in ADLs/IADLs    PT AM-PAC:   /24  OT AM-PAC:   /24    Family can provide assistance at DC: Yes  Would you like Case Management to discuss the discharge plan with any other family members/significant others, and if so, who?  Yes Roni Xiao)  Plans to Return to Present Housing: Yes  Other

## 2023-10-18 NOTE — ACP (ADVANCE CARE PLANNING)
Advance Care Planning     General Advance Care Planning (ACP) Conversation    Date of Conversation: 10/16/2023  Conducted with: Patient with Decision Making Capacity    Healthcare Decision Maker:    Primary Decision Maker: Kvng Hopkins - Niece/Nephew - [phone]  Click here to complete Healthcare Decision Makers including selection of the Healthcare Decision Maker Relationship (ie \"Primary\"). Today we referred to ACP Clinical Specialist for assistance.     Content/Action Overview:  DECLINED ACP Conversation - will revisit periodically  Reviewed DNR/DNI and patient elects Full Code (Attempt Resuscitation)        Length of Voluntary ACP Conversation in minutes:  <16 minutes (Non-Billable)    Geraldo Go

## 2023-10-18 NOTE — PLAN OF CARE
Problem: Discharge Planning  Goal: Discharge to home or other facility with appropriate resources  10/18/2023 0826 by Erasto Cadet RN  Outcome: Progressing  10/18/2023 0036 by Henrik Romero RN  Outcome: Progressing  Flowsheets (Taken 10/18/2023 0036)  Discharge to home or other facility with appropriate resources: Identify discharge learning needs (meds, wound care, etc)     Problem: Safety - Adult  Goal: Free from fall injury  Outcome: Progressing     Problem: ABCDS Injury Assessment  Goal: Absence of physical injury  10/18/2023 0036 by Henrik Romero RN  Outcome: Progressing  Flowsheets (Taken 10/18/2023 0036)  Absence of Physical Injury: Implement safety measures based on patient assessment     Problem: Chronic Conditions and Co-morbidities  Goal: Patient's chronic conditions and co-morbidity symptoms are monitored and maintained or improved  10/18/2023 0036 by Henrik Romero RN  Outcome: Progressing  Flowsheets (Taken 10/18/2023 0036)  Care Plan - Patient's Chronic Conditions and Co-Morbidity Symptoms are Monitored and Maintained or Improved: Monitor and assess patient's chronic conditions and comorbid symptoms for stability, deterioration, or improvement     Problem: Cardiovascular - Adult  Goal: Maintains optimal cardiac output and hemodynamic stability  10/18/2023 0826 by Erasto Cadet RN  Outcome: Progressing  10/18/2023 0036 by Henrik Romero RN  Outcome: Progressing  Flowsheets (Taken 10/18/2023 0036)  Maintains optimal cardiac output and hemodynamic stability: Monitor blood pressure and heart rate  Goal: Absence of cardiac dysrhythmias or at baseline  10/18/2023 0036 by Henrik Romero RN  Outcome: Progressing  Flowsheets (Taken 10/18/2023 0036)  Absence of cardiac dysrhythmias or at baseline: Monitor cardiac rate and rhythm     Problem: Respiratory - Adult  Goal: Achieves optimal ventilation and oxygenation  10/18/2023 0826 by Erasto Cadet RN  Outcome: Progressing  10/18/2023 0036 by Connie Snyder

## 2023-10-18 NOTE — PLAN OF CARE
Problem: Discharge Planning  Goal: Discharge to home or other facility with appropriate resources  Outcome: Progressing  Flowsheets (Taken 10/18/2023 0036)  Discharge to home or other facility with appropriate resources: Identify discharge learning needs (meds, wound care, etc)     Problem: ABCDS Injury Assessment  Goal: Absence of physical injury  Outcome: Progressing  Flowsheets (Taken 10/18/2023 0036)  Absence of Physical Injury: Implement safety measures based on patient assessment     Problem: Chronic Conditions and Co-morbidities  Goal: Patient's chronic conditions and co-morbidity symptoms are monitored and maintained or improved  Outcome: Progressing  Flowsheets (Taken 10/18/2023 0036)  Care Plan - Patient's Chronic Conditions and Co-Morbidity Symptoms are Monitored and Maintained or Improved: Monitor and assess patient's chronic conditions and comorbid symptoms for stability, deterioration, or improvement     Problem: Cardiovascular - Adult  Goal: Maintains optimal cardiac output and hemodynamic stability  Outcome: Progressing  Flowsheets (Taken 10/18/2023 0036)  Maintains optimal cardiac output and hemodynamic stability: Monitor blood pressure and heart rate     Problem: Cardiovascular - Adult  Goal: Absence of cardiac dysrhythmias or at baseline  Outcome: Progressing  Flowsheets (Taken 10/18/2023 0036)  Absence of cardiac dysrhythmias or at baseline: Monitor cardiac rate and rhythm     Problem: Respiratory - Adult  Goal: Achieves optimal ventilation and oxygenation  Outcome: Progressing  Flowsheets (Taken 10/18/2023 0036)  Achieves optimal ventilation and oxygenation: Assess for changes in respiratory status     Problem: Skin/Tissue Integrity - Adult  Goal: Skin integrity remains intact  Outcome: Progressing     Problem: Musculoskeletal - Adult  Goal: Return mobility to safest level of function  Outcome: Progressing     Problem: Metabolic/Fluid and Electrolytes - Adult  Goal: Electrolytes maintained

## 2023-10-18 NOTE — PLAN OF CARE
HEART FAILURE CARE PLAN:    Comorbidities Reviewed: Yes   Patient has a past medical history of Atherosclerosis of native coronary artery of native heart without angina pectoris, CHF (congestive heart failure) ([address]), Chronic atrial fibrillation ([address]), Closed left hip fracture ([address]), COPD (chronic obstructive pulmonary disease) ([address]), DDD (degenerative disc disease), cervical, GERD without esophagitis, Hyperlipidemia, Osteopenia, Primary osteoarthritis involving multiple joints, Routine health maintenance, and Urinary incontinence. ECHOCARDIOGRAM Reviewed: Yes   Patient's Ejection Fraction (EF) is greater than 40%    Weights Reviewed: Yes   Admission weight: 68 kg (150 lb)   Wt Readings from Last 3 Encounters:   10/18/23 61.1 kg (134 lb 11.2 oz)   10/16/23 68 kg (150 lb)   10/05/23 67.2 kg (148 lb 4 oz)     Intake & Output Reviewed: Yes     Intake/Output Summary (Last 24 hours) at 10/18/2023 0831  Last data filed at 10/18/2023 6208  Gross per 24 hour   Intake 740 ml   Output 4500 ml   Net -3760 ml       Medications Reviewed: Yes   SCHEDULED HOSPITAL MEDICATIONS:   sodium chloride flush  5-40 mL IntraVENous 2 times per day    pantoprazole  40 mg Oral QAM AC    pravastatin  20 mg Oral Nightly    rivaroxaban  20 mg Oral Daily     ACE/ARB/ARNI is REQUIRED for EF </= 33% SYSTOLIC FAILURE:   ACE[de-identified] N/A  ARB[de-identified] N/A  ARNI[de-identified] N/A    Evidenced-Based Beta Blocker is REQUIRED for EF </= 67% SYSTOLIC FAILURE:   [de-identified] N/A    Diuretics:  [de-identified] Furosemide    Diet Reviewed: Yes   ADULT DIET; Regular; Low Sodium (2 gm); 2000 ml    Goal of Care Reviewed: Yes   Patient and/or Family's stated Goal of Care this Admission: reduce shortness of breath, increase activity tolerance, better understand heart failure and disease management, be more comfortable, and reduce lower extremity edema prior to discharge.

## 2023-10-19 LAB
ANION GAP SERPL CALCULATED.3IONS-SCNC: 11 MMOL/L (ref 3–16)
BASOPHILS # BLD: 0 K/UL (ref 0–0.2)
BASOPHILS NFR BLD: 0.5 %
BUN SERPL-MCNC: 22 MG/DL (ref 7–20)
CALCIUM SERPL-MCNC: 9.4 MG/DL (ref 8.3–10.6)
CHLORIDE SERPL-SCNC: 95 MMOL/L (ref 99–110)
CO2 SERPL-SCNC: 30 MMOL/L (ref 21–32)
CREAT SERPL-MCNC: 0.9 MG/DL (ref 0.6–1.2)
DEPRECATED RDW RBC AUTO: 14.9 % (ref 12.4–15.4)
EOSINOPHIL # BLD: 0.3 K/UL (ref 0–0.6)
EOSINOPHIL NFR BLD: 4.1 %
GFR SERPLBLD CREATININE-BSD FMLA CKD-EPI: 58 ML/MIN/{1.73_M2}
GLUCOSE SERPL-MCNC: 129 MG/DL (ref 70–99)
HCT VFR BLD AUTO: 36.3 % (ref 36–48)
HGB BLD-MCNC: 12 G/DL (ref 12–16)
LYMPHOCYTES # BLD: 0.9 K/UL (ref 1–5.1)
LYMPHOCYTES NFR BLD: 14.2 %
MCH RBC QN AUTO: 29.7 PG (ref 26–34)
MCHC RBC AUTO-ENTMCNC: 33.1 G/DL (ref 31–36)
MCV RBC AUTO: 89.6 FL (ref 80–100)
MONOCYTES # BLD: 0.6 K/UL (ref 0–1.3)
MONOCYTES NFR BLD: 9.8 %
NEUTROPHILS # BLD: 4.5 K/UL (ref 1.7–7.7)
NEUTROPHILS NFR BLD: 71.4 %
NT-PROBNP SERPL-MCNC: 610 PG/ML (ref 0–449)
PLATELET # BLD AUTO: 198 K/UL (ref 135–450)
PMV BLD AUTO: 7.5 FL (ref 5–10.5)
POTASSIUM SERPL-SCNC: 3.2 MMOL/L (ref 3.5–5.1)
RBC # BLD AUTO: 4.06 M/UL (ref 4–5.2)
SODIUM SERPL-SCNC: 136 MMOL/L (ref 136–145)
WBC # BLD AUTO: 6.2 K/UL (ref 4–11)

## 2023-10-19 PROCEDURE — 6370000000 HC RX 637 (ALT 250 FOR IP): Performed by: NURSE PRACTITIONER

## 2023-10-19 PROCEDURE — 6370000000 HC RX 637 (ALT 250 FOR IP): Performed by: INTERNAL MEDICINE

## 2023-10-19 PROCEDURE — 99233 SBSQ HOSP IP/OBS HIGH 50: CPT | Performed by: INTERNAL MEDICINE

## 2023-10-19 PROCEDURE — 80048 BASIC METABOLIC PNL TOTAL CA: CPT

## 2023-10-19 PROCEDURE — 6370000000 HC RX 637 (ALT 250 FOR IP)

## 2023-10-19 PROCEDURE — 97116 GAIT TRAINING THERAPY: CPT

## 2023-10-19 PROCEDURE — 85025 COMPLETE CBC W/AUTO DIFF WBC: CPT

## 2023-10-19 PROCEDURE — 97530 THERAPEUTIC ACTIVITIES: CPT

## 2023-10-19 PROCEDURE — 83880 ASSAY OF NATRIURETIC PEPTIDE: CPT

## 2023-10-19 PROCEDURE — 97166 OT EVAL MOD COMPLEX 45 MIN: CPT

## 2023-10-19 PROCEDURE — 99232 SBSQ HOSP IP/OBS MODERATE 35: CPT | Performed by: NURSE PRACTITIONER

## 2023-10-19 PROCEDURE — 2580000003 HC RX 258: Performed by: NURSE PRACTITIONER

## 2023-10-19 PROCEDURE — 2060000000 HC ICU INTERMEDIATE R&B

## 2023-10-19 PROCEDURE — 97161 PT EVAL LOW COMPLEX 20 MIN: CPT

## 2023-10-19 PROCEDURE — 36415 COLL VENOUS BLD VENIPUNCTURE: CPT

## 2023-10-19 PROCEDURE — 97535 SELF CARE MNGMENT TRAINING: CPT

## 2023-10-19 RX ORDER — FUROSEMIDE 40 MG/1
40 TABLET ORAL DAILY
Status: DISCONTINUED | OUTPATIENT
Start: 2023-10-19 | End: 2023-10-22 | Stop reason: HOSPADM

## 2023-10-19 RX ORDER — DOCUSATE SODIUM 100 MG/1
100 CAPSULE, LIQUID FILLED ORAL 2 TIMES DAILY
Status: DISCONTINUED | OUTPATIENT
Start: 2023-10-19 | End: 2023-10-22 | Stop reason: HOSPADM

## 2023-10-19 RX ORDER — POTASSIUM CHLORIDE 750 MG/1
40 TABLET, EXTENDED RELEASE ORAL ONCE
Status: COMPLETED | OUTPATIENT
Start: 2023-10-19 | End: 2023-10-19

## 2023-10-19 RX ADMIN — SODIUM CHLORIDE, PRESERVATIVE FREE 10 ML: 5 INJECTION INTRAVENOUS at 21:19

## 2023-10-19 RX ADMIN — FUROSEMIDE 40 MG: 40 TABLET ORAL at 10:11

## 2023-10-19 RX ADMIN — SODIUM CHLORIDE, PRESERVATIVE FREE 10 ML: 5 INJECTION INTRAVENOUS at 10:10

## 2023-10-19 RX ADMIN — POTASSIUM CHLORIDE 40 MEQ: 750 TABLET, EXTENDED RELEASE ORAL at 10:11

## 2023-10-19 RX ADMIN — RIVAROXABAN 15 MG: 15 TABLET, FILM COATED ORAL at 17:52

## 2023-10-19 RX ADMIN — PANTOPRAZOLE SODIUM 40 MG: 40 TABLET, DELAYED RELEASE ORAL at 06:25

## 2023-10-19 RX ADMIN — PRAVASTATIN SODIUM 20 MG: 10 TABLET ORAL at 21:19

## 2023-10-19 RX ADMIN — DOCUSATE SODIUM 100 MG: 100 CAPSULE, LIQUID FILLED ORAL at 14:06

## 2023-10-19 RX ADMIN — DOCUSATE SODIUM 100 MG: 100 CAPSULE, LIQUID FILLED ORAL at 21:19

## 2023-10-19 ASSESSMENT — ENCOUNTER SYMPTOMS
GASTROINTESTINAL NEGATIVE: 1
SHORTNESS OF BREATH: 1

## 2023-10-19 NOTE — PLAN OF CARE
Problem: Discharge Planning  Goal: Discharge to home or other facility with appropriate resources  Outcome: Progressing     Problem: Safety - Adult  Goal: Free from fall injury  Outcome: Progressing     Problem: ABCDS Injury Assessment  Goal: Absence of physical injury  Outcome: Progressing     Problem: Chronic Conditions and Co-morbidities  Goal: Patient's chronic conditions and co-morbidity symptoms are monitored and maintained or improved  Outcome: Progressing     Problem: Cardiovascular - Adult  Goal: Maintains optimal cardiac output and hemodynamic stability  Outcome: Progressing  Goal: Absence of cardiac dysrhythmias or at baseline  Outcome: Progressing     Problem: Skin/Tissue Integrity  Goal: Absence of new skin breakdown  Description: 1. Monitor for areas of redness and/or skin breakdown  2. Assess vascular access sites hourly  3. Every 4-6 hours minimum:  Change oxygen saturation probe site  4. Every 4-6 hours:  If on nasal continuous positive airway pressure, respiratory therapy assess nares and determine need for appliance change or resting period.   Outcome: Progressing     Problem: Respiratory - Adult  Goal: Achieves optimal ventilation and oxygenation  Outcome: Progressing     Problem: Skin/Tissue Integrity - Adult  Goal: Skin integrity remains intact  Outcome: Progressing     Problem: Musculoskeletal - Adult  Goal: Return mobility to safest level of function  Outcome: Progressing     Problem: Metabolic/Fluid and Electrolytes - Adult  Goal: Electrolytes maintained within normal limits  Outcome: Progressing  Goal: Hemodynamic stability and optimal renal function maintained  Outcome: Progressing

## 2023-10-19 NOTE — FLOWSHEET NOTE
10/18/23 2045   Assessment   Charting Type Shift assessment   Psychosocial   Psychosocial (WDL) WDL   Neurological   Neuro (WDL) WDL   Level of Consciousness 0   [place] Coma Scale   Eye Opening 4   Best Verbal Response 5   Best Motor Response 6   [place] Coma Scale Score 15   Respiratory   Respiratory (WDL) X   Respiratory Pattern Regular   Respiratory Depth Normal   Respiratory Quality/Effort Unlabored   L Breath Sounds Diminished   R Breath Sounds Diminished   Cardiac   Cardiac (WDL) X   Cardiac Regularity Irregular   Cardiac Rhythm Atrial fib   Gastrointestinal   Abdominal (WDL) WDL   Genitourinary   Genitourinary (WDL) WDL   Peripheral Vascular   Peripheral Vascular (WDL) WDL   Edema Right lower extremity; Left lower extremity   RLE Edema Pitting;+2   LLE Edema Pitting;+2   Skin Integumentary    Skin Integumentary (WDL) X   Skin Color Pale   Skin Condition/Temp Dry; Warm   Skin Integrity Ecchymosis;Abrasion   Location scattered   Nails WDL   Musculoskeletal   Musculoskeletal (WDL) X  (gen weakness)     Shift assessment completed. Patient awake in bed. A/Ox4. See flowsheet for vitals. Scheduled PM medications given per MAR. Patient denies any pain or any further needs at this time. Call light and bedside table within reach. DVT ppx: sub q hep q8hrs, oob - HIV +   - Follow Dr. Hernesto Martinez (ID)  - Call Dr. Martinez AM for c/s   - monitor - K 5.8   - Will get HD Am  - s/p lokelma   - s/p Calcium gluconate   - will repeat BMP  - monitor

## 2023-10-19 NOTE — ACP (ADVANCE CARE PLANNING)
Met with Pt for Advance Directives consult. Pt wanting to take copy home to read over before completing. Left copy of AD forms with Pt, explained forms to her, and informed her of 's continued availability to help complete when ready, even after discharge. No other needs at this time.     Catrina Solorzano

## 2023-10-19 NOTE — PLAN OF CARE
HEART FAILURE CARE PLAN:    Comorbidities Reviewed: Yes   Patient has a past medical history of Atherosclerosis of native coronary artery of native heart without angina pectoris, CHF (congestive heart failure) ([address]), Chronic atrial fibrillation ([address]), Closed left hip fracture ([address]), COPD (chronic obstructive pulmonary disease) ([address]), DDD (degenerative disc disease), cervical, GERD without esophagitis, Hyperlipidemia, Osteopenia, Primary osteoarthritis involving multiple joints, Routine health maintenance, and Urinary incontinence. ECHOCARDIOGRAM Reviewed: Yes   Patient's Ejection Fraction (EF) is greater than 40%    Weights Reviewed: Yes   Admission weight: 68 kg (150 lb)   Wt Readings from Last 3 Encounters:   10/18/23 61.1 kg (134 lb 11.2 oz)   10/16/23 68 kg (150 lb)   10/05/23 67.2 kg (148 lb 4 oz)     Intake & Output Reviewed: Yes     Intake/Output Summary (Last 24 hours) at 10/19/2023 0518  Last data filed at 10/19/2023 0156  Gross per 24 hour   Intake 680 ml   Output 2250 ml   Net -1570 ml     Medications Reviewed: Yes   SCHEDULED HOSPITAL MEDICATIONS:   rivaroxaban  15 mg Oral Daily    sodium chloride flush  5-40 mL IntraVENous 2 times per day    pantoprazole  40 mg Oral QAM AC    pravastatin  20 mg Oral Nightly     ACE/ARB/ARNI is REQUIRED for EF </= 17% SYSTOLIC FAILURE:   ACE[de-identified] None  ARB[de-identified] None  ARNI[de-identified] None    Evidenced-Based Beta Blocker is REQUIRED for EF </= 83% SYSTOLIC FAILURE:   [de-identified] None    Diuretics:  [de-identified] Furosemide    Diet Reviewed: Yes   ADULT DIET; Regular; Low Sodium (2 gm); 2000 ml    Goal of Care Reviewed: Yes   Patient and/or Family's stated Goal of Care this Admission: reduce shortness of breath, increase activity tolerance, better understand heart failure and disease management, be more comfortable, and reduce lower extremity edema prior to discharge.

## 2023-10-20 LAB
ANION GAP SERPL CALCULATED.3IONS-SCNC: 12 MMOL/L (ref 3–16)
BUN SERPL-MCNC: 25 MG/DL (ref 7–20)
CALCIUM SERPL-MCNC: 9.4 MG/DL (ref 8.3–10.6)
CHLORIDE SERPL-SCNC: 100 MMOL/L (ref 99–110)
CO2 SERPL-SCNC: 26 MMOL/L (ref 21–32)
CREAT SERPL-MCNC: 0.8 MG/DL (ref 0.6–1.2)
GFR SERPLBLD CREATININE-BSD FMLA CKD-EPI: >60 ML/MIN/{1.73_M2}
GLUCOSE SERPL-MCNC: 122 MG/DL (ref 70–99)
POTASSIUM SERPL-SCNC: 3.7 MMOL/L (ref 3.5–5.1)
SODIUM SERPL-SCNC: 138 MMOL/L (ref 136–145)

## 2023-10-20 PROCEDURE — 2580000003 HC RX 258: Performed by: NURSE PRACTITIONER

## 2023-10-20 PROCEDURE — 80048 BASIC METABOLIC PNL TOTAL CA: CPT

## 2023-10-20 PROCEDURE — 6370000000 HC RX 637 (ALT 250 FOR IP): Performed by: NURSE PRACTITIONER

## 2023-10-20 PROCEDURE — 6370000000 HC RX 637 (ALT 250 FOR IP): Performed by: INTERNAL MEDICINE

## 2023-10-20 PROCEDURE — 36415 COLL VENOUS BLD VENIPUNCTURE: CPT

## 2023-10-20 PROCEDURE — 99232 SBSQ HOSP IP/OBS MODERATE 35: CPT | Performed by: INTERNAL MEDICINE

## 2023-10-20 PROCEDURE — 6370000000 HC RX 637 (ALT 250 FOR IP)

## 2023-10-20 PROCEDURE — 2060000000 HC ICU INTERMEDIATE R&B

## 2023-10-20 RX ORDER — PSEUDOEPHEDRINE HCL 30 MG
100 TABLET ORAL 2 TIMES DAILY PRN
COMMUNITY
Start: 2023-10-20

## 2023-10-20 RX ORDER — DOXYCYCLINE HYCLATE 100 MG
100 TABLET ORAL 2 TIMES DAILY
Qty: 20 TABLET | Refills: 0 | Status: SHIPPED
Start: 2023-10-20 | End: 2023-10-22 | Stop reason: HOSPADM

## 2023-10-20 RX ADMIN — POLYETHYLENE GLYCOL (3350) 17 G: 17 POWDER, FOR SOLUTION ORAL at 17:34

## 2023-10-20 RX ADMIN — PANTOPRAZOLE SODIUM 40 MG: 40 TABLET, DELAYED RELEASE ORAL at 06:28

## 2023-10-20 RX ADMIN — RIVAROXABAN 15 MG: 15 TABLET, FILM COATED ORAL at 17:02

## 2023-10-20 RX ADMIN — SODIUM CHLORIDE, PRESERVATIVE FREE 10 ML: 5 INJECTION INTRAVENOUS at 21:08

## 2023-10-20 RX ADMIN — METOPROLOL TARTRATE 25 MG: 25 TABLET, FILM COATED ORAL at 09:07

## 2023-10-20 RX ADMIN — FUROSEMIDE 40 MG: 40 TABLET ORAL at 08:34

## 2023-10-20 RX ADMIN — METOPROLOL TARTRATE 25 MG: 25 TABLET, FILM COATED ORAL at 21:05

## 2023-10-20 RX ADMIN — DOCUSATE SODIUM 100 MG: 100 CAPSULE, LIQUID FILLED ORAL at 08:34

## 2023-10-20 RX ADMIN — SODIUM CHLORIDE, PRESERVATIVE FREE 10 ML: 5 INJECTION INTRAVENOUS at 08:34

## 2023-10-20 RX ADMIN — PRAVASTATIN SODIUM 20 MG: 10 TABLET ORAL at 21:05

## 2023-10-20 RX ADMIN — DOCUSATE SODIUM 100 MG: 100 CAPSULE, LIQUID FILLED ORAL at 21:05

## 2023-10-20 NOTE — PLAN OF CARE
HEART FAILURE CARE PLAN:    Comorbidities Reviewed: Yes   Patient has a past medical history of Atherosclerosis of native coronary artery of native heart without angina pectoris, CHF (congestive heart failure) ([address]), Chronic atrial fibrillation ([address]), Closed left hip fracture ([address]), COPD (chronic obstructive pulmonary disease) ([address]), DDD (degenerative disc disease), cervical, GERD without esophagitis, Hyperlipidemia, Osteopenia, Primary osteoarthritis involving multiple joints, Routine health maintenance, and Urinary incontinence. ECHOCARDIOGRAM Reviewed: Yes   Patient's Ejection Fraction (EF) is greater than 40%    Weights Reviewed: Yes   Admission weight: 68 kg (150 lb)   Wt Readings from Last 3 Encounters:   10/19/23 60.1 kg (132 lb 8 oz)   10/16/23 68 kg (150 lb)   10/05/23 67.2 kg (148 lb 4 oz)     Intake & Output Reviewed: Yes     Intake/Output Summary (Last 24 hours) at 10/20/2023 0157  Last data filed at 10/19/2023 2337  Gross per 24 hour   Intake 740 ml   Output 700 ml   Net 40 ml     Medications Reviewed: Yes   SCHEDULED HOSPITAL MEDICATIONS:   furosemide  40 mg Oral Daily    docusate sodium  100 mg Oral BID    rivaroxaban  15 mg Oral Daily    sodium chloride flush  5-40 mL IntraVENous 2 times per day    pantoprazole  40 mg Oral QAM AC    pravastatin  20 mg Oral Nightly     ACE/ARB/ARNI is REQUIRED for EF </= 44% SYSTOLIC FAILURE:   ACE[de-identified] None  ARB[de-identified] None  ARNI[de-identified] None    Evidenced-Based Beta Blocker is REQUIRED for EF </= 25% SYSTOLIC FAILURE:   [de-identified] None    Diuretics:  [de-identified] Furosemide    Diet Reviewed: Yes   ADULT DIET; Regular; Low Sodium (2 gm); 2000 ml    Goal of Care Reviewed: Yes   Patient and/or Family's stated Goal of Care this Admission: reduce shortness of breath, increase activity tolerance, better understand heart failure and disease management, be more comfortable, and reduce lower extremity edema prior to discharge.

## 2023-10-20 NOTE — FLOWSHEET NOTE
10/19/23 2114   Assessment   Charting Type Shift assessment   Psychosocial   Psychosocial (WDL) WDL   Neurological   Neuro (WDL) WDL   Level of Consciousness 0   [place] Coma Scale   Eye Opening 4   Best Verbal Response 5   Best Motor Response 6   [place] Coma Scale Score 15   Respiratory   Respiratory (WDL) X   Respiratory Pattern Regular   Respiratory Depth Normal   Respiratory Quality/Effort Unlabored   L Breath Sounds Diminished   R Breath Sounds Diminished   Cardiac   Cardiac (WDL) X   Cardiac Regularity Irregular   Cardiac Rhythm Atrial fib   Gastrointestinal   Abdominal (WDL) WDL   Genitourinary   Genitourinary (WDL) WDL   Peripheral Vascular   Peripheral Vascular (WDL) WDL   Edema Right lower extremity; Left lower extremity   RLE Edema +1   LLE Edema +1   Skin Integumentary    Skin Integumentary (WDL) X   Skin Color Pale   Skin Condition/Temp Dry; Warm   Skin Integrity Ecchymosis;Abrasion   Location scattered   Musculoskeletal   Musculoskeletal (WDL) X  (gen weakness)     Shift assessment completed. Patient awake in chair. A/Ox4. See flowsheet for vitals. Scheduled PM medications given per MAR. Patient on room air. Respirations even and unlabored. No s/s of distress. Drink provided. Patient denies pain. Patient has no additional needs at this time. Call light and bedside table within reach.

## 2023-10-20 NOTE — CARE COORDINATION
Noted DC order, met with pt and family at bedside. Family states that pt lives home alone and does not have 24 hour supervision. She has no funds to pay for private caregivers. She is open to going short term to [facility]. Called and LVM for Cecilia at [address] [place] [address]., will continue to monitor. Sveta from [address] [place] [address]. returned call, she will review pt and start precert if pt is accepted. Chris Stevenson RN and Dr. Nitish Schuster both made aware. 1345 - Sveta at [address] [place] [address]. starting precert now. Pt and family aware. Pt presents to the ED c/o rt eye pain with swelling onset yesterday

## 2023-10-20 NOTE — DISCHARGE INSTR - COC
Continuity of Care Form    Patient Name: Kiran Rowe   :  1926  MRN:  [id number]    Admit date:  10/16/2023  Discharge date:  10/22/23      Code Status Order: Full Code   Advance Directives:     Admitting Physician:  Bonny Ardon MD  PCP: Ayde Hu, APRN - CNP    Discharging Nurse: Leighton Monsalve, [address] Unit/Room#: /1451-27  Discharging Unit Phone Number: [phone]    Emergency Contact:   Extended Emergency Contact Information  Primary Emergency Contact: Kameron Santiago of [address] [address] [place] Phone: [phone]  Mobile Phone: [phone]  Relation: Niece/Nephew  Secondary Emergency Contact: Valentina Gupta of [address] [place] [address] [place] Phone: [phone]  Work Phone: [phone]  Relation: Niece/Nephew    Past Surgical History:  Past Surgical History:   Procedure Laterality Date    APPENDECTOMY      CATARACT REMOVAL      CHOLECYSTECTOMY      COLONOSCOPY      Dr Whitney Kelley, COLON, DIAGNOSTIC      HYSTERECTOMY (CERVIX STATUS UNKNOWN)      JOINT REPLACEMENT  2016    left hip    JOINT REPLACEMENT Left 2016    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       Immunization History:   Immunization History   Administered Date(s) Administered    COVID-19, MODERNA BLUE border, Primary or Immunocompromised, (age 12y+), IM, 100 mcg/0.5mL 2021, 2021    Influenza 10/12/2010, 2011, 09/10/2012, 2013    Influenza Vaccine, unspecified formulation 2015, 10/12/2016    Influenza Virus Vaccine 10/01/2008, 10/30/2014    Influenza, AFLURIA (age 1 yrs+), FLUZONE, (age 10 mo+), MDV, 0.5mL 10/26/2017    Influenza, FLUAD, (age 72 y+), Adjuvanted, 0.5mL 2021, 2022, 10/05/2023    Influenza, High Dose (Fluzone 65 yrs and older) 2018    Influenza, Triv, inactivated, subunit, adjuvanted, IM (Fluad 65 yrs and older) 11/15/2019    Pneumococcal, PCV-13, PREVNAR 13, (age 6w+), IM, 0.5mL 2016

## 2023-10-20 NOTE — PLAN OF CARE
Problem: Discharge Planning  Goal: Discharge to home or other facility with appropriate resources  10/20/2023 0832 by Farrah Fontanez RN  Outcome: Progressing  10/19/2023 2311 by Cash Tapia RN  Outcome: Progressing     Problem: Safety - Adult  Goal: Free from fall injury  10/20/2023 0832 by Farrah Fontanez RN  Outcome: Progressing  10/19/2023 2311 by Cash Tapia RN  Outcome: Progressing     Problem: ABCDS Injury Assessment  Goal: Absence of physical injury  10/20/2023 0832 by Farrah Fontanez RN  Outcome: Progressing  10/19/2023 2311 by Cash Taipa RN  Outcome: Progressing     Problem: Chronic Conditions and Co-morbidities  Goal: Patient's chronic conditions and co-morbidity symptoms are monitored and maintained or improved  10/20/2023 0832 by Farrah Fontanez RN  Outcome: Progressing  10/19/2023 2311 by Cash Tapia RN  Outcome: Progressing     Problem: Cardiovascular - Adult  Goal: Maintains optimal cardiac output and hemodynamic stability  10/20/2023 0832 by Farrah Fontanez RN  Outcome: Progressing  10/19/2023 2311 by Cash Tapia RN  Outcome: Progressing  Goal: Absence of cardiac dysrhythmias or at baseline  10/20/2023 0832 by Farrah Fontanez RN  Outcome: Progressing  10/19/2023 2311 by Cash Tapia RN  Outcome: Progressing     Problem: Respiratory - Adult  Goal: Achieves optimal ventilation and oxygenation  10/20/2023 0832 by Farrah Fontanez RN  Outcome: Progressing  10/19/2023 2311 by Cash Tapia RN  Outcome: Progressing     Problem: Skin/Tissue Integrity - Adult  Goal: Skin integrity remains intact  10/20/2023 0832 by Farrah Fontanez RN  Outcome: Progressing  10/19/2023 2311 by Cash Tapia RN  Outcome: Progressing     Problem: Musculoskeletal - Adult  Goal: Return mobility to safest level of function  10/20/2023 0832 by Farrah Fontanez RN  Outcome: Progressing  10/19/2023 2311 by Cash Tapia RN  Outcome: Progressing     Problem:

## 2023-10-21 LAB
ANION GAP SERPL CALCULATED.3IONS-SCNC: 13 MMOL/L (ref 3–16)
BUN SERPL-MCNC: 31 MG/DL (ref 7–20)
CALCIUM SERPL-MCNC: 9.3 MG/DL (ref 8.3–10.6)
CHLORIDE SERPL-SCNC: 102 MMOL/L (ref 99–110)
CO2 SERPL-SCNC: 25 MMOL/L (ref 21–32)
CREAT SERPL-MCNC: 0.9 MG/DL (ref 0.6–1.2)
EKG ATRIAL RATE: 82 BPM
EKG DIAGNOSIS: NORMAL
EKG Q-T INTERVAL: 416 MS
EKG QRS DURATION: 76 MS
EKG QTC CALCULATION (BAZETT): 486 MS
EKG R AXIS: 21 DEGREES
EKG T AXIS: 5 DEGREES
EKG VENTRICULAR RATE: 82 BPM
GFR SERPLBLD CREATININE-BSD FMLA CKD-EPI: 58 ML/MIN/{1.73_M2}
GLUCOSE SERPL-MCNC: 115 MG/DL (ref 70–99)
POTASSIUM SERPL-SCNC: 4 MMOL/L (ref 3.5–5.1)
SODIUM SERPL-SCNC: 140 MMOL/L (ref 136–145)
TROPONIN, HIGH SENSITIVITY: 24 NG/L (ref 0–14)

## 2023-10-21 PROCEDURE — 6370000000 HC RX 637 (ALT 250 FOR IP): Performed by: INTERNAL MEDICINE

## 2023-10-21 PROCEDURE — 2060000000 HC ICU INTERMEDIATE R&B

## 2023-10-21 PROCEDURE — 6370000000 HC RX 637 (ALT 250 FOR IP): Performed by: NURSE PRACTITIONER

## 2023-10-21 PROCEDURE — 36415 COLL VENOUS BLD VENIPUNCTURE: CPT

## 2023-10-21 PROCEDURE — 80048 BASIC METABOLIC PNL TOTAL CA: CPT

## 2023-10-21 PROCEDURE — 99232 SBSQ HOSP IP/OBS MODERATE 35: CPT | Performed by: INTERNAL MEDICINE

## 2023-10-21 PROCEDURE — 2580000003 HC RX 258: Performed by: NURSE PRACTITIONER

## 2023-10-21 PROCEDURE — 6370000000 HC RX 637 (ALT 250 FOR IP)

## 2023-10-21 PROCEDURE — 84484 ASSAY OF TROPONIN QUANT: CPT

## 2023-10-21 PROCEDURE — 93005 ELECTROCARDIOGRAM TRACING: CPT | Performed by: INTERNAL MEDICINE

## 2023-10-21 PROCEDURE — 93010 ELECTROCARDIOGRAM REPORT: CPT | Performed by: INTERNAL MEDICINE

## 2023-10-21 RX ORDER — BISACODYL 5 MG/1
10 TABLET, DELAYED RELEASE ORAL ONCE
Status: COMPLETED | OUTPATIENT
Start: 2023-10-21 | End: 2023-10-21

## 2023-10-21 RX ADMIN — SODIUM CHLORIDE, PRESERVATIVE FREE 10 ML: 5 INJECTION INTRAVENOUS at 09:34

## 2023-10-21 RX ADMIN — METOPROLOL TARTRATE 25 MG: 25 TABLET, FILM COATED ORAL at 09:34

## 2023-10-21 RX ADMIN — RIVAROXABAN 15 MG: 15 TABLET, FILM COATED ORAL at 17:42

## 2023-10-21 RX ADMIN — PRAVASTATIN SODIUM 20 MG: 10 TABLET ORAL at 21:29

## 2023-10-21 RX ADMIN — METOPROLOL TARTRATE 25 MG: 25 TABLET, FILM COATED ORAL at 21:29

## 2023-10-21 RX ADMIN — POLYETHYLENE GLYCOL (3350) 17 G: 17 POWDER, FOR SOLUTION ORAL at 15:04

## 2023-10-21 RX ADMIN — FUROSEMIDE 40 MG: 40 TABLET ORAL at 09:34

## 2023-10-21 RX ADMIN — DOCUSATE SODIUM 100 MG: 100 CAPSULE, LIQUID FILLED ORAL at 09:34

## 2023-10-21 RX ADMIN — PANTOPRAZOLE SODIUM 40 MG: 40 TABLET, DELAYED RELEASE ORAL at 06:12

## 2023-10-21 RX ADMIN — BISACODYL 10 MG: 5 TABLET, COATED ORAL at 10:16

## 2023-10-21 RX ADMIN — DOCUSATE SODIUM 100 MG: 100 CAPSULE, LIQUID FILLED ORAL at 21:29

## 2023-10-21 RX ADMIN — SODIUM CHLORIDE, PRESERVATIVE FREE 10 ML: 5 INJECTION INTRAVENOUS at 21:31

## 2023-10-21 NOTE — DISCHARGE SUMMARY
Hospital Medicine Discharge Summary    Patient: Danni Cobian     Gender: female  : 1926   Age: 80 y.o. MRN: [id number]    Admitting Physician: Tamar Woods MD  Discharge Physician: Patti Bumpers, DO    Code Status: Full Code     Admit Date: 10/16/2023   Discharge Date: 10/20/2023      Discharge Diagnoses: Active Hospital Problems    Diagnosis Date Noted    GERD without esophagitis [K21.9]      Priority: High    Atherosclerosis of native coronary artery of native heart without angina pectoris [I25.10]      Priority: High    Chronic atrial fibrillation [I48.20]      Priority: High    Pure hypercholesterolemia [E78.00] 2016     Priority: Medium    Urinary incontinence [R32]      Priority: Medium    Acute diastolic heart failure (HCC) [I50.31] 10/17/2023    AV junctional bradycardia [R00.1] 10/17/2023    Acute congestive heart failure, unspecified heart failure type ([address]) [I50.9] 10/16/2023    Secondary hypercoagulable state ([address]) [D68.69] 2023    Late onset Alzheimer's disease without behavioral disturbance ([address]) [G30.1, F02.80] 2019    Panlobular emphysema ([address]) [J43.1]          Condition at Discharge: Stable    Hospital Course: The patient is a 80 y.o. female with PMHx alzheimer's, GERD, urianry incontience, emphysema, CAD, atrial fibrillation who presented as a direct admit from cardiology with concern for acute congestive heart failure. Pt states that she was sent here from Dr. Melvi Torres office for concern of CHF due to her SOB and BLE edema. She states that last week she developed CP but took some nitro and the pain went away. She also states that she has been taking lasix for her BLE edema but it is no longer effective.     #Acute CHF  -11lbs weight gain in 3 months, SOB, leg edema  -has been on lasix 40mg oral daily   -BNP 1,619 on admission   -CXR as above, bronchitis/mild pneumonitis is not excluded  -EKG with junctional bradycardia   -TSH wnl  -Echo shows preserved
EF  -lasix gtt  -low Na diet, strict I&Os, daily weights  -cardiology consulted  -s/p lasix drip, transitioned to PO lasix     #Junctional bradycardia   -EKG as above  -holding metoprolol, diltiazem, and Aricept  -monitor on tele  -cardiology consulted  -resolved, rates rising to 90s-100, resumed metoprolol  -cardiology recommends stopping diltiazem on discharge     #Chronic atrial fibrillation  #Secondary hypercoagulable state  -metoprolol was resumed  -AC on Xarelto     #Elevated troponin  #Hx CAD  -flat on repeat  -no active chest pain  -continue prn nitro     #Emphysema  -stable, no AE  -continue prn inhalers     #Urinary incontinence  -on oxybutynin     #GERD  -on PPI     #Alzheimer's dementia   -on home Aricept  -supportive care     Additional findings or notes to primary provider:  None at this time    Discharge Medications:   Current Discharge Medication List        START taking these medications    Details   docusate sodium (COLACE, DULCOLAX) 100 MG CAPS Take 100 mg by mouth 2 times daily as needed for Constipation Hold for loose stools or diarrhea. Current Discharge Medication List        CONTINUE these medications which have CHANGED    Details   doxycycline hyclate (VIBRA-TABS) 100 MG tablet Take 1 tablet by mouth 2 times daily for 10 days It is ok to complete any remaining pills. Qty: 20 tablet, Refills: 0           Current Discharge Medication List        CONTINUE these medications which have NOT CHANGED    Details   magnesium oxide (MAG-OX) 400 (240 Mg) MG tablet TAKE ONE (1) TABLET BY MOUTH EVERY DAY  Qty: 30 tablet, Refills: 5    Associated Diagnoses: Muscle cramps      nitroGLYCERIN (NITROSTAT) 0.4 MG SL tablet USE 1 TABLET UNDER TONGUE AS NEEDED FOR CHEST PAIN MAY REPEAT EVERY 5M IN. UP TO 3.  THEN GO TO ER  Qty: 25 tablet, Refills: 3    Associated Diagnoses: Other chest pain      rivaroxaban (XARELTO) 20 MG TABS tablet TAKE ONE (1) TABLET BY MOUTH EVERY DAY  Qty: 90 tablet, Refills: 3

## 2023-10-21 NOTE — PLAN OF CARE
Problem: Discharge Planning  Goal: Discharge to home or other facility with appropriate resources  10/21/2023 0932 by Beni Keith RN  Outcome: Progressing  10/20/2023 2103 by Jayme Baxter RN  Outcome: Progressing     Problem: Safety - Adult  Goal: Free from fall injury  10/21/2023 0932 by Beni Keith RN  Outcome: Progressing  10/20/2023 2103 by Jayme Baxter RN  Outcome: Progressing     Problem: ABCDS Injury Assessment  Goal: Absence of physical injury  10/21/2023 0932 by Beni Keith RN  Outcome: Progressing  10/20/2023 2103 by Jayme Baxter RN  Outcome: Progressing     Problem: Chronic Conditions and Co-morbidities  Goal: Patient's chronic conditions and co-morbidity symptoms are monitored and maintained or improved  10/21/2023 0932 by Beni Keith RN  Outcome: Progressing  10/20/2023 2103 by Jayme Baxter RN  Outcome: Progressing     Problem: Cardiovascular - Adult  Goal: Maintains optimal cardiac output and hemodynamic stability  10/21/2023 0932 by Beni Keith RN  Outcome: Progressing  10/20/2023 2103 by Jayme Baxter RN  Outcome: Progressing  Goal: Absence of cardiac dysrhythmias or at baseline  10/21/2023 0932 by Beni Keith RN  Outcome: Progressing  10/20/2023 2103 by Jayme Baxter RN  Outcome: Progressing     Problem: Cardiovascular - Adult  Goal: Maintains optimal cardiac output and hemodynamic stability  10/21/2023 0932 by Beni Keith RN  Outcome: Progressing  10/20/2023 2103 by Jayem Baxter RN  Outcome: Progressing  Goal: Absence of cardiac dysrhythmias or at baseline  10/21/2023 0932 by Beni Keith RN  Outcome: Progressing  10/20/2023 2103 by Jayme Baxter RN  Outcome: Progressing     Problem: Respiratory - Adult  Goal: Achieves optimal ventilation and oxygenation  10/21/2023 0932 by Beni Keith RN  Outcome: Progressing  10/20/2023 2103 by Jayme Baxter RN  Outcome: Progressing     Problem: Skin/Tissue

## 2023-10-21 NOTE — FLOWSHEET NOTE
10/21/23 0730   Handoff   Communication Given Shift Handoff   Handoff Given To Kareem Troy RN   Handoff Received From Guillermina Gee RN   Handoff Communication Face to Face   Time Handoff Given 0730   End of Shift Check Performed Yes     EOS report given to Kareem Troy RN. Pt in bed, call light within reach. Pt is stable, care is transferred.

## 2023-10-21 NOTE — PLAN OF CARE
HEART FAILURE CARE PLAN:    Comorbidities Reviewed: Yes   Patient has a past medical history of Atherosclerosis of native coronary artery of native heart without angina pectoris, CHF (congestive heart failure) ([address]), Chronic atrial fibrillation ([address]), Closed left hip fracture ([address]), COPD (chronic obstructive pulmonary disease) ([address]), DDD (degenerative disc disease), cervical, GERD without esophagitis, Hyperlipidemia, Osteopenia, Primary osteoarthritis involving multiple joints, Routine health maintenance, and Urinary incontinence. ECHOCARDIOGRAM Reviewed: Yes   Patient's Ejection Fraction (EF) is greater than 40%    Weights Reviewed: Yes   Admission weight: 68 kg (150 lb)   Wt Readings from Last 3 Encounters:   10/20/23 60.4 kg (133 lb 4 oz)   10/16/23 68 kg (150 lb)   10/05/23 67.2 kg (148 lb 4 oz)     Intake & Output Reviewed: Yes     Intake/Output Summary (Last 24 hours) at 10/20/2023 2104  Last data filed at 10/20/2023 1901  Gross per 24 hour   Intake 550 ml   Output 900 ml   Net -350 ml     Medications Reviewed: Yes   SCHEDULED HOSPITAL MEDICATIONS:   metoprolol tartrate  25 mg Oral BID    furosemide  40 mg Oral Daily    docusate sodium  100 mg Oral BID    rivaroxaban  15 mg Oral Daily    sodium chloride flush  5-40 mL IntraVENous 2 times per day    pantoprazole  40 mg Oral QAM AC    pravastatin  20 mg Oral Nightly     ACE/ARB/ARNI is REQUIRED for EF </= 57% SYSTOLIC FAILURE:   ACE[de-identified] None  ARB[de-identified] None  ARNI[de-identified] None    Evidenced-Based Beta Blocker is REQUIRED for EF </= 36% SYSTOLIC FAILURE:   [de-identified] None    Diuretics:  [de-identified] Furosemide    Diet Reviewed: Yes   ADULT DIET; Regular; Low Sodium (2 gm); 2000 ml    Goal of Care Reviewed: Yes   Patient and/or Family's stated Goal of Care this Admission: reduce shortness of breath, increase activity tolerance, better understand heart failure and disease management, be more comfortable, and reduce lower extremity edema prior to discharge.        Problem: Discharge

## 2023-10-22 VITALS
SYSTOLIC BLOOD PRESSURE: 104 MMHG | RESPIRATION RATE: 16 BRPM | DIASTOLIC BLOOD PRESSURE: 67 MMHG | OXYGEN SATURATION: 93 % | BODY MASS INDEX: 27.41 KG/M2 | TEMPERATURE: 97.8 F | WEIGHT: 145.2 LBS | HEART RATE: 78 BPM | HEIGHT: 61 IN

## 2023-10-22 PROCEDURE — 2580000003 HC RX 258: Performed by: NURSE PRACTITIONER

## 2023-10-22 PROCEDURE — 97116 GAIT TRAINING THERAPY: CPT

## 2023-10-22 PROCEDURE — 97530 THERAPEUTIC ACTIVITIES: CPT

## 2023-10-22 PROCEDURE — 6370000000 HC RX 637 (ALT 250 FOR IP): Performed by: NURSE PRACTITIONER

## 2023-10-22 PROCEDURE — 99238 HOSP IP/OBS DSCHRG MGMT 30/<: CPT | Performed by: INTERNAL MEDICINE

## 2023-10-22 PROCEDURE — 6370000000 HC RX 637 (ALT 250 FOR IP)

## 2023-10-22 PROCEDURE — 6370000000 HC RX 637 (ALT 250 FOR IP): Performed by: INTERNAL MEDICINE

## 2023-10-22 RX ADMIN — DOCUSATE SODIUM 100 MG: 100 CAPSULE, LIQUID FILLED ORAL at 09:17

## 2023-10-22 RX ADMIN — SODIUM CHLORIDE, PRESERVATIVE FREE 10 ML: 5 INJECTION INTRAVENOUS at 09:17

## 2023-10-22 RX ADMIN — FUROSEMIDE 40 MG: 40 TABLET ORAL at 09:16

## 2023-10-22 RX ADMIN — METOPROLOL TARTRATE 25 MG: 25 TABLET, FILM COATED ORAL at 09:17

## 2023-10-22 RX ADMIN — PANTOPRAZOLE SODIUM 40 MG: 40 TABLET, DELAYED RELEASE ORAL at 05:36

## 2023-10-22 NOTE — CARE COORDINATION
DISCHARGE ORDER  Date/Time 10/22/2023 12:56 PM  Completed by: Torri Segura RN, Case Management    Patient Name: Melvina Wetzel      : 1926  Admitting Diagnosis: Acute diastolic heart failure (HCC) [I50.31]  AV junctional bradycardia [R00.1]  Acute congestive heart failure, unspecified heart failure type ([address]) [I50.9]      Admit order Date and Status:10/16/23  (verify MD's last order for status of admission)      Noted discharge order. If applicable PT/OT recommendation at Discharge: SNF  DME recommendation by PT/OT:Home with  and home PT. Confirmed discharge plan: Yes  with Esther Mcallister. Luigi Zepeda spoke to the patient's family member. Date of Last IMM Given: 10/20/23    Reviewed chart. Role of discharge planner explained and patient verbalized understanding. Discharge order is noted. Has Home O2 in place on admit:  No  Informed of need to bring portable home O2 tank on day of discharge for nursing to connect prior to leaving:   Not Indicated  Verbalized agreement/Understanding:   Not Indicated    Pt is being d/c'd to home today. Pt's O2 sats are 93% on RA. Referral was given to [facility] who will reach out to the patient in 24 to 48 hours. Discharge timeout done with Esther Mcallister. All discharge needs and concerns addressed.

## 2023-10-22 NOTE — PLAN OF CARE
HEART FAILURE CARE PLAN:    Comorbidities Reviewed: Yes   Patient has a past medical history of Atherosclerosis of native coronary artery of native heart without angina pectoris, CHF (congestive heart failure) ([address]), Chronic atrial fibrillation ([address]), Closed left hip fracture ([address]), COPD (chronic obstructive pulmonary disease) ([address]), DDD (degenerative disc disease), cervical, GERD without esophagitis, Hyperlipidemia, Osteopenia, Primary osteoarthritis involving multiple joints, Routine health maintenance, and Urinary incontinence. ECHOCARDIOGRAM Reviewed: Yes   Patient's Ejection Fraction (EF) is greater than 40%    Weights Reviewed: Yes   Admission weight: 68 kg (150 lb)   Wt Readings from Last 3 Encounters:   10/21/23 66.1 kg (145 lb 12.8 oz)   10/16/23 68 kg (150 lb)   10/05/23 67.2 kg (148 lb 4 oz)     Intake & Output Reviewed: Yes     Intake/Output Summary (Last 24 hours) at 10/21/2023 2243  Last data filed at 10/21/2023 1817  Gross per 24 hour   Intake 760 ml   Output --   Net 760 ml     Medications Reviewed: Yes   SCHEDULED HOSPITAL MEDICATIONS:   metoprolol tartrate  25 mg Oral BID    furosemide  40 mg Oral Daily    docusate sodium  100 mg Oral BID    rivaroxaban  15 mg Oral Daily    sodium chloride flush  5-40 mL IntraVENous 2 times per day    pantoprazole  40 mg Oral QAM AC    pravastatin  20 mg Oral Nightly     ACE/ARB/ARNI is REQUIRED for EF </= 69% SYSTOLIC FAILURE:   ACE[de-identified] None  ARB[de-identified] None  ARNI[de-identified] None    Evidenced-Based Beta Blocker is REQUIRED for EF </= 56% SYSTOLIC FAILURE:   [de-identified] None    Diuretics:  [de-identified] Furosemide    Diet Reviewed: Yes   ADULT DIET; Regular; Low Sodium (2 gm); 2000 ml    Goal of Care Reviewed: Yes   Patient and/or Family's stated Goal of Care this Admission: reduce shortness of breath, increase activity tolerance, better understand heart failure and disease management, be more comfortable, and reduce lower extremity edema prior to discharge.        Problem: Discharge

## 2023-10-22 NOTE — PLAN OF CARE
Problem: Discharge Planning  Goal: Discharge to home or other facility with appropriate resources  10/22/2023 1331 by Farrah Fontanez RN  Outcome: Completed  10/22/2023 0916 by Farrah Fontanez RN  Outcome: Progressing     Problem: Safety - Adult  Goal: Free from fall injury  10/22/2023 1331 by Farrah Fontanez RN  Outcome: Completed  10/22/2023 0916 by Farrah Fontanez RN  Outcome: Progressing     Problem: ABCDS Injury Assessment  Goal: Absence of physical injury  Outcome: Completed     Problem: ABCDS Injury Assessment  Goal: Absence of physical injury  Outcome: Completed     Problem: Chronic Conditions and Co-morbidities  Goal: Patient's chronic conditions and co-morbidity symptoms are monitored and maintained or improved  Outcome: Completed     Problem: Cardiovascular - Adult  Goal: Maintains optimal cardiac output and hemodynamic stability  Outcome: Completed  Goal: Absence of cardiac dysrhythmias or at baseline  Outcome: Completed     Problem: Skin/Tissue Integrity - Adult  Goal: Skin integrity remains intact  Outcome: Completed     Problem: Musculoskeletal - Adult  Goal: Return mobility to safest level of function  Outcome: Completed     Problem: Metabolic/Fluid and Electrolytes - Adult  Goal: Electrolytes maintained within normal limits  Outcome: Completed  Goal: Hemodynamic stability and optimal renal function maintained  Outcome: Completed     Problem: Skin/Tissue Integrity  Goal: Absence of new skin breakdown  Description: 1. Monitor for areas of redness and/or skin breakdown  2. Assess vascular access sites hourly  3. Every 4-6 hours minimum:  Change oxygen saturation probe site  4. Every 4-6 hours:  If on nasal continuous positive airway pressure, respiratory therapy assess nares and determine need for appliance change or resting period.   Outcome: Completed

## 2023-10-22 NOTE — PLAN OF CARE
Problem: Discharge Planning  Goal: Discharge to home or other facility with appropriate resources  10/22/2023 0916 by Estella Powell RN  Outcome: Progressing  10/21/2023 2243 by Moshe Tee RN  Outcome: Progressing     Problem: Safety - Adult  Goal: Free from fall injury  10/22/2023 0916 by Estella Powell RN  Outcome: Progressing  10/21/2023 2243 by Moshe Tee RN  Outcome: Progressing     Problem: ABCDS Injury Assessment  Goal: Absence of physical injury  10/21/2023 2243 by Moshe Tee RN  Outcome: Progressing

## 2023-10-22 NOTE — FLOWSHEET NOTE
10/22/23 0702   Handoff   Communication Given Shift Handoff   Handoff Given To Pepe Leyva RN   Handoff Received From Sal Han RN   Handoff Communication Face to Face   Time Handoff Given 4694   End of Shift Check Performed Yes     EOS report given to Pepe Leyva, [address] [place] [address]. Pt in bed, call light within reach. Pt is stable, care is transferred.

## 2023-10-23 ENCOUNTER — CARE COORDINATION (OUTPATIENT)
Dept: CASE MANAGEMENT | Age: 88
End: 2023-10-23

## 2023-10-23 NOTE — CARE COORDINATION
medications she is taking. Denies needs. Spoke with Chalino Horton who states medications are pill packed by pharmacy so she is going to take the meds to the [address] today and have them update pill packs. Chalino Horton is getting a plate of lunch ready to take to [place] now. Plans to stop at pharmacy and see if they will help her with the med changes in the pill packs. States she needs to be  for [facility] so she can have Laurent leave the door open because of [address] she doesn't always answer the phone and the door is always locked. If Chalino Horton can get a call first she can unlock door and let them in. CTN contacted Sonam at [address]. Completed med review. She will be able to help Chalino Horton identify and remove the discontinued medications and updated the med list.     CTN contacted [facility]. The referral is entered and they are going to call and schedule. They will contact Chalino Horton to schedule. Care Transition Nurse reviewed discharge instructions, medical action plan, and red flags with patient and family who verbalized understanding. The patient and family was given an opportunity to ask questions and does not have any further questions or concerns at this time. Were discharge instructions available to patient? Yes. Reviewed appropriate site of care based on symptoms and resources available to patient including: PCP. The patient and family agrees to contact the PCP office for questions related to their healthcare. Advance Care Planning:   Does patient have an Advance Directive: reviewed and current. Medication reconciliation was performed with family and pharmacy , who verbalizes understanding of administration of home medications.  Medications reviewed, 1111F entered: yes    Was patient discharged with a pulse oximeter? no    Non-face-to-face services provided:  Obtained and reviewed discharge summary and/or continuity of care documents  Communication with home health agencies or other

## 2023-10-24 ENCOUNTER — OFFICE VISIT (OUTPATIENT)
Dept: FAMILY MEDICINE CLINIC | Age: 88
End: 2023-10-24

## 2023-10-24 VITALS
DIASTOLIC BLOOD PRESSURE: 67 MMHG | OXYGEN SATURATION: 91 % | TEMPERATURE: 98.5 F | HEART RATE: 62 BPM | BODY MASS INDEX: 26.31 KG/M2 | SYSTOLIC BLOOD PRESSURE: 106 MMHG | WEIGHT: 139.38 LBS | HEIGHT: 61 IN

## 2023-10-24 DIAGNOSIS — Z09 HOSPITAL DISCHARGE FOLLOW-UP: Primary | ICD-10-CM

## 2023-10-24 DIAGNOSIS — I50.9 ACUTE CONGESTIVE HEART FAILURE, UNSPECIFIED HEART FAILURE TYPE (HCC): ICD-10-CM

## 2023-10-24 DIAGNOSIS — R00.1 AV JUNCTIONAL BRADYCARDIA: ICD-10-CM

## 2023-10-24 RX ORDER — DONEPEZIL HYDROCHLORIDE 5 MG/1
TABLET, FILM COATED ORAL
Qty: 90 TABLET | Refills: 1 | Status: SHIPPED | OUTPATIENT
Start: 2023-10-24

## 2023-10-24 RX ORDER — OMEPRAZOLE 20 MG/1
CAPSULE, DELAYED RELEASE ORAL
Qty: 90 CAPSULE | Refills: 1 | Status: SHIPPED | OUTPATIENT
Start: 2023-10-24

## 2023-10-24 NOTE — PROGRESS NOTES
vitals taken for this visit. General Appearance: alert and oriented to person, place and time  Skin: warm and dry  Head: normocephalic and atraumatic  Eyes: pupils equal, round, and reactive to light  ENT: tympanic membrane, external ear and ear canal normal bilaterally, oropharynx clear and moist with normal mucous membranes  Neck: neck supple and non tender without mass   Pulmonary/Chest: clear to auscultation bilaterally- no wheezes, rales or rhonchi, normal air movement, no respiratory distress  Cardiovascular: normal rate  Abdomen: soft, non-tender  Extremities: no cyanosis, no clubbing, and no edema  Musculoskeletal: normal range of motion, no joint swelling, deformity or tenderness  Neurologic: gait and coordination normal and speech normal      An electronic signature was used to authenticate this note.   --TYLER Plaza - CNP

## 2023-10-31 ENCOUNTER — CARE COORDINATION (OUTPATIENT)
Dept: CASE MANAGEMENT | Age: 88
End: 2023-10-31

## 2023-11-03 ENCOUNTER — CARE COORDINATION (OUTPATIENT)
Dept: CASE MANAGEMENT | Age: 88
End: 2023-11-03

## 2023-11-03 NOTE — CARE COORDINATION
Care Transitions Follow Up Call    Patient Current Location:  Home: [address] [place] [address]  [place] [id number]    Care Transition Nurse contacted the family by telephone to follow up after admission on 10/16. Verified name and  with family as identifiers. Patient: Martha Glass  Patient : 1926   MRN: [id number]  Reason for Admission: CHF  Discharge Date: 10/22/23 RARS: Readmission Risk Score: 10.5      Needs to be reviewed by the provider   Additional needs identified to be addressed with provider: No  none             Method of communication with provider: none. Conversation:  Spoke to [employer] after 2 IDs. She is currently in [place] having lunch so limited availability to provide information. States her aunt is getting along. She is waker than in this summer. Currently she is not answering the phone as she has lost her hearing aids. She has appt next week to obtain new aids. Pt is weighing herself and family is encouraging her to elevate her legs. Stressed importance of watching edema, fluids and salt intake. She is sleeping and eating well. [facility] nurse will be out today and she saw a therapist yesterday. Left my contact information. Addressed changes since last contact:   reviewed CHF quickly as niece in restaurant  Discussed follow-up appointments. If no appointment was previously scheduled, appointment scheduling offered: Yes. Is follow up appointment scheduled within 7 days of discharge? Yes. Follow Up  Future Appointments   Date Time Provider [address] [place] [address]   2023 11:00 AM TYLER Pop CNP ANGIE [facility]   2024 12:00 PM [facility] [place]     External follow up appointment(s):     Care Transition Nurse reviewed red flags with family and discussed any barriers to care and/or understanding of plan of care after discharge. Discussed appropriate site of care based on symptoms and resources available to patient including: PCP.  The family agrees to

## 2023-11-10 ENCOUNTER — TELEPHONE (OUTPATIENT)
Dept: FAMILY MEDICINE CLINIC | Age: 88
End: 2023-11-10

## 2023-11-10 ENCOUNTER — CARE COORDINATION (OUTPATIENT)
Dept: CASE MANAGEMENT | Age: 88
End: 2023-11-10

## 2023-11-10 NOTE — CARE COORDINATION
Care Transitions Follow Up Call    Patient Current Location: Home: [address] [place] [address] Gave [id number]    Care Transition Nurse contacted the patient by telephone to follow up after recent hospital admission. Patient: Hansel Wright  Patient : 1926   MRN: [id number]  Reason for Admission: 6 days (DA from [facility]) -> acute CHF, junctional bradycardia, chronic A Fib on Xarelto, elevated troponin, hx CAD, emphysema-no AE, urinary incontinence, GERD, Alzheimer's dementia -> home with [facility], AM-PAC 17, refused SNF, completed TCM 10/24  Discharge Date: 10/22/23 RARS: Readmission Risk Score: 10.5      Needs to be reviewed by the provider   Additional needs identified to be addressed with provider: No         Method of communication with provider: none. Patient reports she is doing very well. [facility] [employer] [facility]. nursing and therapy have been out and that PT did not even pick her up for additional visits after eval because she did so well during assessment. States he walked her around in the home and up/down stairs and she was able to without concerns. She spent the entire day out yesterday with her niece and tolerated well. Today's weight 139#. She has no concerns and denies needs at this time. States she feels more like herself than she has in a long time. Enjoying sitting on her enclosed porch watching the leaves fall. Addressed changes since last contact:  none  Discussed follow-up appointments. If no appointment was previously scheduled, appointment scheduling offered: done. Is follow up appointment scheduled within 7 days of discharge? done.     Follow Up  Future Appointments   Date Time Provider [address] [place] [address]   2023 11:00 AM Duane Cue, APRN - CNP P [facility]   2024 12:00 PM [facility] - [facility]     External follow up appointment(s): NIMO    Care Transition Nurse reviewed medical action plan and red flags with patient and discussed any barriers

## 2023-11-16 ENCOUNTER — CARE COORDINATION (OUTPATIENT)
Dept: CASE MANAGEMENT | Age: 88
End: 2023-11-16

## 2023-11-16 NOTE — CARE COORDINATION
Care Transitions Follow Up Call      Patient: Franklin Rodriguez  Patient : 1926   MRN: [id number]  Reason for Admission: 6 days (DA from [facility]) -> acute CHF, junctional bradycardia, chronic A Fib on Xarelto, elevated troponin, hx CAD, emphysema-no AE, urinary incontinence, GERD, Alzheimer's dementia -> home with [facility], AM-PAC 17, refused SNF, completed TCM 10/24  Discharge Date: 10/22/23 RARS: Readmission Risk Score: 10.5    CTN attempted follow up outreach without success - \"User Busy\" error twice. Will follow up at another time.      Follow Up  Future Appointments   Date Time Provider [address] [place] [address]   2023 11:00 AM TYLER George - CNP P [facility]   2024 12:00 PM TYLER Baker - [address]     Huseyin Hope RN  Care Transition Nurse  [phone] mobile

## 2023-11-21 ENCOUNTER — CARE COORDINATION (OUTPATIENT)
Dept: CASE MANAGEMENT | Age: 88
End: 2023-11-21

## 2023-11-21 RX ORDER — OXYBUTYNIN CHLORIDE 5 MG/1
TABLET ORAL
Qty: 90 TABLET | Refills: 5 | Status: SHIPPED | OUTPATIENT
Start: 2023-11-21

## 2023-11-21 NOTE — CARE COORDINATION
Care Transitions Follow Up Call      Patient: Shaylee Sibley  Patient : 1926   MRN: [id number]  Reason for Admission: 6 days (DA from [facility]) -> acute CHF, junctional bradycardia, chronic A Fib on Xarelto, elevated troponin, hx CAD, emphysema-no AE, urinary incontinence, GERD, Alzheimer's dementia -> home with [facility], AM-PAC 17, refused SNF, completed TCM 10/24  Discharge Date: 10/22/23 RARS: Readmission Risk Score: 10.5    Unable to reach - 2nd attempt. Handoff to AC at this time. Care transition program closed.     Follow Up  Future Appointments   Date Time Provider [address] [place] [address]   2023 11:00 AM TYLER Lakhani CNP P CLER CAR [facility]   2024 12:00 PM Monika Jenny, APRN - CNP [facility] [address] Ni, RN  Care Transition Nurse  [phone] mobile

## 2023-11-27 ENCOUNTER — CARE COORDINATION (OUTPATIENT)
Dept: CARE COORDINATION | Age: 88
End: 2023-11-27

## 2023-11-27 NOTE — CARE COORDINATION
Ambulatory Care Coordination Note  2023    Patient Current Location:  Home: [address] [place] [address]  [place] Love [address]     ACM contacted the patient by telephone. Verified name and  with patient as identifiers. Provided introduction to self, and explanation of the ACM role. Challenges to be reviewed by the provider   Additional needs identified to be addressed with provider: No  none    PLAN:  Will send the following for review:  CHF zones updated HD  Learning About Heart Failure  Low Sodium Seasoning Tips  Heart Happy Holidays             Method of communication with provider: none. ACM: Earl Lombard, RN    Patient answered phone  Re-introduced role of ACM/CC; patient agrees to f/u calls  Patient reports she is very [address] and having difficulty hearing this ACM  Patient requested this ACM speak louder   This ACM spoke louder and slower and patient reports this is much better  Patient informs this ACM she has an appointment for new hearing aides 23    Patient reports she has been doing well since she has been home from the hospital  She further reports she thinks she is doing much better than she would have in a NH  She informs this ACM her niece visits at least once a day and she has additional family/friends that check in on her  Her medications are prepackaged from [facility]  She reports she occasionally forgets to take her evening medications but not often    Patient reports she is weighing/recording her daily weights  Began review of CHF zones; patient able to verbalize most symptoms            Offered patient enrollment in the Remote Patient Monitoring (RPM) program for in-home monitoring:  Patient self monitors .         Care Coordination Interventions    Referral from Primary Care Provider: No  Suggested Interventions and Community Resources          Goals Addressed    None         Future Appointments   Date Time Provider [address] [place] [address]   2024 12:00 PM Shi Cardona, APRN - CNP [facility]

## 2023-12-05 ENCOUNTER — CARE COORDINATION (OUTPATIENT)
Dept: CARE COORDINATION | Age: 88
End: 2023-12-05

## 2023-12-12 ENCOUNTER — CARE COORDINATION (OUTPATIENT)
Dept: CARE COORDINATION | Age: 88
End: 2023-12-12

## 2023-12-12 NOTE — CARE COORDINATION
Ambulatory Care Coordination Note  2023    Patient Current Location:  Home: [address] [place] [address]  [place] [address]     ACM contacted the patient by telephone. Verified name and  with patient as identifiers. Provided introduction to self, and explanation of the ACM role. Challenges to be reviewed by the provider   Additional needs identified to be addressed with provider: No  none    PLAN:  No change in plan of care  Patient will continue to monitor daily weights  Notify PCP/Cardiology if in yellow zone           Method of communication with provider: none.     ACM: Roger Kendrick RN      Reviewed CHF Zones    General Assessment    Do you have any symptoms that are causing concern?: No          Congestive Heart Failure Assessment           Symptoms:     Symptom course: stable  Weight trend: stable (Comment: 139 lb)  Salt intake watch compared to last visit: improved          Care Coordination Interventions    Referral from Primary Care Provider: No  Suggested Interventions and Community Resources          Goals Addressed                   This Visit's Progress       Care Coordination     Self Monitoring        Daily Weights - I will weight myself as directed - Daily and write down weights    Barriers: none  Plan for overcoming my barriers: N/A  Confidence: 10/10  Anticipated Goal Completion Date: 23                Future Appointments   Date Time Provider [address]   2024 12:00 PM Rosalea Feathers, APRN - CNP [facility] [place] - [place]

## 2023-12-26 ENCOUNTER — CARE COORDINATION (OUTPATIENT)
Dept: CARE COORDINATION | Age: 88
End: 2023-12-26

## 2023-12-26 DIAGNOSIS — E78.00 PURE HYPERCHOLESTEROLEMIA: ICD-10-CM

## 2023-12-26 RX ORDER — PRAVASTATIN SODIUM 20 MG
TABLET ORAL
Qty: 90 TABLET | Refills: 3 | Status: SHIPPED | OUTPATIENT
Start: 2023-12-26

## 2023-12-26 NOTE — CARE COORDINATION
Ambulatory Care Coordination Note  2023    Patient Current Location:  Home: [address] [place] [address]  Sara Sparks [id number]     ACM contacted the patient by telephone. Verified name and  with patient as identifiers. Provided introduction to self, and explanation of the ACM role. Challenges to be reviewed by the provider   Additional needs identified to be addressed with provider: Yes    Patient requesting advise or medication for bilateral hand pain  Patient also reports she has difficulty opening doors/jars d/t chronic pain    General Assessment    Do you have any symptoms that are causing concern?: Yes  Reported Symptoms: Pain (Comment: Patient c/o chronic pain which she describes as \"arthritic pain\" in bilateral hands; she is requesting advise from PCP for help with the pain)                 Method of communication with provider: chart routing. ACM: Stevan Richardson RN  Patient able to verbalize CHF s/s to notify PCP/Cardiology without prompting  Patient also verbalizes importance of low sodium diet and monitoring daily weights  Noted patient did not schedule f/u appointment with cardiology  Patient reports she forgot to schedule appointment and she accepted this ACM's assistance  This ACM contacted Dr. Baylee Sweeney office and provided three way call  Patient is [facility] and she had difficulty hearing both of us   The cardiologist offered to call patient back to schedule appointment; patient verbalized understanding and appreciates the follow up    Patient called this ACM after she scheduled appointment with Dr. Charlotte Ellis. Informed patient this ACM sent message to her PCP regarding her hand pain and to let her know someone will f/u with her.   Reminded patient of upcoming appointment with PCP in April    Congestive Heart Failure Assessment       No patient-reported symptoms      Symptoms:     Symptom course: stable  Patient-reported weight (lb):  (Comment: 137 lb)  Weight trend: fluctuating minimally (Comment: 1-2 lbs)

## 2023-12-27 NOTE — CARE COORDINATION
Noted PCP's response to patient's request for advise regarding arthritis pain: \"Tylenol arthritis as needed for arthalgias\"  Attempted to notify patient; phone rings and rings with no answer.

## 2023-12-28 NOTE — CARE COORDINATION
Patient returned call  Informed patient her PCP recommended Tylenol Arthritis as needed for her bilateral hand pain; patient verbalized understanding with teach back  Further informed patient she can get this medication OTC

## 2024-01-12 ENCOUNTER — APPOINTMENT (OUTPATIENT)
Dept: GENERAL RADIOLOGY | Age: 89
DRG: 871 | End: 2024-01-12
Payer: MEDICARE

## 2024-01-12 ENCOUNTER — HOSPITAL ENCOUNTER (INPATIENT)
Age: 89
LOS: 3 days | Discharge: NURSING, REHAB OR LONG-TERM CARE | DRG: 871 | End: 2024-01-16
Attending: EMERGENCY MEDICINE | Admitting: INTERNAL MEDICINE
Payer: MEDICARE

## 2024-01-12 DIAGNOSIS — J18.9 PNEUMONIA OF BOTH LUNGS DUE TO INFECTIOUS ORGANISM, UNSPECIFIED PART OF LUNG: Primary | ICD-10-CM

## 2024-01-12 DIAGNOSIS — D72.829 LEUKOCYTOSIS, UNSPECIFIED TYPE: ICD-10-CM

## 2024-01-12 DIAGNOSIS — R79.89 ELEVATED TROPONIN: ICD-10-CM

## 2024-01-12 DIAGNOSIS — A41.9 SEPTICEMIA (HCC): ICD-10-CM

## 2024-01-12 LAB
ALBUMIN SERPL-MCNC: 3.8 G/DL (ref 3.4–5)
ALBUMIN/GLOB SERPL: 1.4 {RATIO} (ref 1.1–2.2)
ALP SERPL-CCNC: 83 U/L (ref 40–129)
ALT SERPL-CCNC: 8 U/L (ref 10–40)
ANION GAP SERPL CALCULATED.3IONS-SCNC: 12 MMOL/L (ref 3–16)
AST SERPL-CCNC: 15 U/L (ref 15–37)
BASE EXCESS BLDV CALC-SCNC: 0.5 MMOL/L (ref -3–3)
BASOPHILS # BLD: 0 K/UL (ref 0–0.2)
BASOPHILS NFR BLD: 0.1 %
BILIRUB SERPL-MCNC: 0.7 MG/DL (ref 0–1)
BUN SERPL-MCNC: 18 MG/DL (ref 7–20)
CALCIUM SERPL-MCNC: 8.5 MG/DL (ref 8.3–10.6)
CHLORIDE SERPL-SCNC: 99 MMOL/L (ref 99–110)
CO2 BLDV-SCNC: 26 MMOL/L
CO2 SERPL-SCNC: 25 MMOL/L (ref 21–32)
COHGB MFR BLDV: 0.9 % (ref 0–1.5)
CREAT SERPL-MCNC: 0.8 MG/DL (ref 0.6–1.2)
DEPRECATED RDW RBC AUTO: 15.3 % (ref 12.4–15.4)
EOSINOPHIL # BLD: 0 K/UL (ref 0–0.6)
EOSINOPHIL NFR BLD: 0.1 %
FLUAV RNA RESP QL NAA+PROBE: NOT DETECTED
FLUBV RNA RESP QL NAA+PROBE: NOT DETECTED
GFR SERPLBLD CREATININE-BSD FMLA CKD-EPI: >60 ML/MIN/{1.73_M2}
GLUCOSE SERPL-MCNC: 155 MG/DL (ref 70–99)
HCO3 BLDV-SCNC: 24.9 MMOL/L (ref 23–29)
HCT VFR BLD AUTO: 33 % (ref 36–48)
HGB BLD-MCNC: 11.1 G/DL (ref 12–16)
LACTATE BLDV-SCNC: 1.7 MMOL/L (ref 0.4–1.9)
LYMPHOCYTES # BLD: 0.7 K/UL (ref 1–5.1)
LYMPHOCYTES NFR BLD: 4.4 %
MCH RBC QN AUTO: 29 PG (ref 26–34)
MCHC RBC AUTO-ENTMCNC: 33.6 G/DL (ref 31–36)
MCV RBC AUTO: 86.4 FL (ref 80–100)
METHGB MFR BLDV: 0.3 %
MONOCYTES # BLD: 1.4 K/UL (ref 0–1.3)
MONOCYTES NFR BLD: 8.8 %
NEUTROPHILS # BLD: 13.5 K/UL (ref 1.7–7.7)
NEUTROPHILS NFR BLD: 86.6 %
O2 CT VFR BLDV CALC: 12 VOL %
O2 THERAPY: ABNORMAL
PCO2 BLDV: 39.5 MMHG (ref 40–50)
PH BLDV: 7.42 [PH] (ref 7.35–7.45)
PLATELET # BLD AUTO: 189 K/UL (ref 135–450)
PMV BLD AUTO: 7.4 FL (ref 5–10.5)
PO2 BLDV: 39.2 MMHG (ref 25–40)
POTASSIUM SERPL-SCNC: 3.9 MMOL/L (ref 3.5–5.1)
PROT SERPL-MCNC: 6.5 G/DL (ref 6.4–8.2)
RBC # BLD AUTO: 3.82 M/UL (ref 4–5.2)
SAO2 % BLDV: 75 %
SARS-COV-2 RNA RESP QL NAA+PROBE: NOT DETECTED
SODIUM SERPL-SCNC: 136 MMOL/L (ref 136–145)
TROPONIN, HIGH SENSITIVITY: 20 NG/L (ref 0–14)
TROPONIN, HIGH SENSITIVITY: 22 NG/L (ref 0–14)
WBC # BLD AUTO: 15.6 K/UL (ref 4–11)

## 2024-01-12 PROCEDURE — 71045 X-RAY EXAM CHEST 1 VIEW: CPT

## 2024-01-12 PROCEDURE — 84484 ASSAY OF TROPONIN QUANT: CPT

## 2024-01-12 PROCEDURE — 96365 THER/PROPH/DIAG IV INF INIT: CPT

## 2024-01-12 PROCEDURE — 36415 COLL VENOUS BLD VENIPUNCTURE: CPT

## 2024-01-12 PROCEDURE — 85025 COMPLETE CBC W/AUTO DIFF WBC: CPT

## 2024-01-12 PROCEDURE — 93005 ELECTROCARDIOGRAM TRACING: CPT | Performed by: EMERGENCY MEDICINE

## 2024-01-12 PROCEDURE — 84145 PROCALCITONIN (PCT): CPT

## 2024-01-12 PROCEDURE — 96367 TX/PROPH/DG ADDL SEQ IV INF: CPT

## 2024-01-12 PROCEDURE — 99285 EMERGENCY DEPT VISIT HI MDM: CPT

## 2024-01-12 PROCEDURE — 2580000003 HC RX 258: Performed by: EMERGENCY MEDICINE

## 2024-01-12 PROCEDURE — 87636 SARSCOV2 & INF A&B AMP PRB: CPT

## 2024-01-12 PROCEDURE — 80053 COMPREHEN METABOLIC PANEL: CPT

## 2024-01-12 PROCEDURE — 82803 BLOOD GASES ANY COMBINATION: CPT

## 2024-01-12 PROCEDURE — 6360000002 HC RX W HCPCS: Performed by: EMERGENCY MEDICINE

## 2024-01-12 PROCEDURE — 87040 BLOOD CULTURE FOR BACTERIA: CPT

## 2024-01-12 PROCEDURE — 83605 ASSAY OF LACTIC ACID: CPT

## 2024-01-12 RX ORDER — 0.9 % SODIUM CHLORIDE 0.9 %
500 INTRAVENOUS SOLUTION INTRAVENOUS ONCE
Status: COMPLETED | OUTPATIENT
Start: 2024-01-12 | End: 2024-01-12

## 2024-01-12 RX ADMIN — SODIUM CHLORIDE 500 ML: 9 INJECTION, SOLUTION INTRAVENOUS at 20:56

## 2024-01-12 RX ADMIN — CEFEPIME 2000 MG: 2 INJECTION, POWDER, FOR SOLUTION INTRAVENOUS at 20:58

## 2024-01-12 RX ADMIN — AZITHROMYCIN MONOHYDRATE 500 MG: 500 INJECTION, POWDER, LYOPHILIZED, FOR SOLUTION INTRAVENOUS at 21:41

## 2024-01-12 ASSESSMENT — PAIN - FUNCTIONAL ASSESSMENT: PAIN_FUNCTIONAL_ASSESSMENT: NONE - DENIES PAIN

## 2024-01-13 PROBLEM — J18.9 COMMUNITY ACQUIRED PNEUMONIA, UNSPECIFIED LATERALITY: Status: ACTIVE | Noted: 2024-01-13

## 2024-01-13 PROBLEM — R79.89 ELEVATED TROPONIN: Status: ACTIVE | Noted: 2024-01-13

## 2024-01-13 PROBLEM — D72.829 LEUKOCYTOSIS: Status: ACTIVE | Noted: 2024-01-13

## 2024-01-13 PROBLEM — A41.9 SEPTICEMIA (HCC): Status: ACTIVE | Noted: 2024-01-13

## 2024-01-13 LAB
ALBUMIN SERPL-MCNC: 3.3 G/DL (ref 3.4–5)
ALBUMIN/GLOB SERPL: 1.2 {RATIO} (ref 1.1–2.2)
ALP SERPL-CCNC: 71 U/L (ref 40–129)
ALT SERPL-CCNC: 8 U/L (ref 10–40)
ANION GAP SERPL CALCULATED.3IONS-SCNC: 10 MMOL/L (ref 3–16)
AST SERPL-CCNC: 17 U/L (ref 15–37)
BASOPHILS # BLD: 0 K/UL (ref 0–0.2)
BASOPHILS NFR BLD: 0.1 %
BILIRUB SERPL-MCNC: 0.4 MG/DL (ref 0–1)
BUN SERPL-MCNC: 19 MG/DL (ref 7–20)
CALCIUM SERPL-MCNC: 8.7 MG/DL (ref 8.3–10.6)
CHLORIDE SERPL-SCNC: 108 MMOL/L (ref 99–110)
CO2 SERPL-SCNC: 26 MMOL/L (ref 21–32)
CREAT SERPL-MCNC: 0.6 MG/DL (ref 0.6–1.2)
DEPRECATED RDW RBC AUTO: 15.1 % (ref 12.4–15.4)
EKG ATRIAL RATE: 98 BPM
EKG DIAGNOSIS: NORMAL
EKG Q-T INTERVAL: 208 MS
EKG QRS DURATION: 84 MS
EKG QTC CALCULATION (BAZETT): 254 MS
EKG R AXIS: 2 DEGREES
EKG T AXIS: 163 DEGREES
EKG VENTRICULAR RATE: 90 BPM
EOSINOPHIL # BLD: 0 K/UL (ref 0–0.6)
EOSINOPHIL NFR BLD: 0 %
GFR SERPLBLD CREATININE-BSD FMLA CKD-EPI: >60 ML/MIN/{1.73_M2}
GLUCOSE SERPL-MCNC: 163 MG/DL (ref 70–99)
HCT VFR BLD AUTO: 31.4 % (ref 36–48)
HGB BLD-MCNC: 10.5 G/DL (ref 12–16)
LYMPHOCYTES # BLD: 0.4 K/UL (ref 1–5.1)
LYMPHOCYTES NFR BLD: 3.2 %
MCH RBC QN AUTO: 29 PG (ref 26–34)
MCHC RBC AUTO-ENTMCNC: 33.6 G/DL (ref 31–36)
MCV RBC AUTO: 86.2 FL (ref 80–100)
MONOCYTES # BLD: 0.3 K/UL (ref 0–1.3)
MONOCYTES NFR BLD: 2.2 %
NEUTROPHILS # BLD: 12 K/UL (ref 1.7–7.7)
NEUTROPHILS NFR BLD: 94.5 %
NT-PROBNP SERPL-MCNC: 1381 PG/ML (ref 0–449)
PLATELET # BLD AUTO: 166 K/UL (ref 135–450)
PMV BLD AUTO: 7.5 FL (ref 5–10.5)
POTASSIUM SERPL-SCNC: 4.2 MMOL/L (ref 3.5–5.1)
PROCALCITONIN SERPL IA-MCNC: 0.16 NG/ML (ref 0–0.15)
PROT SERPL-MCNC: 6 G/DL (ref 6.4–8.2)
RBC # BLD AUTO: 3.64 M/UL (ref 4–5.2)
SODIUM SERPL-SCNC: 144 MMOL/L (ref 136–145)
TROPONIN, HIGH SENSITIVITY: 16 NG/L (ref 0–14)
WBC # BLD AUTO: 12.7 K/UL (ref 4–11)

## 2024-01-13 PROCEDURE — 2700000000 HC OXYGEN THERAPY PER DAY

## 2024-01-13 PROCEDURE — 87205 SMEAR GRAM STAIN: CPT

## 2024-01-13 PROCEDURE — 2580000003 HC RX 258: Performed by: INTERNAL MEDICINE

## 2024-01-13 PROCEDURE — 1200000000 HC SEMI PRIVATE

## 2024-01-13 PROCEDURE — 6370000000 HC RX 637 (ALT 250 FOR IP)

## 2024-01-13 PROCEDURE — 94761 N-INVAS EAR/PLS OXIMETRY MLT: CPT

## 2024-01-13 PROCEDURE — 99223 1ST HOSP IP/OBS HIGH 75: CPT

## 2024-01-13 PROCEDURE — 6360000002 HC RX W HCPCS

## 2024-01-13 PROCEDURE — 87641 MR-STAPH DNA AMP PROBE: CPT

## 2024-01-13 PROCEDURE — 87633 RESP VIRUS 12-25 TARGETS: CPT

## 2024-01-13 PROCEDURE — 87070 CULTURE OTHR SPECIMN AEROBIC: CPT

## 2024-01-13 PROCEDURE — 6360000002 HC RX W HCPCS: Performed by: INTERNAL MEDICINE

## 2024-01-13 PROCEDURE — 85025 COMPLETE CBC W/AUTO DIFF WBC: CPT

## 2024-01-13 PROCEDURE — 84484 ASSAY OF TROPONIN QUANT: CPT

## 2024-01-13 PROCEDURE — 2580000003 HC RX 258

## 2024-01-13 PROCEDURE — 80053 COMPREHEN METABOLIC PANEL: CPT

## 2024-01-13 PROCEDURE — 36415 COLL VENOUS BLD VENIPUNCTURE: CPT

## 2024-01-13 PROCEDURE — 6370000000 HC RX 637 (ALT 250 FOR IP): Performed by: INTERNAL MEDICINE

## 2024-01-13 PROCEDURE — 94640 AIRWAY INHALATION TREATMENT: CPT

## 2024-01-13 PROCEDURE — 93010 ELECTROCARDIOGRAM REPORT: CPT | Performed by: INTERNAL MEDICINE

## 2024-01-13 PROCEDURE — 83880 ASSAY OF NATRIURETIC PEPTIDE: CPT

## 2024-01-13 RX ORDER — ACETAMINOPHEN 650 MG/1
650 SUPPOSITORY RECTAL EVERY 6 HOURS PRN
Status: DISCONTINUED | OUTPATIENT
Start: 2024-01-13 | End: 2024-01-16 | Stop reason: HOSPADM

## 2024-01-13 RX ORDER — IPRATROPIUM BROMIDE AND ALBUTEROL SULFATE 2.5; .5 MG/3ML; MG/3ML
1 SOLUTION RESPIRATORY (INHALATION) EVERY 4 HOURS PRN
Status: DISCONTINUED | OUTPATIENT
Start: 2024-01-13 | End: 2024-01-13

## 2024-01-13 RX ORDER — FUROSEMIDE 40 MG/1
40 TABLET ORAL DAILY
Status: DISCONTINUED | OUTPATIENT
Start: 2024-01-13 | End: 2024-01-16 | Stop reason: HOSPADM

## 2024-01-13 RX ORDER — ALBUTEROL SULFATE 90 UG/1
2 AEROSOL, METERED RESPIRATORY (INHALATION) EVERY 4 HOURS PRN
Status: DISCONTINUED | OUTPATIENT
Start: 2024-01-13 | End: 2024-01-16 | Stop reason: HOSPADM

## 2024-01-13 RX ORDER — LANOLIN ALCOHOL/MO/W.PET/CERES
3 CREAM (GRAM) TOPICAL NIGHTLY
Status: DISCONTINUED | OUTPATIENT
Start: 2024-01-13 | End: 2024-01-16 | Stop reason: HOSPADM

## 2024-01-13 RX ORDER — POTASSIUM CHLORIDE 7.45 MG/ML
10 INJECTION INTRAVENOUS PRN
Status: DISCONTINUED | OUTPATIENT
Start: 2024-01-13 | End: 2024-01-16 | Stop reason: HOSPADM

## 2024-01-13 RX ORDER — PRAVASTATIN SODIUM 10 MG
20 TABLET ORAL NIGHTLY
Status: DISCONTINUED | OUTPATIENT
Start: 2024-01-13 | End: 2024-01-16 | Stop reason: HOSPADM

## 2024-01-13 RX ORDER — SODIUM CHLORIDE 0.9 % (FLUSH) 0.9 %
10 SYRINGE (ML) INJECTION EVERY 12 HOURS SCHEDULED
Status: DISCONTINUED | OUTPATIENT
Start: 2024-01-13 | End: 2024-01-16 | Stop reason: HOSPADM

## 2024-01-13 RX ORDER — MAGNESIUM SULFATE IN WATER 40 MG/ML
2000 INJECTION, SOLUTION INTRAVENOUS PRN
Status: DISCONTINUED | OUTPATIENT
Start: 2024-01-13 | End: 2024-01-16 | Stop reason: HOSPADM

## 2024-01-13 RX ORDER — SODIUM CHLORIDE 9 MG/ML
INJECTION, SOLUTION INTRAVENOUS PRN
Status: DISCONTINUED | OUTPATIENT
Start: 2024-01-13 | End: 2024-01-16 | Stop reason: HOSPADM

## 2024-01-13 RX ORDER — SODIUM CHLORIDE 9 MG/ML
INJECTION, SOLUTION INTRAVENOUS CONTINUOUS
Status: DISCONTINUED | OUTPATIENT
Start: 2024-01-13 | End: 2024-01-13

## 2024-01-13 RX ORDER — ENOXAPARIN SODIUM 100 MG/ML
40 INJECTION SUBCUTANEOUS DAILY
Status: DISCONTINUED | OUTPATIENT
Start: 2024-01-13 | End: 2024-01-13

## 2024-01-13 RX ORDER — ALBUTEROL SULFATE 90 UG/1
2 AEROSOL, METERED RESPIRATORY (INHALATION) EVERY 6 HOURS PRN
Status: DISCONTINUED | OUTPATIENT
Start: 2024-01-13 | End: 2024-01-13

## 2024-01-13 RX ORDER — ONDANSETRON 2 MG/ML
4 INJECTION INTRAMUSCULAR; INTRAVENOUS EVERY 6 HOURS PRN
Status: DISCONTINUED | OUTPATIENT
Start: 2024-01-13 | End: 2024-01-16 | Stop reason: HOSPADM

## 2024-01-13 RX ORDER — DONEPEZIL HYDROCHLORIDE 5 MG/1
5 TABLET, FILM COATED ORAL NIGHTLY
Status: DISCONTINUED | OUTPATIENT
Start: 2024-01-13 | End: 2024-01-16 | Stop reason: HOSPADM

## 2024-01-13 RX ORDER — PROMETHAZINE HYDROCHLORIDE 25 MG/1
12.5 TABLET ORAL EVERY 6 HOURS PRN
Status: DISCONTINUED | OUTPATIENT
Start: 2024-01-13 | End: 2024-01-16 | Stop reason: HOSPADM

## 2024-01-13 RX ORDER — SODIUM CHLORIDE 0.9 % (FLUSH) 0.9 %
10 SYRINGE (ML) INJECTION PRN
Status: DISCONTINUED | OUTPATIENT
Start: 2024-01-13 | End: 2024-01-16 | Stop reason: HOSPADM

## 2024-01-13 RX ORDER — ACETAMINOPHEN 325 MG/1
650 TABLET ORAL EVERY 6 HOURS PRN
Status: DISCONTINUED | OUTPATIENT
Start: 2024-01-13 | End: 2024-01-16 | Stop reason: HOSPADM

## 2024-01-13 RX ORDER — POTASSIUM CHLORIDE 7.45 MG/ML
10 INJECTION INTRAVENOUS PRN
Status: DISCONTINUED | OUTPATIENT
Start: 2024-01-13 | End: 2024-01-13 | Stop reason: SDUPTHER

## 2024-01-13 RX ORDER — NICOTINE 21 MG/24HR
1 PATCH, TRANSDERMAL 24 HOURS TRANSDERMAL DAILY
Status: DISCONTINUED | OUTPATIENT
Start: 2024-01-13 | End: 2024-01-15

## 2024-01-13 RX ORDER — POTASSIUM CHLORIDE 20 MEQ/1
40 TABLET, EXTENDED RELEASE ORAL PRN
Status: DISCONTINUED | OUTPATIENT
Start: 2024-01-13 | End: 2024-01-16 | Stop reason: HOSPADM

## 2024-01-13 RX ORDER — IPRATROPIUM BROMIDE AND ALBUTEROL SULFATE 2.5; .5 MG/3ML; MG/3ML
1 SOLUTION RESPIRATORY (INHALATION)
Status: DISCONTINUED | OUTPATIENT
Start: 2024-01-13 | End: 2024-01-16 | Stop reason: HOSPADM

## 2024-01-13 RX ADMIN — SODIUM CHLORIDE: 9 INJECTION, SOLUTION INTRAVENOUS at 13:44

## 2024-01-13 RX ADMIN — PRAVASTATIN SODIUM 20 MG: 10 TABLET ORAL at 21:41

## 2024-01-13 RX ADMIN — Medication 10 ML: at 08:35

## 2024-01-13 RX ADMIN — DONEPEZIL HYDROCHLORIDE 5 MG: 5 TABLET, FILM COATED ORAL at 21:41

## 2024-01-13 RX ADMIN — CEFEPIME 1000 MG: 1 INJECTION, POWDER, FOR SOLUTION INTRAMUSCULAR; INTRAVENOUS at 12:21

## 2024-01-13 RX ADMIN — ENOXAPARIN SODIUM 40 MG: 100 INJECTION SUBCUTANEOUS at 08:35

## 2024-01-13 RX ADMIN — IPRATROPIUM BROMIDE AND ALBUTEROL SULFATE 1 DOSE: 2.5; .5 SOLUTION RESPIRATORY (INHALATION) at 21:25

## 2024-01-13 RX ADMIN — VANCOMYCIN HYDROCHLORIDE 1000 MG: 1 INJECTION, POWDER, LYOPHILIZED, FOR SOLUTION INTRAVENOUS at 12:20

## 2024-01-13 RX ADMIN — RIVAROXABAN 15 MG: 15 TABLET, FILM COATED ORAL at 17:56

## 2024-01-13 NOTE — CONSULTS
Vanco per Pharmacy - HAP x7 days  Please give 1g IV Vanco x1 now to provide Gram+ organism coverage to include MRSA.  Start 500mg IV Q12hrs at 0100 1/14/24 and check a Trough 1/15 at Noon.  Pred AUC 4453, Tr 14.5, Tox 10%  Yury Simmons [facility] PharmD 1/13/2024 11:54 AM

## 2024-01-13 NOTE — H&P
Combo [[id number]] Collected: 01/12/24 1846    Order Status: Completed Specimen: Nasopharyngeal Swab Updated: 01/12/24 2005     SARS-CoV-2 RNA, RT PCR NOT DETECTED     Comment: Not Detected results do not preclude SARS-CoV-2 infection and  should not be used as the sole basis for patient management  decisions.  Results must be combined with clinical observations,  patient history, and epidemiological information.  Testing was performed using RELL CHRISTA SARS-CoV-2 and Influenza A/B  nucleic acid assay. This test is a multiplex Real-Time Reverse  Transcriptase Polymerase Chain Reaction (RT-PCR)-based in vitro  diagnostic test intended for the qualitative detection of nucleic  acids from SARS-CoV-2, influenza A, and influenza B in nasopharyngeal  and nasal swab specimens for use under the FDA’s Emergency Use  Authorization (EUA) only.    Patient Fact Sheet:  https://www.fda.gov/media/461274/download  Provider Fact Sheet: https://www.fda.gov/media/583902/download  EUA: https://www.fda.gov/media/800935/download  IFU: https://www.fda.gov/media/621395/download    Methodology:  RT-PCR          INFLUENZA A NOT DETECTED     INFLUENZA B NOT DETECTED              EKG:   Encounter Date: 01/12/24   EKG 12 Lead   Result Value    Ventricular Rate 90    Atrial Rate 98    QRS Duration 84    Q-T Interval 208    QTc Calculation (Bazett) 254    R Axis 2    T Axis 163    Diagnosis      Atrial fibrillationCannot rule out Anterior infarct (cited on or before 07-JUL-2023)Abnormal ECGWhen compared with ECG of 21-OCT-2023 03:31,Nonspecific T wave abnormality now evident in Lateral leadsQT has shortened         RADIOLOGY  XR CHEST PORTABLE   Final Result   Bibasilar airspace disease which could represent atelectasis and/or   developing pneumonia               Pertinent previous results reviewed   Echocardiogram from 10/16/23   Summary   Left ventricular systolic function is normal with ejection fraction   estimated at 55-65 %.   No regional

## 2024-01-13 NOTE — ED PROVIDER NOTES
[facility] ED     EMERGENCY DEPARTMENT ENCOUNTER            Pt Name: Marya Louis   MRN: [id number]   Birthdate 12/21/1926   Date of evaluation: 1/12/2024   Provider: Oneil Santillan MD   PCP: Renetta Irby APRN - CNP   Note Started: 7:06 PM EST 1/12/24          CHIEF COMPLAINT     Chief Complaint   Patient presents with    Cough     Arrives via EMS for fatigue and cough xdays. Wet cough in triage. Hx CHF. Pt received duoneb and 125 solumedrol en route.              HISTORY OF PRESENT ILLNESS:   History from : Patient   Limitations to history : None     Marya Louis is a 89 y.o. female who presents with cough.  The patient has a past medical history of heart failure, chronic A-fib, COPD, other.  The patient presents emergency department complaint of a 1 week history of cough productive of yellowish-green sputum.  No hemoptysis.  No fever.  No chills.  Mild associated shortness of breath.  She denies any current nausea vomiting or diarrhea.  She denies any chest pain.  She states she has been eating and drinking normally.  She states she lives alone despite just celebrating her 89th birthday in December.    Nursing Notes were all reviewed and agreed with, or any disagreements were addressed in the HPI.     REVIEW OF SYSTEMS :    Review of Systems   Constitutional:  Negative for fever.   HENT:  Negative for rhinorrhea and sore throat.    Eyes:  Negative for redness.   Respiratory:  Positive for cough and shortness of breath.    Cardiovascular:  Negative for chest pain.   Gastrointestinal:  Negative for abdominal pain.   Genitourinary:  Negative for flank pain.   Neurological:  Negative for headaches.   Hematological:  Negative for adenopathy.   Psychiatric/Behavioral:  Negative for confusion.         MEDICAL HISTORY   has a past medical history of Atherosclerosis of native coronary artery of native heart without angina pectoris, CHF (congestive heart failure) (HCC), Chronic atrial

## 2024-01-14 LAB
ALBUMIN SERPL-MCNC: 3.1 G/DL (ref 3.4–5)
ALBUMIN/GLOB SERPL: 1.2 {RATIO} (ref 1.1–2.2)
ALP SERPL-CCNC: 67 U/L (ref 40–129)
ALT SERPL-CCNC: 10 U/L (ref 10–40)
ANION GAP SERPL CALCULATED.3IONS-SCNC: 10 MMOL/L (ref 3–16)
AST SERPL-CCNC: 21 U/L (ref 15–37)
BASOPHILS # BLD: 0 K/UL (ref 0–0.2)
BASOPHILS NFR BLD: 0 %
BILIRUB SERPL-MCNC: 0.3 MG/DL (ref 0–1)
BUN SERPL-MCNC: 29 MG/DL (ref 7–20)
CALCIUM SERPL-MCNC: 8.8 MG/DL (ref 8.3–10.6)
CHLORIDE SERPL-SCNC: 104 MMOL/L (ref 99–110)
CO2 SERPL-SCNC: 24 MMOL/L (ref 21–32)
CREAT SERPL-MCNC: 0.7 MG/DL (ref 0.6–1.2)
DEPRECATED RDW RBC AUTO: 14.9 % (ref 12.4–15.4)
EOSINOPHIL # BLD: 0 K/UL (ref 0–0.6)
EOSINOPHIL NFR BLD: 0.2 %
GFR SERPLBLD CREATININE-BSD FMLA CKD-EPI: >60 ML/MIN/{1.73_M2}
GLUCOSE SERPL-MCNC: 100 MG/DL (ref 70–99)
HCT VFR BLD AUTO: 30.4 % (ref 36–48)
HGB BLD-MCNC: 10 G/DL (ref 12–16)
LYMPHOCYTES # BLD: 0.7 K/UL (ref 1–5.1)
LYMPHOCYTES NFR BLD: 5 %
MCH RBC QN AUTO: 28.6 PG (ref 26–34)
MCHC RBC AUTO-ENTMCNC: 32.9 G/DL (ref 31–36)
MCV RBC AUTO: 86.8 FL (ref 80–100)
MONOCYTES # BLD: 0.8 K/UL (ref 0–1.3)
MONOCYTES NFR BLD: 5.5 %
NEUTROPHILS # BLD: 12.9 K/UL (ref 1.7–7.7)
NEUTROPHILS NFR BLD: 89.3 %
ORGANISM: ABNORMAL
PLATELET # BLD AUTO: 196 K/UL (ref 135–450)
PMV BLD AUTO: 7.7 FL (ref 5–10.5)
POTASSIUM SERPL-SCNC: 3.6 MMOL/L (ref 3.5–5.1)
PROT SERPL-MCNC: 5.6 G/DL (ref 6.4–8.2)
RBC # BLD AUTO: 3.5 M/UL (ref 4–5.2)
REPORT: NORMAL
RESP PATH DNA+RNA PNL L RESP NAA+NON-PRB: ABNORMAL
SODIUM SERPL-SCNC: 138 MMOL/L (ref 136–145)
WBC # BLD AUTO: 14.4 K/UL (ref 4–11)

## 2024-01-14 PROCEDURE — 94640 AIRWAY INHALATION TREATMENT: CPT

## 2024-01-14 PROCEDURE — 85025 COMPLETE CBC W/AUTO DIFF WBC: CPT

## 2024-01-14 PROCEDURE — 6360000002 HC RX W HCPCS

## 2024-01-14 PROCEDURE — 97166 OT EVAL MOD COMPLEX 45 MIN: CPT

## 2024-01-14 PROCEDURE — 2580000003 HC RX 258: Performed by: INTERNAL MEDICINE

## 2024-01-14 PROCEDURE — 97530 THERAPEUTIC ACTIVITIES: CPT

## 2024-01-14 PROCEDURE — 80053 COMPREHEN METABOLIC PANEL: CPT

## 2024-01-14 PROCEDURE — 6370000000 HC RX 637 (ALT 250 FOR IP): Performed by: INTERNAL MEDICINE

## 2024-01-14 PROCEDURE — 97110 THERAPEUTIC EXERCISES: CPT

## 2024-01-14 PROCEDURE — 1200000000 HC SEMI PRIVATE

## 2024-01-14 PROCEDURE — 2700000000 HC OXYGEN THERAPY PER DAY

## 2024-01-14 PROCEDURE — 97162 PT EVAL MOD COMPLEX 30 MIN: CPT

## 2024-01-14 PROCEDURE — 2580000003 HC RX 258

## 2024-01-14 PROCEDURE — 97535 SELF CARE MNGMENT TRAINING: CPT

## 2024-01-14 PROCEDURE — 94761 N-INVAS EAR/PLS OXIMETRY MLT: CPT

## 2024-01-14 PROCEDURE — 6370000000 HC RX 637 (ALT 250 FOR IP)

## 2024-01-14 PROCEDURE — 6360000002 HC RX W HCPCS: Performed by: INTERNAL MEDICINE

## 2024-01-14 PROCEDURE — 36415 COLL VENOUS BLD VENIPUNCTURE: CPT

## 2024-01-14 PROCEDURE — 99233 SBSQ HOSP IP/OBS HIGH 50: CPT | Performed by: INTERNAL MEDICINE

## 2024-01-14 RX ADMIN — RIVAROXABAN 15 MG: 15 TABLET, FILM COATED ORAL at 17:09

## 2024-01-14 RX ADMIN — AZITHROMYCIN MONOHYDRATE 500 MG: 500 INJECTION, POWDER, LYOPHILIZED, FOR SOLUTION INTRAVENOUS at 13:13

## 2024-01-14 RX ADMIN — CEFEPIME 1000 MG: 1 INJECTION, POWDER, FOR SOLUTION INTRAMUSCULAR; INTRAVENOUS at 00:30

## 2024-01-14 RX ADMIN — IPRATROPIUM BROMIDE AND ALBUTEROL SULFATE 1 DOSE: 2.5; .5 SOLUTION RESPIRATORY (INHALATION) at 07:33

## 2024-01-14 RX ADMIN — Medication 10 ML: at 08:40

## 2024-01-14 RX ADMIN — Medication 3 MG: at 21:51

## 2024-01-14 RX ADMIN — IPRATROPIUM BROMIDE AND ALBUTEROL SULFATE 1 DOSE: 2.5; .5 SOLUTION RESPIRATORY (INHALATION) at 19:49

## 2024-01-14 RX ADMIN — DONEPEZIL HYDROCHLORIDE 5 MG: 5 TABLET, FILM COATED ORAL at 21:51

## 2024-01-14 RX ADMIN — CEFEPIME 1000 MG: 1 INJECTION, POWDER, FOR SOLUTION INTRAMUSCULAR; INTRAVENOUS at 11:22

## 2024-01-14 RX ADMIN — CEFTRIAXONE SODIUM 1000 MG: 1 INJECTION, POWDER, FOR SOLUTION INTRAMUSCULAR; INTRAVENOUS at 15:55

## 2024-01-14 RX ADMIN — PRAVASTATIN SODIUM 20 MG: 10 TABLET ORAL at 21:50

## 2024-01-14 RX ADMIN — METOPROLOL TARTRATE 25 MG: 25 TABLET, FILM COATED ORAL at 21:51

## 2024-01-14 RX ADMIN — VANCOMYCIN HYDROCHLORIDE 500 MG: 500 INJECTION, POWDER, LYOPHILIZED, FOR SOLUTION INTRAVENOUS at 01:50

## 2024-01-14 NOTE — FLOWSHEET NOTE
01/13/24 2130   Vital Signs   Temp 97.2 °F (36.2 °C)   Temp Source Oral   Pulse 73   Heart Rate Source Monitor   Respirations 18   /60   MAP (Calculated) 76   BP Location Right upper arm   BP Method Automatic   Patient Position High fowlers   Pain Assessment   Pain Assessment None - Denies Pain   Oxygen Therapy   SpO2 97 %   O2 Device Nasal cannula   O2 Flow Rate (L/min) 4 L/min

## 2024-01-15 PROBLEM — J96.01 ACUTE RESPIRATORY FAILURE WITH HYPOXIA (HCC): Status: ACTIVE | Noted: 2024-01-15

## 2024-01-15 LAB
ALBUMIN SERPL-MCNC: 3.1 G/DL (ref 3.4–5)
ALBUMIN/GLOB SERPL: 1.3 {RATIO} (ref 1.1–2.2)
ALP SERPL-CCNC: 64 U/L (ref 40–129)
ALT SERPL-CCNC: 18 U/L (ref 10–40)
ANION GAP SERPL CALCULATED.3IONS-SCNC: 9 MMOL/L (ref 3–16)
AST SERPL-CCNC: 26 U/L (ref 15–37)
BACTERIA SPEC RESP CULT: NORMAL
BASOPHILS # BLD: 0 K/UL (ref 0–0.2)
BASOPHILS NFR BLD: 0.1 %
BILIRUB SERPL-MCNC: <0.2 MG/DL (ref 0–1)
BUN SERPL-MCNC: 24 MG/DL (ref 7–20)
CALCIUM SERPL-MCNC: 8.3 MG/DL (ref 8.3–10.6)
CHLORIDE SERPL-SCNC: 105 MMOL/L (ref 99–110)
CO2 SERPL-SCNC: 25 MMOL/L (ref 21–32)
CREAT SERPL-MCNC: 0.6 MG/DL (ref 0.6–1.2)
DEPRECATED RDW RBC AUTO: 14.8 % (ref 12.4–15.4)
EOSINOPHIL # BLD: 0.3 K/UL (ref 0–0.6)
EOSINOPHIL NFR BLD: 4.2 %
GFR SERPLBLD CREATININE-BSD FMLA CKD-EPI: >60 ML/MIN/{1.73_M2}
GLUCOSE SERPL-MCNC: 92 MG/DL (ref 70–99)
GRAM STN SPEC: NORMAL
HCT VFR BLD AUTO: 30.5 % (ref 36–48)
HGB BLD-MCNC: 10.4 G/DL (ref 12–16)
LYMPHOCYTES # BLD: 0.7 K/UL (ref 1–5.1)
LYMPHOCYTES NFR BLD: 9.6 %
MAGNESIUM SERPL-MCNC: 2.1 MG/DL (ref 1.8–2.4)
MCH RBC QN AUTO: 29.4 PG (ref 26–34)
MCHC RBC AUTO-ENTMCNC: 34 G/DL (ref 31–36)
MCV RBC AUTO: 86.4 FL (ref 80–100)
MONOCYTES # BLD: 0.6 K/UL (ref 0–1.3)
MONOCYTES NFR BLD: 8.3 %
MRSA DNA SPEC QL NAA+PROBE: ABNORMAL
NEUTROPHILS # BLD: 6.1 K/UL (ref 1.7–7.7)
NEUTROPHILS NFR BLD: 77.8 %
ORGANISM: ABNORMAL
PLATELET # BLD AUTO: 210 K/UL (ref 135–450)
PMV BLD AUTO: 7.3 FL (ref 5–10.5)
POTASSIUM SERPL-SCNC: 3.3 MMOL/L (ref 3.5–5.1)
PROT SERPL-MCNC: 5.5 G/DL (ref 6.4–8.2)
RBC # BLD AUTO: 3.53 M/UL (ref 4–5.2)
SODIUM SERPL-SCNC: 139 MMOL/L (ref 136–145)
WBC # BLD AUTO: 7.8 K/UL (ref 4–11)

## 2024-01-15 PROCEDURE — 85025 COMPLETE CBC W/AUTO DIFF WBC: CPT

## 2024-01-15 PROCEDURE — 94640 AIRWAY INHALATION TREATMENT: CPT

## 2024-01-15 PROCEDURE — 6360000002 HC RX W HCPCS: Performed by: INTERNAL MEDICINE

## 2024-01-15 PROCEDURE — 6370000000 HC RX 637 (ALT 250 FOR IP): Performed by: INTERNAL MEDICINE

## 2024-01-15 PROCEDURE — 1200000000 HC SEMI PRIVATE

## 2024-01-15 PROCEDURE — 83735 ASSAY OF MAGNESIUM: CPT

## 2024-01-15 PROCEDURE — 36415 COLL VENOUS BLD VENIPUNCTURE: CPT

## 2024-01-15 PROCEDURE — 80053 COMPREHEN METABOLIC PANEL: CPT

## 2024-01-15 PROCEDURE — 2580000003 HC RX 258: Performed by: INTERNAL MEDICINE

## 2024-01-15 PROCEDURE — 99233 SBSQ HOSP IP/OBS HIGH 50: CPT | Performed by: INTERNAL MEDICINE

## 2024-01-15 PROCEDURE — 2700000000 HC OXYGEN THERAPY PER DAY

## 2024-01-15 PROCEDURE — 94761 N-INVAS EAR/PLS OXIMETRY MLT: CPT

## 2024-01-15 PROCEDURE — 6370000000 HC RX 637 (ALT 250 FOR IP)

## 2024-01-15 RX ADMIN — CEFTRIAXONE SODIUM 1000 MG: 1 INJECTION, POWDER, FOR SOLUTION INTRAMUSCULAR; INTRAVENOUS at 12:45

## 2024-01-15 RX ADMIN — METOPROLOL TARTRATE 25 MG: 25 TABLET, FILM COATED ORAL at 09:19

## 2024-01-15 RX ADMIN — DONEPEZIL HYDROCHLORIDE 5 MG: 5 TABLET, FILM COATED ORAL at 21:50

## 2024-01-15 RX ADMIN — IPRATROPIUM BROMIDE AND ALBUTEROL SULFATE 1 DOSE: 2.5; .5 SOLUTION RESPIRATORY (INHALATION) at 21:01

## 2024-01-15 RX ADMIN — METOPROLOL TARTRATE 25 MG: 25 TABLET, FILM COATED ORAL at 21:50

## 2024-01-15 RX ADMIN — Medication 3 MG: at 21:50

## 2024-01-15 RX ADMIN — AZITHROMYCIN MONOHYDRATE 500 MG: 500 INJECTION, POWDER, LYOPHILIZED, FOR SOLUTION INTRAVENOUS at 13:34

## 2024-01-15 RX ADMIN — PRAVASTATIN SODIUM 20 MG: 10 TABLET ORAL at 21:50

## 2024-01-15 RX ADMIN — RIVAROXABAN 15 MG: 15 TABLET, FILM COATED ORAL at 17:36

## 2024-01-15 NOTE — PLAN OF CARE
Problem: Discharge Planning  Goal: Discharge to home or other facility with appropriate resources  Outcome: Progressing  Flowsheets (Taken 1/15/2024 0900)  Discharge to home or other facility with appropriate resources: Identify barriers to discharge with patient and caregiver     Problem: Safety - Adult  Goal: Free from fall injury  Outcome: Progressing

## 2024-01-15 NOTE — FLOWSHEET NOTE
In to see patient, alert and oriented, medications provided, assessment completed. Discussed plan of care with patient

## 2024-01-15 NOTE — CARE COORDINATION
Case Management Assessment  Initial Evaluation    Date/Time of Evaluation: 1/15/2024 9:40 AM  Assessment Completed by: JOS Fritz    If patient is discharged prior to next notation, then this note serves as note for discharge by case management.    Patient Name: Marya Louis                   YOB: 1926  Diagnosis: Septicemia (HCC) [A41.9]  Elevated troponin [R79.89]  Pneumonia of both lungs due to infectious organism, unspecified part of lung [J18.9]  Leukocytosis, unspecified type [D72.829]  Community acquired pneumonia, unspecified laterality [J18.9]                   Date / Time: 1/12/2024  6:31 PM    Patient Admission Status: Inpatient   Readmission Risk (Low < 19, Mod (19-27), High > 27): Readmission Risk Score: 18.6    Current PCP: Renetta Irby APRN - CNP  PCP verified by CM? Yes    Chart Reviewed: Yes      History Provided by: Patient, Significant Other  Patient Orientation: Alert and Oriented    Patient Cognition: Alert    Hospitalization in the last 30 days (Readmission):  No    If yes, Readmission Assessment in CM Navigator will be completed.    Advance Directives:      Code Status: Full Code   Patient's Primary Decision Maker is: Named in Scanned ACP Document    Primary Decision Maker: Caty Rush - Niece/Nephew - [phone]    Discharge Planning:    Patient lives with: Alone Type of Home: House  Primary Care Giver: Self  Patient Support Systems include: Family Members, Other (Comment) ([employer]OM[employer] MEALS)   Current Financial resources: Medicare  Current community resources: ECF/Home Care  Current services prior to admission: Meals On Wheels            Current DME:              Type of Home Care services:  None    ADLS  Prior functional level: Mobility (Pt uses walker)  Current functional level:      PT AM-PAC: 17 /24  OT AM-PAC: 16 /24    Family can provide assistance at DC: Yes  Would you like Case Management to discuss the discharge plan with any other family members/significant

## 2024-01-15 NOTE — DISCHARGE INSTRUCTIONS
Heart Failure Resources:  Heart Failure Interactive Workbook:  Go to https://C[employer]ks for a [employer]GridNetworks.W4/publication/?r=884843 for a Free Heart Failure Interactive Workbook provided by The American [employer] Association. This interactive workbook will provide information on Healthier Living with Heart Failure. Please copy and paste link into search bar. Use your mouse to scroll through the pages.    HF [place] kellen:   Heart Failure Free smart phone kellen available for iPhone and Android download. Use your phone to track sodium intake, fluid intake, symptoms, and weight.     Low Sodium Diet / Recipes:  Go to www.[employer]ratos website for “renal” diet which is Low Sodium! [employer] is a dialysis company, but this website offers free seasonal cookbooks. Each quarter, they will release 25-30 new recipes with a breakdown of calories, sodium, and glucose. You can also go to wwwDebteye/recipes website for free recipes.     Discharge Instruction Video:  Scan the QR code below with your camera and click the canva.com link to open the video and watch educational information on Heart Failure and Medications from one of our nurses.   https://www.[employer]/design/DAFZnsH_JRk/6AtctenTSKKzaCAymH8zwd/edit    Home Exercise Program:   Identification of Green/Yellow/Red zones:  You should be able to identify when you feel good (green zone), if you have 1-2 symptoms of HF (yellow zone), or if you are in need of medical attention (red zone).  In your CHF education folder you were provided a “stop light tool” to outline this information.     We want to you to rate your exertion levels:    Our therapy team has discussed means of identification with you such as the \"Chano scale.\"  The Chano rating scale ranges from 6 to 20, where 6 means \"no exertion at all\" and 20 means \"maximal exertion.\" The goal is to use this to gauge how much effort it is taking for you to do your normal daily tasks.   You should be able to recognize when too much exertion is

## 2024-01-16 VITALS
DIASTOLIC BLOOD PRESSURE: 63 MMHG | HEART RATE: 76 BPM | RESPIRATION RATE: 14 BRPM | TEMPERATURE: 98.3 F | OXYGEN SATURATION: 93 % | WEIGHT: 137.2 LBS | SYSTOLIC BLOOD PRESSURE: 106 MMHG | BODY MASS INDEX: 25.92 KG/M2

## 2024-01-16 LAB
BACTERIA BLD CULT ORG #2: NORMAL
BACTERIA BLD CULT: NORMAL

## 2024-01-16 PROCEDURE — 94761 N-INVAS EAR/PLS OXIMETRY MLT: CPT

## 2024-01-16 PROCEDURE — 99239 HOSP IP/OBS DSCHRG MGMT >30: CPT | Performed by: INTERNAL MEDICINE

## 2024-01-16 PROCEDURE — 6370000000 HC RX 637 (ALT 250 FOR IP)

## 2024-01-16 PROCEDURE — 6370000000 HC RX 637 (ALT 250 FOR IP): Performed by: INTERNAL MEDICINE

## 2024-01-16 PROCEDURE — 94640 AIRWAY INHALATION TREATMENT: CPT

## 2024-01-16 RX ORDER — CEFDINIR 300 MG/1
300 CAPSULE ORAL 2 TIMES DAILY
Qty: 8 CAPSULE | Refills: 0
Start: 2024-01-16 | End: 2024-01-20

## 2024-01-16 RX ORDER — FUROSEMIDE 40 MG/1
20 TABLET ORAL DAILY
Qty: 1 TABLET | Refills: 0
Start: 2024-01-16

## 2024-01-16 RX ADMIN — METOPROLOL TARTRATE 25 MG: 25 TABLET, FILM COATED ORAL at 09:45

## 2024-01-16 RX ADMIN — IPRATROPIUM BROMIDE AND ALBUTEROL SULFATE 1 DOSE: 2.5; .5 SOLUTION RESPIRATORY (INHALATION) at 07:31

## 2024-01-16 NOTE — CARE COORDINATION
DISCHARGE ORDER  Date/Time 2024 9:07 AM  Completed by: JOS Fritz, Case Management    Patient Name: Marya Louis    : 1926      Admit order Date and Status:2024  Noted discharge order. (verify MD's last order for status of admission/Traditional Medicare 3 [place] Inpatient qualifying stay required for SNF)    Confirmed discharge plan with:              Patient:  Yes              When pt confirms DC plan does any support person need to be contacted by CM Yes if yes who Gabriela Rush___                      Discharge to Facility: [facility]   Facility phone number for staff giving report: [phone]   Pre-certification completed: YES   Hospital Exemption Notification (HENS) completed: YES   Discharge orders and Continuity of Care faxed to facility:  YES      Transportation:               Medical Transport explained with choice list offered to pt/family.                Choice:Yes(no preference)  Agency used: [facility]   time:   11:30      Pt/family/Nursing/Facility aware of  time: 1130  Yes Names: CECILIA, GABRIELA, MARYA, DEDE  Ambulance form completed:  YES:      Date Last IMM Given: 2024    Comments:Spoke with Cecilia at [facility] and Gabriela Rush(NOK) gave d/c date and transport time. Completed IMM.    Pt is being d/c'd to [facility] today. Pt's O2 sats are 93% on RA.    Discharge timeout done with CM/PT/RN. All discharge needs and concerns addressed.    Discharging nurse to complete NIURKA, reconcile AVS, and place final copy with patient's discharge packet. Discharging RN to ensure that written prescriptions for  Level II medications are sent with patient to the facility as per protocol.

## 2024-01-16 NOTE — PROGRESS NOTES
01/13/24 2100   RT Protocol   History Pulmonary Disease 2   Respiratory pattern 2   Breath sounds 2   Cough 0   Indications for Bronchodilator Therapy Decreased or absent breath sounds   Bronchodilator Assessment Score 6     RT Inhaler-Nebulizer Bronchodilator Protocol Note    There is a bronchodilator order in the chart from a provider indicating to follow the RT Bronchodilator Protocol and there is an “Initiate RT Inhaler-Nebulizer Bronchodilator Protocol” order as well (see protocol at bottom of note).    CXR Findings:  XR CHEST PORTABLE    Result Date: 1/12/2024  Bibasilar airspace disease which could represent atelectasis and/or developing pneumonia       The findings from the last RT Protocol Assessment were as follows:   History Pulmonary Disease: Chronic pulmonary disease  Respiratory Pattern: Dyspnea on exertion or RR 21-25 bpm  Breath Sounds: Slightly diminished and/or crackles  Cough: Strong, spontaneous, non-productive  Indication for Bronchodilator Therapy: Decreased or absent breath sounds  Bronchodilator Assessment Score: 6    Aerosolized bronchodilator medication orders have been revised according to the RT Inhaler-Nebulizer Bronchodilator Protocol below.    Respiratory Therapist to perform RT Therapy Protocol Assessment initially then follow the protocol.  Repeat RT Therapy Protocol Assessment PRN for score 0-3 or on second treatment, BID, and PRN for scores above 3.    No Indications - adjust the frequency to every 6 hours PRN wheezing or bronchospasm, if no treatments needed after 48 hours then discontinue using Per Protocol order mode.     If indication present, adjust the RT bronchodilator orders based on the Bronchodilator Assessment Score as indicated below.  Use Inhaler orders unless patient has one or more of the following: on home nebulizer, not able to hold breath for 10 seconds, is not alert and oriented, cannot activate and use MDI correctly, or respiratory rate 25 breaths per minute 
Bedside report and transfer of care given to RAMON Sigala. Pt currently resting in bed with the call light within reach. Pt denies any other care needs at this time. Pt stable at this time.   
Culture results have posted     Patient is on Ceftriaxone + Zithromax- organism covered         Specimen: Sputum Expectorated Updated: 01/14/24 1318      Organism Haemophilus influenzae DNA Detected Abnormal     Pneumonia Panel Molecular --    >=10,000,000 copies/mL  See additional report for complete Pneumonia panel.   Narrative:       Yury Simmons RPH PharmD 1/14/2024 1:39 PM    
In to see patient.  Patient sleeping in recliner, call light witihn reach.   
Inpatient Occupational Therapy Evaluation and Treatment    Unit: PCU  Date:  1/14/2024  Patient Name:    Marya Louis  Admitting diagnosis:  Septicemia (HCC) [A41.9]  Elevated troponin [R79.89]  Pneumonia of both lungs due to infectious organism, unspecified part of lung [J18.9]  Leukocytosis, unspecified type [D72.829]  Community acquired pneumonia, unspecified laterality [J18.9]  Admit Date:  1/12/2024  Precautions/Restrictions/WB Status/ Lines/ Wounds/ Oxygen: Fall risk, Bed/chair alarm, Lines (IV, Supplemental O2 (4L), and external catheter), [place] (hard of hearing), and Telemetry    Pt seen for cotreatment this date due to patient safety, patient endurance, complexity of condition, and need for the assistance of 2 skilled therapists    Treatment Time:  5846-8024  Treatment Number:  1  Timed Code Treatment Minutes: 53 minutes  Total Treatment Minutes:  63  minutes    Patient Goals for Therapy: \"Go home \"          Discharge Recommendations: SNF  DME needs for discharge: Defer to facility       Therapy recommendations for staff:   Assist of 1 for transfers with use of rolling walker (RW) and gait belt to/from BSC  to/from chair    History of Present Illness: Ms. Louis was admitted for shortness of breath.  She has a past medical history of CHF, CAD, dementia, A-fib, hyperlipidemia and GERD.  She has known COPD.  She came to the emergency room with complaints of cough for few days.  She was found to have acute respiratory failure and placed on oxygen.  She is a former smoker.  She does not use oxygen at baseline.     This morning she is feeling some better.  She is still on oxygen.  Her heart rate is elevated this morning but is improved at present.    Home Health S4 Level Recommendation:  NA    AM-PAC Score: AM-PAC Inpatient Daily Activity Raw Score: 16     Subjective:  Patient lying reclined in bed with no family present.   Pt agreeable to this OT session.     Cognition:    A&O Person and Place   Able to follow 1 
Macy (niece) called and update was given. Halley Jimenez RN    
Patient admitted to room 306 from ED. Patient oriented to room, call light, bed rails, phone, lights and bathroom. Patient instructed about the schedule of the day including: vital sign frequency, lab draws, possible tests, frequency of MD and staff rounds, daily weights, I &O's and prescribed diet.  Telemetry box in place, patient aware of placement and reason. Bed locked, in lowest position, side rails up 2/4, call light within reach.        Recliner Assessment  Patient is not able to demonstrate the ability to move from a reclining position to an upright position within the recliner due to weakness.       4 Eyes Skin Assessment     NAME:  Marya Louis  YOB: 1926  MEDICAL RECORD NUMBER:  [id number]    The patient is being assessed for  Admission    I agree that at least one RN has performed a thorough Head to Toe Skin Assessment on the patient. ALL assessment sites listed below have been assessed.                Areas assessed by both nurses: Dominik  Stage 2 to coccyx, large swollen closed nodule to right underarm, scattered bruising and abrasions    Head, Face, Ears, Shoulders, Back, Chest, Arms, Elbows, Hands, Sacrum. Buttock, Coccyx, Ischium, Legs. Feet and Heels, and Under Medical Devices         Does the Patient have a Wound? Yes wound(s) were present on assessment. LDA wound assessment was Initiated and completed by RN       Cody Prevention initiated by RN: Yes  Wound Care Orders initiated by RN: No    Pressure Injury (Stage 3,4, Unstageable, DTI, NWPT, and Complex wounds) if present, place Wound referral order by RN under : No   stage 2 non blanching redness    New Ostomies, if present place, Ostomy referral order under : No     Nurse 1 eSignature: Electronically signed by Kalyn West RN on 1/13/24 at 5:10 PM EST    **SHARE this note so that the co-signing nurse can place an eSignature**    Nurse 2 eSignature: Electronically signed by Sun FRAZIER 
Patient in stable condition. Transferred care to RAMON Mccain.    
Patient refusing to get up from chair.  Patient believes she is being put in senior living if she stands up, refusing to change purewick.  Patient become agitated with plan of care and then refused to talk to this RN.  Patient is dry, however, encouraged change of position and toiletry.   
Progress Note    Admit Date:  1/12/2024    Subjective:  Ms. Louis was admitted for shortness of breath.  She has a past medical history of CHF, CAD, dementia, A-fib, hyperlipidemia and GERD.  She has known COPD.  She came to the emergency room with complaints of cough for few days.  She was found to have acute respiratory failure and placed on oxygen.  She is a former smoker.  She does not use oxygen at baseline.    This morning she is feeling better.  She is  now on RA   Objective:   BP 99/60   Pulse 68   Temp 97.3 °F (36.3 °C) (Axillary)   Resp 22   Wt 62.2 kg (137 lb 3.2 oz)   SpO2 93%   BMI 25.92 kg/m²        Intake/Output Summary (Last 24 hours) at 1/16/2024 0814  Last data filed at 1/15/2024 1820  Gross per 24 hour   Intake 940 ml   Output 575 ml   Net 365 ml         Physical Exam:    General appearance: alert, appears stated age and cooperative  Head: Normocephalic, without obvious abnormality, atraumatic  Eyes: conjunctivae/corneas clear. PERRL, EOM's intact.  Neck: no adenopathy, no carotid bruit, no JVD, supple, symmetrical, trachea midline and thyroid not enlarged, symmetric, no tenderness/mass/nodules  Lungs: No accessory muscle use.  No  wheezing both lungs.  Bilateral crackles.  No rhonchi.  Heart: irregular rate and rhythm, S1, S2 normal, no murmur, click, rub or gallop  Abdomen: soft, non-tender; bowel sounds normal; no masses,  no organomegaly  Extremities: extremities normal, atraumatic, no cyanosis or edema  Pulses: 2+ and symmetric  Skin: Skin color, texture, turgor normal. No rashes or lesions  Neurologic: Grossly normal.  Moves all 4 extremities.  Cranial nerves II through XII normal.  Psychiatric: Alert mildly disoriented    Scheduled Meds:   cefTRIAXone (ROCEPHIN) IV  1,000 mg IntraVENous Q24H    azithromycin  500 mg IntraVENous Q24H    sodium chloride flush  10 mL IntraVENous 2 times per day    melatonin  3 mg Oral Nightly    donepezil  5 mg Oral Nightly    [Held by provider] 
Progress Note    Admit Date:  1/12/2024    Subjective:  Ms. Louis was admitted for shortness of breath.  She has a past medical history of CHF, CAD, dementia, A-fib, hyperlipidemia and GERD.  She has known COPD.  She came to the emergency room with complaints of cough for few days.  She was found to have acute respiratory failure and placed on oxygen.  She is a former smoker.  She does not use oxygen at baseline.    This morning she is feeling some better.  She is on 3 L of oxygen.    Objective:   /66   Pulse 68   Temp 97 °F (36.1 °C) (Axillary)   Resp 20   Wt 62.2 kg (137 lb 3.2 oz)   SpO2 98%   BMI 25.92 kg/m²        Intake/Output Summary (Last 24 hours) at 1/15/2024 0752  Last data filed at 1/14/2024 1810  Gross per 24 hour   Intake 1134 ml   Output 450 ml   Net 684 ml         Physical Exam:    General appearance: alert, appears stated age and cooperative  Head: Normocephalic, without obvious abnormality, atraumatic  Eyes: conjunctivae/corneas clear. PERRL, EOM's intact.  Neck: no adenopathy, no carotid bruit, no JVD, supple, symmetrical, trachea midline and thyroid not enlarged, symmetric, no tenderness/mass/nodules  Lungs: No accessory muscle use.  Mild wheezing both lungs.  Bilateral crackles.  No rhonchi.  Heart: irregular rate and rhythm, S1, S2 normal, no murmur, click, rub or gallop  Abdomen: soft, non-tender; bowel sounds normal; no masses,  no organomegaly  Extremities: extremities normal, atraumatic, no cyanosis or edema  Pulses: 2+ and symmetric  Skin: Skin color, texture, turgor normal. No rashes or lesions  Neurologic: Grossly normal.  Moves all 4 extremities.  Cranial nerves II through XII normal.  Psychiatric: Alert mildly disoriented    Scheduled Meds:   cefTRIAXone (ROCEPHIN) IV  1,000 mg IntraVENous Q24H    azithromycin  500 mg IntraVENous Q24H    sodium chloride flush  10 mL IntraVENous 2 times per day    melatonin  3 mg Oral Nightly    nicotine  1 patch TransDERmal Daily    
RT Inhaler-Nebulizer Bronchodilator Protocol Note    There is a bronchodilator order in the chart from a provider indicating to follow the RT Bronchodilator Protocol and there is an “Initiate RT Inhaler-Nebulizer Bronchodilator Protocol” order as well (see protocol at bottom of note).    CXR Findings:  No results found.    The findings from the last RT Protocol Assessment were as follows:   History Pulmonary Disease: Chronic pulmonary disease  Respiratory Pattern: Dyspnea on exertion or RR 21-25 bpm  Breath Sounds: Slightly diminished and/or crackles  Cough: Strong, spontaneous, non-productive  Indication for Bronchodilator Therapy: Decreased or absent breath sounds  Bronchodilator Assessment Score: 6    Aerosolized bronchodilator medication orders have been revised according to the RT Inhaler-Nebulizer Bronchodilator Protocol below.    Respiratory Therapist to perform RT Therapy Protocol Assessment initially then follow the protocol.  Repeat RT Therapy Protocol Assessment PRN for score 0-3 or on second treatment, BID, and PRN for scores above 3.    No Indications - adjust the frequency to every 6 hours PRN wheezing or bronchospasm, if no treatments needed after 48 hours then discontinue using Per Protocol order mode.     If indication present, adjust the RT bronchodilator orders based on the Bronchodilator Assessment Score as indicated below.  Use Inhaler orders unless patient has one or more of the following: on home nebulizer, not able to hold breath for 10 seconds, is not alert and oriented, cannot activate and use MDI correctly, or respiratory rate 25 breaths per minute or more, then use the equivalent nebulizer order(s) with same Frequency and PRN reasons based on the score.  If a patient is on this medication at home then do not decrease Frequency below that used at home.    0-3 - enter or revise RT bronchodilator order(s) to equivalent RT Bronchodilator order with Frequency of every 4 hours PRN for wheezing 
RT Inhaler-Nebulizer Bronchodilator Protocol Note    There is a bronchodilator order in the chart from a provider indicating to follow the RT Bronchodilator Protocol and there is an “Initiate RT Inhaler-Nebulizer Bronchodilator Protocol” order as well (see protocol at bottom of note).    CXR Findings:  No results found.    The findings from the last RT Protocol Assessment were as follows:   History Pulmonary Disease: Chronic pulmonary disease  Respiratory Pattern: Regular pattern and RR 12-20 bpm  Breath Sounds: Slightly diminished and/or crackles  Cough: Strong, spontaneous, non-productive  Indication for Bronchodilator Therapy: Decreased or absent breath sounds  Bronchodilator Assessment Score: 4    Aerosolized bronchodilator medication orders have been revised according to the RT Inhaler-Nebulizer Bronchodilator Protocol below.    Respiratory Therapist to perform RT Therapy Protocol Assessment initially then follow the protocol.  Repeat RT Therapy Protocol Assessment PRN for score 0-3 or on second treatment, BID, and PRN for scores above 3.    No Indications - adjust the frequency to every 6 hours PRN wheezing or bronchospasm, if no treatments needed after 48 hours then discontinue using Per Protocol order mode.     If indication present, adjust the RT bronchodilator orders based on the Bronchodilator Assessment Score as indicated below.  Use Inhaler orders unless patient has one or more of the following: on home nebulizer, not able to hold breath for 10 seconds, is not alert and oriented, cannot activate and use MDI correctly, or respiratory rate 25 breaths per minute or more, then use the equivalent nebulizer order(s) with same Frequency and PRN reasons based on the score.  If a patient is on this medication at home then do not decrease Frequency below that used at home.    0-3 - enter or revise RT bronchodilator order(s) to equivalent RT Bronchodilator order with Frequency of every 4 hours PRN for wheezing or 
RT Inhaler-Nebulizer Bronchodilator Protocol Note    There is a bronchodilator order in the chart from a provider indicating to follow the RT Bronchodilator Protocol and there is an “Initiate RT Inhaler-Nebulizer Bronchodilator Protocol” order as well (see protocol at bottom of note).    CXR Findings:  XR CHEST PORTABLE    Result Date: 1/12/2024  Bibasilar airspace disease which could represent atelectasis and/or developing pneumonia       The findings from the last RT Protocol Assessment were as follows:   History Pulmonary Disease: (P) Chronic pulmonary disease  Respiratory Pattern: (P) Dyspnea on exertion or RR 21-25 bpm  Breath Sounds: (P) Slightly diminished and/or crackles  Cough: (P) Strong, spontaneous, non-productive  Indication for Bronchodilator Therapy: (P) Decreased or absent breath sounds  Bronchodilator Assessment Score: (P) 6    Aerosolized bronchodilator medication orders have been revised according to the RT Inhaler-Nebulizer Bronchodilator Protocol below.    Respiratory Therapist to perform RT Therapy Protocol Assessment initially then follow the protocol.  Repeat RT Therapy Protocol Assessment PRN for score 0-3 or on second treatment, BID, and PRN for scores above 3.    No Indications - adjust the frequency to every 6 hours PRN wheezing or bronchospasm, if no treatments needed after 48 hours then discontinue using Per Protocol order mode.     If indication present, adjust the RT bronchodilator orders based on the Bronchodilator Assessment Score as indicated below.  Use Inhaler orders unless patient has one or more of the following: on home nebulizer, not able to hold breath for 10 seconds, is not alert and oriented, cannot activate and use MDI correctly, or respiratory rate 25 breaths per minute or more, then use the equivalent nebulizer order(s) with same Frequency and PRN reasons based on the score.  If a patient is on this medication at home then do not decrease Frequency below that used at 
RT Inhaler-Nebulizer Bronchodilator Protocol Note    There is a bronchodilator order in the chart from a provider indicating to follow the RT Bronchodilator Protocol and there is an “Initiate RT Inhaler-Nebulizer Bronchodilator Protocol” order as well (see protocol at bottom of note).    CXR Findings:  XR CHEST PORTABLE    Result Date: 1/12/2024  Bibasilar airspace disease which could represent atelectasis and/or developing pneumonia       The findings from the last RT Protocol Assessment were as follows:   History Pulmonary Disease: Chronic pulmonary disease  Respiratory Pattern: Dyspnea on exertion or RR 21-25 bpm  Breath Sounds: Slightly diminished and/or crackles  Cough: Strong, spontaneous, non-productive  Indication for Bronchodilator Therapy: Decreased or absent breath sounds  Bronchodilator Assessment Score: 6    Aerosolized bronchodilator medication orders have been revised according to the RT Inhaler-Nebulizer Bronchodilator Protocol below.    Respiratory Therapist to perform RT Therapy Protocol Assessment initially then follow the protocol.  Repeat RT Therapy Protocol Assessment PRN for score 0-3 or on second treatment, BID, and PRN for scores above 3.    No Indications - adjust the frequency to every 6 hours PRN wheezing or bronchospasm, if no treatments needed after 48 hours then discontinue using Per Protocol order mode.     If indication present, adjust the RT bronchodilator orders based on the Bronchodilator Assessment Score as indicated below.  Use Inhaler orders unless patient has one or more of the following: on home nebulizer, not able to hold breath for 10 seconds, is not alert and oriented, cannot activate and use MDI correctly, or respiratory rate 25 breaths per minute or more, then use the equivalent nebulizer order(s) with same Frequency and PRN reasons based on the score.  If a patient is on this medication at home then do not decrease Frequency below that used at home.    0-3 - enter or 
Report called to Judith hyatt nurse at [facility] at [phone]. Judith denies questions at this time. PCU nurse station number provided.     IV removed without difficulty and dry dressing in place. Telemetry monitor removed and returned to [facility]. Pt left facility [facility] condition to [facility] with all of their personal belongings.       
Report given to Kalyn PCU nurse. Patient left floor via cart    
Report given to RAMON Alcantar.  Transfer of care at this time.   
Shift assessment complete - see flow sheet, pt denies needs, call light within reach.   
Shift assessment complete, morning medications given, patient resting with no complaints of pain, heart rate elevated due to A-fib, beta blockers have been held will speak to physician concerning rate, will continue to monitor. Kalyn West RN      
Shift report given to nurse Halley at bedside. Patient care handed off in stable condition at this time. Kalyn West RN      
Shift report given to nurse Sigala at bedside. Patient care handed off in stable condition at this time. Kalyn West RN        
 4-   Hamstring 4-   Iliopsoas 3+    Lower Extremity Sensation    WFL    Coordination  WFL    Tone  Not Tested    Balance  Static Sitting:  Good - ; SBA  Dynamic Sitting:  Fair +; SBA   Comments:     Static Standing: Fair ; CGA  Dynamic Standing: Fair -; CGA  Comments:     Posture  Seated: Forward head and neck and Thoracic kyphosis  Standing: Forward head and neck and Thoracic kyphosis    Bed Mobility   Supine to Sit:    Min A   Sit to Supine:   Not Tested  Rolling:   Not Tested   Scooting in sitting: SBA   Scooting in supine:  Not Tested   Bridging:  Not Tested    Transfer Training     Sit to stand:   Min A    Stand to sit:   Min A    Bed to/from Chair:  Min A  with use of gait belt and rolling walker (RW)    Gait gait completed as indicated below  Distance:      5 ft  Deviations (firm surface/linoleum):  decreased roberth, shuffles, decreased foot clearance bilaterally, and decreased step length bilaterally  Assistive Device Used:    gait belt and rolling walker (RW)  Level of Assist:    Min A    Comment:     Stair Training deferred, pt unsafe/ not appropriate to complete stairs at this time  # of Steps:   N/A  Level of Assist:  Not Tested   UE Support:  NA  Assistive Device:  N/A  Pattern:   N/A  Comments:      Therapeutic Exercises Initiated  all completed bilaterally unless indicated  Supine:  N/A    Seated:  Ankle pumps: 10 reps  Marching: 10 reps  LAQ: 10 reps    Standing:  N/A    Activity Tolerance   During therapy session noted pt with dizziness/lightheadedness  see vitals chart below for details    Pt Position BP (mmHg) HR (bpm) SpO2 (%) on 4L  Comments   Supine at rest 99/58 82 100    Seated at /67      After transfer to chair  123/72      End of session         Positioning Needs   Pt up in chair, alarm set, positioned in proper neutral alignment and pressure relief provided.   Call light provided and all needs within reach    Other Activities  None.    Patient/Family Education   Pt educated on 
escalate care:    [] Major surgery/procedure with associated risk factors:    ----------------------------------------------------------------------  C. Data (any 2)  [] Discussed current management and discharge planning options with Case Management.  [] Discussed management of the case with:  [x] Telemetry personally reviewed and interpreted as documented above    [x] Imaging personally reviewed and interpreted, includes:    [] Data Review (any 3)  [] Collateral history obtained from:    [] All available Consultant notes from yesterday/today were reviewed  [x] All current labs were reviewed and interpreted for clinical significance   [x] Appropriate follow-up labs were ordered      ALEJANDRA HYMAN MD 1/14/2024 11:33 AM

## 2024-01-16 NOTE — DISCHARGE SUMMARY
Name:  Marya Louis  Room:  /0306-01  MRN:    [id number]    Discharge Summary      This discharge summary is in conjunction with a complete physical exam done on the day of discharge.      Discharging Physician: CAT VALLEJO MD      Admit: 1/12/2024  Discharge:  1/16/2024     Diagnoses this Admission    Principal Problem:    Pneumonia of both lungs due to infectious organism  Active Problems:    Atrial fibrillation (HCC)    Elevated troponin    Leukocytosis    Septicemia (HCC)    Acute respiratory failure with hypoxia (HCC)  Resolved Problems:    * No resolved hospital problems. *          Procedures (Please Review Full Report for Details)      Consults    PHARMACY TO DOSE VANCOMYCIN      HPI:    The patient is a 89 y.o. female with pmhx of CHF, atrial fibrillation, CAD,dementia who presented to [facility] ED with complaint of cough for the past couple days, has been productive with green phlegm. Also had sore throat for couple days but that has since resolved. No fever or chills. No chest pain or tightness.   No known sick contacts. Does have hx of tobacco use but none currently. Does not wear oxygen at home.   Lives alone. Has been complaint with all home medications. At baseline dry weight.           Hospital Course      # Pneumonia likely from gram-positive organism  #Acute hypoxic respiratory failure   #Sepsis criteria-POA  -HR, BP, WBC, procalc, +source   -no baseline oxygen requirements, currently on 4 L O2-->3L  -wean as tolerated   -IV vanc, cefepime for gram pos/neg/MRSA coverage was started at the time of admission.  We can de-escalate to Rocephin and Zithromax.  -MRSA, legionella, strep, sputum cultures, pna panel - H. flu  -blood cultures negative  -VBG fine  -breathing txs   Now on RA      #Hypotension resolved.  -likely 2/2 to above and reduced PO intake  -given gentle IVF bolus   -She takes metoprolol at home which was held as her blood pressure soft at the time of admission.

## 2024-01-17 ENCOUNTER — CARE COORDINATION (OUTPATIENT)
Dept: CARE COORDINATION | Age: 89
End: 2024-01-17

## 2024-01-25 DIAGNOSIS — R05.9 COUGH: ICD-10-CM

## 2024-01-25 DIAGNOSIS — R09.89 CHEST CONGESTION: ICD-10-CM

## 2024-01-25 RX ORDER — FEXOFENADINE HCL 180 MG/1
TABLET ORAL
Qty: 30 TABLET | Refills: 5 | Status: SHIPPED | OUTPATIENT
Start: 2024-01-25

## 2024-01-25 RX ORDER — FUROSEMIDE 40 MG/1
40 TABLET ORAL DAILY
Qty: 90 TABLET | Refills: 3 | OUTPATIENT
Start: 2024-01-25

## 2024-01-29 ENCOUNTER — CARE COORDINATION (OUTPATIENT)
Dept: CASE MANAGEMENT | Age: 89
End: 2024-01-29

## 2024-01-29 NOTE — CARE COORDINATION
Care Transitions Post-Acute Facility Update Call    2024    Patient: Marya Louis Patient : 1926   MRN: [id number]  Reason for Admission:   Discharge Date: 24 RARS: Readmission Risk Score: 19.2    Per patientping patient patient  on 24 at [facility].

## 2024-02-08 ENCOUNTER — OFFICE VISIT (OUTPATIENT)
Dept: ORTHOPEDIC SURGERY | Age: 89
End: 2024-02-08

## 2024-02-08 ENCOUNTER — OFFICE VISIT (OUTPATIENT)
Dept: FAMILY MEDICINE CLINIC | Age: 89
End: 2024-02-08

## 2024-02-08 VITALS
WEIGHT: 141 LBS | DIASTOLIC BLOOD PRESSURE: 64 MMHG | BODY MASS INDEX: 26.64 KG/M2 | SYSTOLIC BLOOD PRESSURE: 100 MMHG | OXYGEN SATURATION: 95 % | HEART RATE: 77 BPM

## 2024-02-08 VITALS — BODY MASS INDEX: 26.62 KG/M2 | HEIGHT: 61 IN | WEIGHT: 141 LBS

## 2024-02-08 DIAGNOSIS — M17.11 PRIMARY OSTEOARTHRITIS OF RIGHT KNEE: Primary | ICD-10-CM

## 2024-02-08 DIAGNOSIS — A41.9 SEPTICEMIA (HCC): ICD-10-CM

## 2024-02-08 DIAGNOSIS — R79.89 ELEVATED TROPONIN: ICD-10-CM

## 2024-02-08 DIAGNOSIS — J18.9 PNEUMONIA OF BOTH LOWER LOBES DUE TO INFECTIOUS ORGANISM: ICD-10-CM

## 2024-02-08 DIAGNOSIS — Z09 HOSPITAL DISCHARGE FOLLOW-UP: Primary | ICD-10-CM

## 2024-02-08 DIAGNOSIS — D72.829 LEUKOCYTOSIS, UNSPECIFIED TYPE: ICD-10-CM

## 2024-02-08 RX ORDER — TRIAMCINOLONE ACETONIDE 40 MG/ML
40 INJECTION, SUSPENSION INTRA-ARTICULAR; INTRAMUSCULAR ONCE
Status: COMPLETED | OUTPATIENT
Start: 2024-02-08 | End: 2024-02-08

## 2024-02-08 RX ORDER — FUROSEMIDE 40 MG/1
20 TABLET ORAL DAILY
Qty: 1 TABLET | Refills: 0 | Status: SHIPPED | OUTPATIENT
Start: 2024-02-08

## 2024-02-08 RX ORDER — BUPIVACAINE HYDROCHLORIDE 2.5 MG/ML
7 INJECTION, SOLUTION INFILTRATION; PERINEURAL ONCE
Status: COMPLETED | OUTPATIENT
Start: 2024-02-08 | End: 2024-02-08

## 2024-02-08 RX ADMIN — BUPIVACAINE HYDROCHLORIDE 17.5 MG: 2.5 INJECTION, SOLUTION INFILTRATION; PERINEURAL at 13:46

## 2024-02-08 RX ADMIN — TRIAMCINOLONE ACETONIDE 40 MG: 40 INJECTION, SUSPENSION INTRA-ARTICULAR; INTRAMUSCULAR at 13:45

## 2024-02-08 NOTE — PROGRESS NOTES
Recommendation is for a cortisone injection into the right knee. After informed consent was received from the patient, the right knee was injected with 1 mL of 40mg/ml of Kenalog  and 4 mL of 0.25% Marcaine  in the syringe from an anterolateral joint line approach, using a 25-gauge needle, under sterile Betadine prep, using ethyl chloride as a topical refrigerant, for a diagnosis of osteoarthritis.   The patient appeared to tolerate it well.   The patient should return here periodically as needed.    No diagnosis found.     No orders of the defined types were placed in this encounter.

## 2024-02-08 NOTE — PROGRESS NOTES
Pneumonia of both lungs due to infectious organism    Elevated troponin    Leukocytosis    Septicemia (HCC)    Acute respiratory failure with hypoxia (HCC)       Medications listed as ordered at the time of discharge from hospital     Medication List            Accurate as of February 8, 2024 12:23 PM. If you have any questions, ask your nurse or doctor.                CONTINUE taking these medications      ACIDOPHILUS EXTRA STRENGTH PO     CENTRUM ADULTS PO     docusate 100 MG Caps  Commonly known as: COLACE, DULCOLAX  Take 100 mg by mouth 2 times daily as needed for Constipation Hold for loose stools or diarrhea.     donepezil 5 MG tablet  Commonly known as: ARICEPT  TAKE ONE (1) TABLET BY MOUTH NIGHTLY     fexofenadine 180 MG tablet  Commonly known as: ALLEGRA  TAKE ONE (1) TABLET BY MOUTH EVERY DAY     furosemide 40 MG tablet  Commonly known as: LASIX  Take 0.5 tablets by mouth daily     magnesium oxide 400 (240 Mg) MG tablet  Commonly known as: MAG-OX  TAKE ONE (1) TABLET BY MOUTH EVERY DAY     metoprolol tartrate 25 MG tablet  Commonly known as: LOPRESSOR  TAKE ONE (1) TABLET TWICE A DAY     omeprazole 20 MG delayed release capsule  Commonly known as: PRILOSEC  TAKE ONE (1) CAPSULE BY MOUTH EVERY DAY     oxyBUTYnin 5 MG tablet  Commonly known as: DITROPAN  TAKE ONE (1) TABLET BY MOUTH THREE TIMES A DAY AS NEEDED     pravastatin 20 MG tablet  Commonly known as: PRAVACHOL  TAKE 1 TABLET BY MOUTH DAILY     rivaroxaban 20 MG Tabs tablet  Commonly known as: Xarelto  TAKE ONE (1) TABLET BY MOUTH EVERY DAY               Where to Get Your Medications        These medications were sent to [address] [phone] -  [phone]  [address] [place] [address]      Phone: [phone]   furosemide 40 MG tablet           Medications marked \"taking\" at this time  Outpatient Medications Marked as Taking for the 2/8/24 encounter (Office Visit) with Renetta Irby APRN - [facility]   Medication

## 2024-02-12 ENCOUNTER — CARE COORDINATION (OUTPATIENT)
Dept: CARE COORDINATION | Age: 89
End: 2024-02-12

## 2024-02-12 PROBLEM — R79.89 ELEVATED TROPONIN: Status: RESOLVED | Noted: 2024-01-13 | Resolved: 2024-02-12

## 2024-02-12 NOTE — CARE COORDINATION
Ambulatory Care Coordination Note  2/12/2024    Patient Current Location:  Home: [address] [address]     ACM contacted the patient by telephone. Verified name with patient as identifiers. Provided introduction to self, and explanation of the ACM role.     Challenges to be reviewed by the provider   Additional needs identified to be addressed with provider: No  none               Method of communication with provider: none.    Spoke briefly with patient  Patient denies any needs or concerns  Patient had to end call because she has an appointment with her dentist  She agrees to f/u call next week.      ACM: Halley Ramirez RN    General Assessment    Do you have any symptoms that are causing concern?: No             Goals Addressed    None         Future Appointments   Date Time Provider Department Center   2/26/2024  1:00 PM Jose Ramon Beth MD [facility]   5/9/2024 12:00 PM Renetta Irby APRN - [facility] [place]

## 2024-02-19 DIAGNOSIS — R25.2 MUSCLE CRAMPS: ICD-10-CM

## 2024-02-19 RX ORDER — LANOLIN ALCOHOL/MO/W.PET/CERES
CREAM (GRAM) TOPICAL
Qty: 30 TABLET | Refills: 5 | Status: SHIPPED | OUTPATIENT
Start: 2024-02-19

## 2024-02-19 RX ORDER — FUROSEMIDE 20 MG/1
20 TABLET ORAL EVERY MORNING
Qty: 30 TABLET | Refills: 0 | Status: SHIPPED | OUTPATIENT
Start: 2024-02-19

## 2024-02-21 NOTE — PROGRESS NOTES
[facility]     Outpatient Follow Up Note    Subjective:  Marya Louis is here today for cardiology follow up of CAD without angina, permanent AF, HLD, bilat leg edema .  Recent onset getting worse. She lives alone niece who helps her.  C/o Pneumonia in January 2024     [place]:  Admitted 10/16/23 CHF.  Echo showed EF=55-65%. Indeterminate  DD.  RV moderately enlarged. LA moderately dilated. RA severely dilated. Moderate posterior mitral annular calcification.  Mild MR and CT.  Moderate TR.    Admitted 1/12/24 Pneumonia    Today, 2/26/24 her niece is present.  She states she is feeling good now.  Her niece takes her to the store but she cooks for herself. She likes to do her yardwork riding  doing her flower beds raking leaves.   Patient denies current edema, chest pain, sob, palpitations, dizziness or syncope.  Patient is taking all cardiac medications as prescribed and tolerates them well.      Patient is vaccinated against Covid. Moderna 2/2     PMH:   stable CAD, chronic afib rate controlled anticoagulated on treatment with statins for hyperlipidemia.    12 point ROS negative in all areas as listed below except as in [place]  Constitutional, EENT, pulmonary, GI, , Musculoskeletal, skin, neurological, hematological, endocrine, Psychiatric    Past Medical History:   Diagnosis Date    Atherosclerosis of native coronary artery of native heart without angina pectoris     cath 1/12 , 4/12 (occluded RCa with patent collateral flow    CHF (congestive heart failure) ([facility])     Chronic atrial fibrillation (HCC) 01/01/2012    Closed left hip fracture ([facility]) 01/01/2016    THR    COPD (chronic obstructive pulmonary disease) ([facility])     DDD (degenerative disc disease), cervical     GERD without esophagitis 01/01/2001    gerd with stricture    Hyperlipidemia     Osteopenia 08/01/2011    recheck DEXA 2014    Primary osteoarthritis involving multiple joints     Routine health maintenance     TAC 9/99, DEXA 03

## 2024-02-26 ENCOUNTER — OFFICE VISIT (OUTPATIENT)
Dept: CARDIOLOGY CLINIC | Age: 89
End: 2024-02-26
Payer: MEDICARE

## 2024-02-26 VITALS
DIASTOLIC BLOOD PRESSURE: 62 MMHG | HEIGHT: 61 IN | HEART RATE: 64 BPM | WEIGHT: 139 LBS | OXYGEN SATURATION: 98 % | BODY MASS INDEX: 26.24 KG/M2 | SYSTOLIC BLOOD PRESSURE: 118 MMHG

## 2024-02-26 DIAGNOSIS — E78.00 PURE HYPERCHOLESTEROLEMIA: ICD-10-CM

## 2024-02-26 DIAGNOSIS — I50.32 CHRONIC DIASTOLIC CONGESTIVE HEART FAILURE (HCC): Primary | ICD-10-CM

## 2024-02-26 DIAGNOSIS — R63.4 WEIGHT LOSS, UNINTENTIONAL: ICD-10-CM

## 2024-02-26 DIAGNOSIS — Z09 HOSPITAL DISCHARGE FOLLOW-UP: ICD-10-CM

## 2024-02-26 DIAGNOSIS — I48.21 PERMANENT ATRIAL FIBRILLATION (HCC): ICD-10-CM

## 2024-02-26 DIAGNOSIS — I25.10 ATHEROSCLEROSIS OF NATIVE CORONARY ARTERY OF NATIVE HEART WITHOUT ANGINA PECTORIS: ICD-10-CM

## 2024-02-26 PROCEDURE — 1123F ACP DISCUSS/DSCN MKR DOCD: CPT | Performed by: INTERNAL MEDICINE

## 2024-02-26 PROCEDURE — G8484 FLU IMMUNIZE NO ADMIN: HCPCS | Performed by: INTERNAL MEDICINE

## 2024-02-26 PROCEDURE — 1036F TOBACCO NON-USER: CPT | Performed by: INTERNAL MEDICINE

## 2024-02-26 PROCEDURE — 1090F PRES/ABSN URINE INCON ASSESS: CPT | Performed by: INTERNAL MEDICINE

## 2024-02-26 PROCEDURE — G8427 DOCREV CUR MEDS BY ELIG CLIN: HCPCS | Performed by: INTERNAL MEDICINE

## 2024-02-26 PROCEDURE — G8417 CALC BMI ABV UP PARAM F/U: HCPCS | Performed by: INTERNAL MEDICINE

## 2024-02-26 PROCEDURE — 99214 OFFICE O/P EST MOD 30 MIN: CPT | Performed by: INTERNAL MEDICINE

## 2024-02-26 PROCEDURE — 1111F DSCHRG MED/CURRENT MED MERGE: CPT | Performed by: INTERNAL MEDICINE

## 2024-02-26 RX ORDER — CIMETIDINE 300 MG/1
300 TABLET, FILM COATED ORAL 2 TIMES DAILY
COMMUNITY
End: 2024-02-26

## 2024-02-26 NOTE — PATIENT INSTRUCTIONS
Plan:  Labs reviewed in epic and discussed with patient.  Current medications reviewed.  Refills given as warranted.  I am ok with you eating to help keep your muscle strong and your strength up.  It is ok if you gain 5-10 pounds.  -healthy foods and protein will help you gain your strength back  No cardiac testing at this time.  Do not use the salt shaker to add any extra salt to your food.  Try to avoid pretzels, potato chips, pickles, soup or foods that are high in salt.  I do not recommend that you rake leaves.  There is too much dust in them.     Follow up with me in 4 months

## 2024-03-21 RX ORDER — FUROSEMIDE 20 MG/1
TABLET ORAL
Qty: 30 TABLET | Refills: 0 | Status: SHIPPED | OUTPATIENT
Start: 2024-03-21

## 2024-04-02 ENCOUNTER — TELEPHONE (OUTPATIENT)
Dept: FAMILY MEDICINE CLINIC | Age: 89
End: 2024-04-02

## 2024-04-22 RX ORDER — FUROSEMIDE 20 MG/1
TABLET ORAL
Qty: 30 TABLET | Refills: 0 | Status: SHIPPED | OUTPATIENT
Start: 2024-04-22

## 2024-04-22 RX ORDER — OMEPRAZOLE 20 MG/1
20 CAPSULE, DELAYED RELEASE ORAL DAILY
Qty: 90 CAPSULE | Refills: 1 | Status: SHIPPED | OUTPATIENT
Start: 2024-04-22

## 2024-04-22 RX ORDER — DONEPEZIL HYDROCHLORIDE 5 MG/1
5 TABLET, FILM COATED ORAL NIGHTLY
Qty: 90 TABLET | Refills: 1 | Status: SHIPPED | OUTPATIENT
Start: 2024-04-22

## 2024-05-01 NOTE — PATIENT INSTRUCTIONS
Please read the healthy family handout that you were given and share it with your family. Please compare this printed medication list with your medications at home to be sure they are the same. If you have any medications that are different please contact us immediately at [phone]. Also review your allergies that we have listed, these may also include medications that you have not been able to tolerate, make sure everything listed is correct. If you have any allergies that are different please contact us immediately at [phone]. Patient Education        Learning About Ptosis  What is ptosis? Ptosis (say \"ALL-maykel\") means that the upper eyelid droops in a way that's not normal. Some people are born with ptosis. Others may get it later in life. It may be caused by problems with the muscles or nerves that help move the eyelid. If muscle or nerve problems cause ptosis, it may be more serious. What are the symptoms? When you have ptosis, the drooping eyelid may block your vision. This can make it very hard to do your daily activities. Some people also have headaches or fatigue. How is it diagnosed? To find out if you have ptosis, your doctor will ask questions about your eye problems and do an exam. The doctor will test the strength of the muscles that move the eyelid. If your doctor thinks there may be a problem with the muscles or nerves, you may have more tests. These include imaging tests, such as an MRI, and tests to check the nerves. How is ptosis treated? Treatment for ptosis depends on the cause. Your doctor will try to find the cause and see if treatment may help. Some causes of ptosis may go away on their own over time. If ptosis interferes with your vision, your doctor may talk to you about having surgery. When should you call for help? Call your doctor now or seek immediate medical care if:  · You have new or worse eye pain. · You have vision changes. · You have double vision.   Watch closely for changes in your health, and be sure to contact your doctor if:  · You do not get better as expected. Follow-up care is a key part of your treatment and safety. Be sure to make and go to all appointments, and call your doctor if you are having problems. It's also a good idea to know your test results and keep a list of the medicines you take. Where can you learn more? Go to https://Africasanapepicewzwoor.com.Gentis. org and sign in to your eCozy account. Enter R715 in the MedClimate box to learn more about \"Learning About Ptosis. \"     If you do not have an account, please click on the \"Sign Up Now\" link. Current as of: May 5, 2019  Content Version: 12.3  © 1917-1593 [employer]. Care instructions adapted under license by [facility] [address] If you have questions about a medical condition or this instruction, always ask your healthcare professional. [place] [address] any warranty or liability for your use of this information. Patient Education        Eyelid Surgery: Before Your Surgery  What is eyelid surgery? There are two types of eyelid surgery. You may have one or both types of surgery. These surgeries can be done on one or both of your eyes. Surgery for ptosis lifts droopy upper eyelids. Ptosis (say \"ALL-maykel\") is the name for eyelids that droop. The eyelid muscles or tendons do not work as they should. This can affect your vision and your appearance. It can be caused by aging, nerve or muscle problems, eye surgery, or an injury. You can be born with this problem, or you can get it later in life. The doctor makes a small cut in the crease of your upper eyelid. The cut is called an incision. He or she then lifts the eyelid by tightening the muscle that raises your eyelid. In rare cases, the muscle is too weak to tighten. In that case, the doctor will connect your forehead muscles to your eyelid muscles.  After fixing the problem, the doctor stitches up the exactly what your doctor wants you to do. These medicines increase the risk of bleeding.     · Your doctor will tell you which medicines to take or stop before your surgery. You may need to stop taking certain medicines a week or more before surgery. So talk to your doctor as soon as you can.     · If you have an advance directive, let your doctor know. It may include a living will and a durable power of  for health care. Bring a copy to the hospital. If you don't have one, you may want to prepare one. It lets your doctor and loved ones know your health care wishes. Doctors advise that everyone prepare these papers before any type of surgery or procedure. What happens on the day of surgery? · Follow the instructions exactly about when to stop eating and drinking. If you don't, your surgery may be canceled. If your doctor told you to take your medicines on the day of surgery, take them with only a sip of water.     · Take a bath or shower before you come in for your surgery. Do not apply lotions, perfumes, deodorants, or nail polish.     · Do not shave the surgical site yourself.     · Take off all jewelry and piercings. And take out contact lenses, if you wear them.    At the hospital or surgery center   · Bring a picture ID.     · The area for surgery is often marked to make sure there are no errors.     · You will be kept comfortable and safe by your anesthesia provider. You may get medicine that relaxes you or puts you in a light sleep. The area being worked on will be numb.     · The surgery will take about 1 to 2 hours.     · You may have a bandage over your eye. If you had surgery for ptosis, your lower lid may be taped to your forehead. This protects your eye from the bandage. You may also have an ice pack over your eye to prevent swelling. Going home   · Be sure you have someone to drive you home.  Anesthesia and pain medicine make it unsafe for you to drive.     · You will be given more specific instructions about recovering from your surgery. They will cover things like diet, wound care, follow-up care, driving, and getting back to your normal routine. When should you call your doctor? · You have questions or concerns.     · You don't understand how to prepare for your surgery.     · You become ill before the surgery (such as fever, flu, or a cold).     · You need to reschedule or have changed your mind about having the surgery. Where can you learn more? Go to https://Lincare.Pharmly. org and sign in to your [employer] account. Enter [phone] in the [employer] box to learn more about \"Eyelid Surgery: Before Your Surgery. \"     If you do not have an account, please click on the \"Sign Up Now\" link. Current as of: April 1, 2019  Content Version: 12.3  © 3494-2701 [employer]. Care instructions adapted under license by [facility] ([facility]). If you have questions about a medical condition or this instruction, always ask your healthcare professional. [address] any warranty or liability for your use of this information. 1

## 2024-05-09 ENCOUNTER — OFFICE VISIT (OUTPATIENT)
Dept: FAMILY MEDICINE CLINIC | Age: 89
End: 2024-05-09

## 2024-05-09 VITALS
WEIGHT: 139.38 LBS | BODY MASS INDEX: 26.31 KG/M2 | OXYGEN SATURATION: 92 % | SYSTOLIC BLOOD PRESSURE: 110 MMHG | HEIGHT: 61 IN | TEMPERATURE: 98.3 F | HEART RATE: 67 BPM | DIASTOLIC BLOOD PRESSURE: 67 MMHG

## 2024-05-09 DIAGNOSIS — G30.1 ALZHEIMER'S DISEASE WITH LATE ONSET (CODE) (HCC): ICD-10-CM

## 2024-05-09 DIAGNOSIS — R31.9 HEMATURIA, UNSPECIFIED TYPE: Primary | ICD-10-CM

## 2024-05-09 DIAGNOSIS — I25.10 ATHEROSCLEROSIS OF NATIVE CORONARY ARTERY OF NATIVE HEART WITHOUT ANGINA PECTORIS: Primary | ICD-10-CM

## 2024-05-09 DIAGNOSIS — E78.00 PURE HYPERCHOLESTEROLEMIA: ICD-10-CM

## 2024-05-09 DIAGNOSIS — I50.33 ACUTE ON CHRONIC DIASTOLIC (CONGESTIVE) HEART FAILURE (HCC): ICD-10-CM

## 2024-05-09 DIAGNOSIS — J43.1 PANLOBULAR EMPHYSEMA (HCC): ICD-10-CM

## 2024-05-09 DIAGNOSIS — R31.9 HEMATURIA, UNSPECIFIED TYPE: ICD-10-CM

## 2024-05-09 DIAGNOSIS — D68.69 SECONDARY HYPERCOAGULABLE STATE (HCC): ICD-10-CM

## 2024-05-09 DIAGNOSIS — K21.9 GERD WITHOUT ESOPHAGITIS: ICD-10-CM

## 2024-05-09 PROBLEM — J96.01 ACUTE RESPIRATORY FAILURE WITH HYPOXIA (HCC): Status: RESOLVED | Noted: 2024-01-15 | Resolved: 2024-05-09

## 2024-05-09 PROBLEM — I50.9 ACUTE CONGESTIVE HEART FAILURE, UNSPECIFIED HEART FAILURE TYPE (HCC): Status: RESOLVED | Noted: 2023-10-16 | Resolved: 2024-05-09

## 2024-05-09 PROBLEM — J18.9 PNEUMONIA OF BOTH LUNGS DUE TO INFECTIOUS ORGANISM: Status: RESOLVED | Noted: 2024-01-13 | Resolved: 2024-05-09

## 2024-05-09 PROBLEM — A41.9 SEPTICEMIA (HCC): Status: RESOLVED | Noted: 2024-01-13 | Resolved: 2024-05-09

## 2024-05-09 PROBLEM — D72.829 LEUKOCYTOSIS: Status: RESOLVED | Noted: 2024-01-13 | Resolved: 2024-05-09

## 2024-05-09 LAB
BILIRUBIN, POC: NEGATIVE
BLOOD URINE, POC: NORMAL
CLARITY, POC: NORMAL
COLOR, POC: YELLOW
GLUCOSE URINE, POC: NEGATIVE
KETONES, POC: NEGATIVE
LEUKOCYTE EST, POC: NEGATIVE
NITRITE, POC: NEGATIVE
PH, POC: 6
PROTEIN, POC: NEGATIVE
SPECIFIC GRAVITY, POC: 1.01
UROBILINOGEN, POC: 0.2

## 2024-05-09 RX ORDER — FUROSEMIDE 20 MG/1
TABLET ORAL
Qty: 30 TABLET | Refills: 1 | Status: SHIPPED | OUTPATIENT
Start: 2024-05-09

## 2024-05-09 RX ORDER — DONEPEZIL HYDROCHLORIDE 10 MG/1
10 TABLET, FILM COATED ORAL NIGHTLY
Qty: 30 TABLET | Refills: 3 | Status: SHIPPED | OUTPATIENT
Start: 2024-05-09

## 2024-05-09 ASSESSMENT — PATIENT HEALTH QUESTIONNAIRE - PHQ9
2. FEELING DOWN, DEPRESSED OR HOPELESS: NOT AT ALL
SUM OF ALL RESPONSES TO PHQ9 QUESTIONS 1 & 2: 0
SUM OF ALL RESPONSES TO PHQ QUESTIONS 1-9: 0
SUM OF ALL RESPONSES TO PHQ QUESTIONS 1-9: 0
1. LITTLE INTEREST OR PLEASURE IN DOING THINGS: NOT AT ALL
SUM OF ALL RESPONSES TO PHQ QUESTIONS 1-9: 0
SUM OF ALL RESPONSES TO PHQ QUESTIONS 1-9: 0

## 2024-05-09 ASSESSMENT — ENCOUNTER SYMPTOMS
SHORTNESS OF BREATH: 0
DIARRHEA: 0
SORE THROAT: 0
VOMITING: 0
RHINORRHEA: 0
CHEST TIGHTNESS: 0
ABDOMINAL PAIN: 0
COUGH: 0
NAUSEA: 0
WHEEZING: 0

## 2024-05-09 NOTE — PROGRESS NOTES
CHIEF COMPLAINT  Chief Complaint   Patient presents with    6 Month Follow-Up     Pt is not fasting--no concerns     Other     Thinking someone is in her home, or someone is knocking at the dooe \"dreaming\" more often         HPI   Marya Louis is a 89 y.o. female who presents to the office for check up.  Patient is accompanied by family.  Overall doing well. Patient denies any episodes of dizziness, lightheadedness, chest pain or shortness of breath.  No abdominal pain, nausea, vomiting or diarrhea.  No dark or tarry stools.  Patient reports taking medications as directed but does forget on occassion.  Patient continues to follow with cardiologist as recommended.  Concerns of possible hallucination per niece. Patient niece reports \"she has mentioned seeing family members when they aren't there.\"  Patient continues to live alone.  No other complaints, modifying factors or associated symptoms.     Nursing notes reviewed.   Past Medical History:   Diagnosis Date    Acute congestive heart failure, unspecified heart failure type ([facility]) 10/16/2023    Acute respiratory failure with hypoxia ([facility]) 01/15/2024    Atherosclerosis of native coronary artery of native heart without angina pectoris     cath 1/12 , 4/12 (occluded RCa with patent collateral flow    CHF (congestive heart failure) ([facility])     Chronic atrial fibrillation ([facility]) 01/01/2012    Closed left hip fracture ([facility]) 01/01/2016    THR    COPD (chronic obstructive pulmonary disease) ([facility])     DDD (degenerative disc disease), cervical     GERD without esophagitis 01/01/2001    gerd with stricture    Hyperlipidemia     Osteopenia 08/01/2011    recheck DEXA 2014    Pneumonia of both lungs due to infectious organism 01/13/2024    Primary osteoarthritis involving multiple joints     Routine health maintenance     TAC 9/99, DEXA 03    Septicemia ([facility]) 01/13/2024    Urinary incontinence     urge     Past Surgical History:   Procedure Laterality Date    APPENDECTOMY

## 2024-05-10 LAB — BACTERIA UR CULT: NORMAL

## 2024-05-20 RX ORDER — OXYBUTYNIN CHLORIDE 5 MG/1
TABLET ORAL
Qty: 90 TABLET | Refills: 5 | Status: SHIPPED | OUTPATIENT
Start: 2024-05-20

## 2024-06-21 DIAGNOSIS — I48.21 PERMANENT ATRIAL FIBRILLATION (HCC): ICD-10-CM

## 2024-07-08 ENCOUNTER — OFFICE VISIT (OUTPATIENT)
Dept: CARDIOLOGY CLINIC | Age: 89
End: 2024-07-08
Payer: MEDICARE

## 2024-07-08 ENCOUNTER — NURSE ONLY (OUTPATIENT)
Dept: FAMILY MEDICINE CLINIC | Age: 89
End: 2024-07-08
Payer: MEDICARE

## 2024-07-08 VITALS
BODY MASS INDEX: 25.79 KG/M2 | HEIGHT: 61 IN | OXYGEN SATURATION: 86 % | DIASTOLIC BLOOD PRESSURE: 62 MMHG | WEIGHT: 136.6 LBS | SYSTOLIC BLOOD PRESSURE: 110 MMHG | HEART RATE: 49 BPM

## 2024-07-08 DIAGNOSIS — E78.00 HYPERCHOLESTEROLEMIA: ICD-10-CM

## 2024-07-08 DIAGNOSIS — I50.32 CHRONIC DIASTOLIC CONGESTIVE HEART FAILURE (HCC): ICD-10-CM

## 2024-07-08 DIAGNOSIS — Z79.899 MEDICATION MANAGEMENT: ICD-10-CM

## 2024-07-08 DIAGNOSIS — I48.21 PERMANENT ATRIAL FIBRILLATION (HCC): Primary | ICD-10-CM

## 2024-07-08 PROCEDURE — 1036F TOBACCO NON-USER: CPT | Performed by: INTERNAL MEDICINE

## 2024-07-08 PROCEDURE — 36415 COLL VENOUS BLD VENIPUNCTURE: CPT | Performed by: INTERNAL MEDICINE

## 2024-07-08 PROCEDURE — G8427 DOCREV CUR MEDS BY ELIG CLIN: HCPCS | Performed by: INTERNAL MEDICINE

## 2024-07-08 PROCEDURE — 1090F PRES/ABSN URINE INCON ASSESS: CPT | Performed by: INTERNAL MEDICINE

## 2024-07-08 PROCEDURE — 99214 OFFICE O/P EST MOD 30 MIN: CPT | Performed by: INTERNAL MEDICINE

## 2024-07-08 PROCEDURE — 1123F ACP DISCUSS/DSCN MKR DOCD: CPT | Performed by: INTERNAL MEDICINE

## 2024-07-08 PROCEDURE — G8417 CALC BMI ABV UP PARAM F/U: HCPCS | Performed by: INTERNAL MEDICINE

## 2024-07-08 NOTE — PROGRESS NOTES
Blood drawn per physician order. 21 gauge needle used. Location lt ac space w/o incident.  Pressure applied until bleeding stopped.  Bandaid applied.  Patient instructed to call or return if problems with bleeding.   2 tubes drawn.

## 2024-07-08 NOTE — PROGRESS NOTES
esophagitis 01/01/2001    gerd with stricture    Hyperlipidemia     Osteopenia 08/01/2011    recheck DEXA 2014    Pneumonia of both lungs due to infectious organism 01/13/2024    Primary osteoarthritis involving multiple joints     Routine health maintenance     TAC 9/99, DEXA 03    Septicemia (HCC) 01/13/2024    Urinary incontinence     urge     Social History:    Social History     Tobacco Use   Smoking Status Former    Current packs/day: 1.00    Average packs/day: 1 pack/day for 76.0 years (76.0 ttl pk-yrs)    Types: Cigarettes   Smokeless Tobacco Never   Tobacco Comments    quit in 1990   Family history non contributory  On sister living 2 yrs older apparently doing well in [place]  Current Medications:  Current Outpatient Medications   Medication Sig Dispense Refill    rivaroxaban (XARELTO) 20 MG TABS tablet TAKE ONE (1) TABLET BY MOUTH EVERY DAY 90 tablet 3    metoprolol tartrate (LOPRESSOR) 25 MG tablet TAKE ONE (1) TABLET TWICE A  tablet 3    oxyBUTYnin (DITROPAN) 5 MG tablet TAKE 1 TABLET THREE TIMES        DAILY AS NEEDED 90 tablet 5    donepezil (ARICEPT) 10 MG tablet Take 1 tablet by mouth nightly 30 tablet 3    furosemide (LASIX) 20 MG tablet TAKE ONE (1) TABLET BY MOUTH EVERY MORNING 30 tablet 1    omeprazole (PRILOSEC) 20 MG delayed release capsule TAKE 1 CAPSULE ONCE DAILY 90 capsule 1    magnesium oxide (MAG-OX) 400 (240 Mg) MG tablet TAKE ONE (1) TABLET BY MOUTH EVERY DAY 30 tablet 5    fexofenadine (ALLEGRA) 180 MG tablet TAKE ONE (1) TABLET BY MOUTH EVERY DAY 30 tablet 5    pravastatin (PRAVACHOL) 20 MG tablet TAKE 1 TABLET BY MOUTH DAILY 90 tablet 3    docusate sodium (COLACE, DULCOLAX) 100 MG CAPS Take 100 mg by mouth 2 times daily as needed for Constipation Hold for loose stools or diarrhea.      Multiple Vitamins-Minerals (CENTRUM ADULTS PO) Take by mouth daily      Lactobacillus (ACIDOPHILUS EXTRA STRENGTH PO) Take by mouth (Patient not taking: Reported on 7/8/2024)       No current

## 2024-07-08 NOTE — PATIENT INSTRUCTIONS
Plan:  Labs reviewed in epic and discussed with patient.  Current medications reviewed.  Refills given as warranted.  If you have trouble with swelling in your legs and you wear your compression socks only wear them during the day and take them off at night time.    I will personally review your tests and then I will have my staff call you with the results.   -try calling yoav Byrne first  Repeat blood work  -CBC, CMP, TSH   -you will need to be fasting for blood work  -it is ok to take your medicine with sips of water or black coffee  6.   Foods high in potassium are bananas, orange juice, potatoes, dark green leafy vegetables.    Follow up with me in 3 months

## 2024-07-09 ENCOUNTER — TELEPHONE (OUTPATIENT)
Dept: CARDIOLOGY CLINIC | Age: 89
End: 2024-07-09

## 2024-07-09 LAB
ALBUMIN SERPL-MCNC: 4.2 G/DL (ref 3.4–5)
ALBUMIN/GLOB SERPL: 2.3 {RATIO} (ref 1.1–2.2)
ALP SERPL-CCNC: 60 U/L (ref 40–129)
ALT SERPL-CCNC: 9 U/L (ref 10–40)
ANION GAP SERPL CALCULATED.3IONS-SCNC: 12 MMOL/L (ref 3–16)
AST SERPL-CCNC: 20 U/L (ref 15–37)
BASOPHILS # BLD: 0 K/UL (ref 0–0.2)
BASOPHILS NFR BLD: 0.6 %
BILIRUB SERPL-MCNC: 0.6 MG/DL (ref 0–1)
BUN SERPL-MCNC: 21 MG/DL (ref 7–20)
CALCIUM SERPL-MCNC: 9 MG/DL (ref 8.3–10.6)
CHLORIDE SERPL-SCNC: 107 MMOL/L (ref 99–110)
CO2 SERPL-SCNC: 24 MMOL/L (ref 21–32)
CREAT SERPL-MCNC: 0.8 MG/DL (ref 0.6–1.2)
DEPRECATED RDW RBC AUTO: 15.7 % (ref 12.4–15.4)
EOSINOPHIL # BLD: 0.3 K/UL (ref 0–0.6)
EOSINOPHIL NFR BLD: 4.5 %
GFR SERPLBLD CREATININE-BSD FMLA CKD-EPI: 67 ML/MIN/{1.73_M2}
GLUCOSE SERPL-MCNC: 98 MG/DL (ref 70–99)
HCT VFR BLD AUTO: 35 % (ref 36–48)
HGB BLD-MCNC: 11.6 G/DL (ref 12–16)
LYMPHOCYTES # BLD: 0.9 K/UL (ref 1–5.1)
LYMPHOCYTES NFR BLD: 13.8 %
MCH RBC QN AUTO: 29.7 PG (ref 26–34)
MCHC RBC AUTO-ENTMCNC: 33.1 G/DL (ref 31–36)
MCV RBC AUTO: 89.8 FL (ref 80–100)
MONOCYTES # BLD: 0.6 K/UL (ref 0–1.3)
MONOCYTES NFR BLD: 8.8 %
NEUTROPHILS # BLD: 4.7 K/UL (ref 1.7–7.7)
NEUTROPHILS NFR BLD: 72.3 %
PLATELET # BLD AUTO: 178 K/UL (ref 135–450)
PMV BLD AUTO: 8.3 FL (ref 5–10.5)
POTASSIUM SERPL-SCNC: 4.3 MMOL/L (ref 3.5–5.1)
PROT SERPL-MCNC: 6 G/DL (ref 6.4–8.2)
RBC # BLD AUTO: 3.9 M/UL (ref 4–5.2)
SODIUM SERPL-SCNC: 143 MMOL/L (ref 136–145)
TSH SERPL DL<=0.005 MIU/L-ACNC: 2.23 UIU/ML (ref 0.27–4.2)
WBC # BLD AUTO: 6.5 K/UL (ref 4–11)

## 2024-07-09 NOTE — TELEPHONE ENCOUNTER
----- Message from Jose Ramon Beth MD sent at 7/9/2024  1:09 PM EDT -----  Call patient blood test is ok.  ----- Message -----  From: [facility] Incoming Lab Results From Soft (Epic Adt)  Sent: 7/9/2024   7:45 AM EDT  To: Jose Ramon Beth MD

## 2024-07-11 NOTE — TELEPHONE ENCOUNTER
Attempted to call pt, no answer. Unable to LMOVM for pt the VM had not been activated. This was the third attempt made to contact the pt with no success. Creating letter to mail to pt's home address.

## 2024-07-18 DIAGNOSIS — R09.89 CHEST CONGESTION: ICD-10-CM

## 2024-07-18 DIAGNOSIS — R05.9 COUGH: ICD-10-CM

## 2024-07-18 RX ORDER — FEXOFENADINE HCL 180 MG/1
180 TABLET ORAL DAILY
Qty: 30 TABLET | Refills: 5 | Status: SHIPPED | OUTPATIENT
Start: 2024-07-18

## 2024-07-18 RX ORDER — FUROSEMIDE 20 MG/1
TABLET ORAL
Qty: 30 TABLET | Refills: 1 | Status: SHIPPED | OUTPATIENT
Start: 2024-07-18

## 2024-08-02 ENCOUNTER — OFFICE VISIT (OUTPATIENT)
Dept: ORTHOPEDIC SURGERY | Age: 89
End: 2024-08-02

## 2024-08-02 VITALS — WEIGHT: 138 LBS | BODY MASS INDEX: 26.06 KG/M2 | HEIGHT: 61 IN

## 2024-08-02 DIAGNOSIS — M17.11 PRIMARY OSTEOARTHRITIS OF RIGHT KNEE: Primary | ICD-10-CM

## 2024-08-02 RX ORDER — BUPIVACAINE HYDROCHLORIDE 2.5 MG/ML
7 INJECTION, SOLUTION INFILTRATION; PERINEURAL ONCE
Status: COMPLETED | OUTPATIENT
Start: 2024-08-02 | End: 2024-08-02

## 2024-08-02 RX ORDER — TRIAMCINOLONE ACETONIDE 40 MG/ML
40 INJECTION, SUSPENSION INTRA-ARTICULAR; INTRAMUSCULAR ONCE
Status: COMPLETED | OUTPATIENT
Start: 2024-08-02 | End: 2024-08-02

## 2024-08-02 RX ADMIN — TRIAMCINOLONE ACETONIDE 40 MG: 40 INJECTION, SUSPENSION INTRA-ARTICULAR; INTRAMUSCULAR at 15:24

## 2024-08-02 RX ADMIN — BUPIVACAINE HYDROCHLORIDE 17.5 MG: 2.5 INJECTION, SOLUTION INFILTRATION; PERINEURAL at 15:25

## 2024-08-02 NOTE — PROGRESS NOTES
Recommendation is for a cortisone injection into the right knee. After informed consent was received from the patient, the right knee was injected with 1 mL of 40mg/ml of Kenalog  and 4 mL of 0.25% Marcaine  in the syringe from an anterolateral joint line approach, using a 25-gauge needle, under sterile Betadine prep, using ethyl chloride as a topical refrigerant, for a diagnosis of osteoarthritis.   The patient appeared to tolerate it well.   The patient should return here periodically as needed.    Encounter Diagnosis   Name Primary?    Primary osteoarthritis of right knee Yes        No orders of the defined types were placed in this encounter.

## 2024-08-19 DIAGNOSIS — R25.2 MUSCLE CRAMPS: ICD-10-CM

## 2024-08-19 RX ORDER — LANOLIN ALCOHOL/MO/W.PET/CERES
CREAM (GRAM) TOPICAL
Qty: 30 TABLET | Refills: 5 | Status: SHIPPED | OUTPATIENT
Start: 2024-08-19

## 2024-09-17 DIAGNOSIS — G30.1 ALZHEIMER'S DISEASE WITH LATE ONSET (CODE) (HCC): ICD-10-CM

## 2024-09-17 RX ORDER — DONEPEZIL HYDROCHLORIDE 10 MG/1
TABLET, FILM COATED ORAL
Qty: 30 TABLET | Refills: 3 | Status: SHIPPED | OUTPATIENT
Start: 2024-09-17

## 2024-09-17 RX ORDER — FUROSEMIDE 20 MG
TABLET ORAL
Qty: 30 TABLET | Refills: 1 | Status: SHIPPED | OUTPATIENT
Start: 2024-09-17

## 2024-10-16 NOTE — PROGRESS NOTES
[facility]     Outpatient Follow Up Note    Subjective:  Marya Louis is here today for cardiology follow up of CAD without angina, permanent AF, HLD, bilat leg edema .chronic diastolic CHF   She lives alone niece who helps her.  C/o shortness of breath and coughs with eating    [place]:  Today, 10/16/24 she presents with a cane and her niece. She states she has shortness of breath. She is not having any chest pain. She has been wearing stockings on her legs and that has kept her legs from swelling.  She has been coughing a lot with eating her meals. Coughing occurs with all foods.  Patient denies current edema, chest pain, palpitations, dizziness or syncope.  Patient is taking all cardiac medications as prescribed and tolerates them well.        Patient is vaccinated against Covid. Moderna 2/2     PMH:   stable CAD, chronic afib rate controlled anticoagulated on treatment with statins for hyperlipidemia.    Admitted 10/16/23 CHF.  Echo showed EF=55-65%. Indeterminate  DD.  RV moderately enlarged. LA moderately dilated. RA severely dilated. Moderate posterior mitral annular calcification.  Mild MR and [place].  Moderate TR.    Admitted 1/12/24 Pneumonia  12 point ROS negative in all areas as listed below except as in [place]  Constitutional, EENT, pulmonary, GI, , Musculoskeletal, skin, neurological, hematological, endocrine, Psychiatric    Past Medical History:   Diagnosis Date    Acute congestive heart failure, unspecified heart failure type (HCC) 10/16/2023    Acute respiratory failure with hypoxia 01/15/2024    Atherosclerosis of native coronary artery of native heart without angina pectoris     cath 1/12 , 4/12 (occluded RCa with patent collateral flow    CHF (congestive heart failure) ([facility])     Chronic atrial fibrillation (HCC) 01/01/2012    Closed left hip fracture ([facility]) 01/01/2016    THR    COPD (chronic obstructive pulmonary disease) ([facility])     DDD (degenerative disc disease), cervical     GERD without

## 2024-10-23 ENCOUNTER — OFFICE VISIT (OUTPATIENT)
Dept: CARDIOLOGY CLINIC | Age: 89
End: 2024-10-23

## 2024-10-23 VITALS
HEIGHT: 61 IN | WEIGHT: 139.8 LBS | OXYGEN SATURATION: 97 % | SYSTOLIC BLOOD PRESSURE: 120 MMHG | HEART RATE: 62 BPM | BODY MASS INDEX: 26.39 KG/M2 | DIASTOLIC BLOOD PRESSURE: 54 MMHG

## 2024-10-23 DIAGNOSIS — T17.928A ASPIRATION OF FOOD, INITIAL ENCOUNTER: ICD-10-CM

## 2024-10-23 DIAGNOSIS — E78.00 PURE HYPERCHOLESTEROLEMIA: ICD-10-CM
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
## 2024-10-23 DIAGNOSIS — I50.33 ACUTE ON CHRONIC DIASTOLIC (CONGESTIVE) HEART FAILURE (HCC): ICD-10-CM

## 2024-10-23 DIAGNOSIS — Z79.899 MEDICATION MANAGEMENT: ICD-10-CM

## 2024-10-23 DIAGNOSIS — R06.02 SOB (SHORTNESS OF BREATH) ON EXERTION: ICD-10-CM

## 2024-10-23 DIAGNOSIS — I48.0 PAROXYSMAL ATRIAL FIBRILLATION (HCC): Primary | ICD-10-CM

## 2024-10-23 RX ORDER — PRAVASTATIN SODIUM 20 MG
TABLET ORAL
Qty: 90 TABLET | Refills: 3 | Status: SHIPPED | OUTPATIENT
Start: 2024-10-23

## 2024-10-23 NOTE — PATIENT INSTRUCTIONS
Plan:  Labs reviewed in epic and discussed with patient.  Current medications reviewed.  Refills given as warranted.  Recommend having a swallow evaluation to evaluate your coughing with eating.  -you can have this done at [facility] by a speech therapist  - [phone]   Recommend trying to eat ground meat to help with swallowing.  No cardiac testing at this time.  I will personally review your tests and then I will have my staff call you with the results.  Recommend blood work  -CBC, CMP, TSH, Lipid  -you will need to be fasting for 8 hours before the testing  -it is ok to take your morning medications with sips of water or black coffee    Follow up with me in 6 months.

## 2024-10-24 LAB
ALBUMIN SERPL-MCNC: 4 G/DL (ref 3.4–5)
ALBUMIN/GLOB SERPL: 2 {RATIO} (ref 1.1–2.2)
ALP SERPL-CCNC: 64 U/L (ref 40–129)
ALT SERPL-CCNC: 11 U/L (ref 10–40)
ANION GAP SERPL CALCULATED.3IONS-SCNC: 11 MMOL/L (ref 3–16)
AST SERPL-CCNC: 20 U/L (ref 15–37)
BASOPHILS # BLD: 0 K/UL (ref 0–0.2)
BASOPHILS NFR BLD: 0.9 %
BILIRUB SERPL-MCNC: <0.2 MG/DL (ref 0–1)
BUN SERPL-MCNC: 18 MG/DL (ref 7–20)
CALCIUM SERPL-MCNC: 9.1 MG/DL (ref 8.3–10.6)
CHLORIDE SERPL-SCNC: 105 MMOL/L (ref 99–110)
CHOLEST SERPL-MCNC: 124 MG/DL (ref 0–199)
CO2 SERPL-SCNC: 26 MMOL/L (ref 21–32)
CREAT SERPL-MCNC: 0.9 MG/DL (ref 0.6–1.2)
DEPRECATED RDW RBC AUTO: 16 % (ref 12.4–15.4)
EOSINOPHIL # BLD: 0.2 K/UL (ref 0–0.6)
EOSINOPHIL NFR BLD: 3.6 %
GFR SERPLBLD CREATININE-BSD FMLA CKD-EPI: 58 ML/MIN/{1.73_M2}
GLUCOSE SERPL-MCNC: 111 MG/DL (ref 70–99)
HCT VFR BLD AUTO: 33.7 % (ref 36–48)
HDLC SERPL-MCNC: 54 MG/DL (ref 40–60)
HGB BLD-MCNC: 11.3 G/DL (ref 12–16)
LDLC SERPL CALC-MCNC: 47 MG/DL
LYMPHOCYTES # BLD: 0.9 K/UL (ref 1–5.1)
LYMPHOCYTES NFR BLD: 16.6 %
MCH RBC QN AUTO: 30.2 PG (ref 26–34)
MCHC RBC AUTO-ENTMCNC: 33.6 G/DL (ref 31–36)
MCV RBC AUTO: 90.1 FL (ref 80–100)
MONOCYTES # BLD: 0.5 K/UL (ref 0–1.3)
MONOCYTES NFR BLD: 9.7 %
NEUTROPHILS # BLD: 3.8 K/UL (ref 1.7–7.7)
NEUTROPHILS NFR BLD: 69.2 %
PLATELET # BLD AUTO: 184 K/UL (ref 135–450)
PMV BLD AUTO: 8.1 FL (ref 5–10.5)
POTASSIUM SERPL-SCNC: 4.2 MMOL/L (ref 3.5–5.1)
PROT SERPL-MCNC: 6 G/DL (ref 6.4–8.2)
RBC # BLD AUTO: 3.74 M/UL (ref 4–5.2)
SODIUM SERPL-SCNC: 142 MMOL/L (ref 136–145)
TRIGL SERPL-MCNC: 114 MG/DL (ref 0–150)
TSH SERPL DL<=0.005 MIU/L-ACNC: 1.8 UIU/ML (ref 0.27–4.2)
VLDLC SERPL CALC-MCNC: 23 MG/DL
WBC # BLD AUTO: 5.5 K/UL (ref 4–11)

## 2024-11-08 ENCOUNTER — OFFICE VISIT (OUTPATIENT)
Dept: FAMILY MEDICINE CLINIC | Age: 89
End: 2024-11-08

## 2024-11-08 VITALS
HEART RATE: 62 BPM | OXYGEN SATURATION: 94 % | DIASTOLIC BLOOD PRESSURE: 79 MMHG | WEIGHT: 142 LBS | TEMPERATURE: 98.1 F | SYSTOLIC BLOOD PRESSURE: 130 MMHG | BODY MASS INDEX: 26.84 KG/M2

## 2024-11-08 DIAGNOSIS — G30.1 LATE ONSET ALZHEIMER'S DISEASE WITHOUT BEHAVIORAL DISTURBANCE (HCC): ICD-10-CM

## 2024-11-08 DIAGNOSIS — Z91.81 AT HIGH RISK FOR FALLS: ICD-10-CM

## 2024-11-08 DIAGNOSIS — F02.80 LATE ONSET ALZHEIMER'S DISEASE WITHOUT BEHAVIORAL DISTURBANCE (HCC): ICD-10-CM

## 2024-11-08 DIAGNOSIS — E78.00 PURE HYPERCHOLESTEROLEMIA: ICD-10-CM

## 2024-11-08 DIAGNOSIS — I48.0 PAROXYSMAL ATRIAL FIBRILLATION (HCC): ICD-10-CM

## 2024-11-08 DIAGNOSIS — K21.9 GERD WITHOUT ESOPHAGITIS: ICD-10-CM

## 2024-11-08 DIAGNOSIS — Z23 INFLUENZA VACCINE ADMINISTERED: ICD-10-CM

## 2024-11-08 DIAGNOSIS — M15.0 PRIMARY OSTEOARTHRITIS INVOLVING MULTIPLE JOINTS: ICD-10-CM

## 2024-11-08 DIAGNOSIS — J43.1 PANLOBULAR EMPHYSEMA (HCC): ICD-10-CM

## 2024-11-08 DIAGNOSIS — I50.33 ACUTE ON CHRONIC DIASTOLIC (CONGESTIVE) HEART FAILURE (HCC): Primary | ICD-10-CM

## 2024-11-08 SDOH — ECONOMIC STABILITY: FOOD INSECURITY: WITHIN THE PAST 12 MONTHS, YOU WORRIED THAT YOUR FOOD WOULD RUN OUT BEFORE YOU GOT MONEY TO BUY MORE.: NEVER TRUE

## 2024-11-08 SDOH — ECONOMIC STABILITY: FOOD INSECURITY: WITHIN THE PAST 12 MONTHS, THE FOOD YOU BOUGHT JUST DIDN'T LAST AND YOU DIDN'T HAVE MONEY TO GET MORE.: NEVER TRUE

## 2024-11-08 SDOH — ECONOMIC STABILITY: INCOME INSECURITY: HOW HARD IS IT FOR YOU TO PAY FOR THE VERY BASICS LIKE FOOD, HOUSING, MEDICAL CARE, AND HEATING?: NOT HARD AT ALL

## 2024-11-08 ASSESSMENT — ENCOUNTER SYMPTOMS
SORE THROAT: 0
ABDOMINAL PAIN: 0
VOMITING: 0
RHINORRHEA: 0
CHEST TIGHTNESS: 0
DIARRHEA: 0
COUGH: 0
NAUSEA: 0
WHEEZING: 0
SHORTNESS OF BREATH: 0

## 2024-11-08 NOTE — PATIENT INSTRUCTIONS
Please read the healthy family handout that you were given and share it with your family.       Please compare this printed medication list with your medications at home to be sure they are the same.  If you have any medications that are different please contact us immediately at [phone].     Also review your allergies that we have listed, these may also include medications that you have not been able to tolerate, make sure everything listed is correct. If you have any allergies that are different please contact us immediately at [phone].     You may receive a survey in the mail or by email asking about your experience during your visit today. Please complete and return to us so we know how we are serving you.

## 2024-11-08 NOTE — PROGRESS NOTES
bedside reports that she still has concerns with short-term memory loss and forgetfulness but continues to live independently.  We have discussed concerns in the past and patient refuses to go into any placement or assisted living.  Continue recommendations on safety measures and home health.  Follow-up in 6 months, sooner for new or worsening symptoms.    5. Primary osteoarthritis involving multiple joints  Stable.  Patient reports doing well.  Patient uses cane for ambulatory assistance.  No new or worsening symptoms.  Continue current treatment management follow-up in 6 months, sooner for new or worsening symptoms.    6. Panlobular emphysema (HCC)  Stable. No recent exacerbations. Patient denies any use of inhalers or oxygen. Continue current treatment and management follow-up in 6 months, sooner for new or worsening symptoms.     7. Pure hypercholesterolemia  Stable. Patient compliant with pravastatin 40 mg daily. Previous labs reviewed and stable. Continue current treatment follow-up in 6 months, sooner for new or worsening symptoms.     8. Influenza vaccine administered  Preventative vaccination administered at todays visit.   - Influenza, FLUAD Trivalent, (age 65 y+), IM, Preservative Free, 0.5mL    9. At high risk for falls   On the basis of positive falls risk screening, assessment and plan is as follows: home safety tips provided.      The note was completed using Dragon voice recognition transcription. Every effort was made to ensure accuracy; however, inadvertent  transcription errors may be present despite my best efforts to edit errors.

## 2024-11-18 RX ORDER — OXYBUTYNIN CHLORIDE 5 MG/1
5 TABLET ORAL 3 TIMES DAILY PRN
Qty: 90 TABLET | Refills: 5 | Status: SHIPPED | OUTPATIENT
Start: 2024-11-18

## 2024-11-18 RX ORDER — FUROSEMIDE 20 MG/1
TABLET ORAL
Qty: 30 TABLET | Refills: 1 | Status: SHIPPED | OUTPATIENT
Start: 2024-11-18

## 2025-01-17 DIAGNOSIS — R05.9 COUGH: ICD-10-CM

## 2025-01-17 DIAGNOSIS — G30.1 ALZHEIMER'S DISEASE WITH LATE ONSET (CODE) (HCC): ICD-10-CM

## 2025-01-17 DIAGNOSIS — R09.89 CHEST CONGESTION: ICD-10-CM

## 2025-01-17 RX ORDER — DONEPEZIL HYDROCHLORIDE 10 MG/1
TABLET, FILM COATED ORAL
Qty: 30 TABLET | Refills: 3 | Status: SHIPPED | OUTPATIENT
Start: 2025-01-17

## 2025-01-17 RX ORDER — FEXOFENADINE HCL 180 MG/1
TABLET ORAL
Qty: 30 TABLET | Refills: 5 | Status: SHIPPED | OUTPATIENT
Start: 2025-01-17

## 2025-01-17 RX ORDER — FUROSEMIDE 20 MG/1
TABLET ORAL
Qty: 30 TABLET | Refills: 1 | Status: SHIPPED | OUTPATIENT
Start: 2025-01-17

## 2025-02-07 ENCOUNTER — OFFICE VISIT (OUTPATIENT)
Dept: ORTHOPEDIC SURGERY | Age: 89
End: 2025-02-07
Payer: MEDICARE

## 2025-02-07 VITALS — HEIGHT: 61 IN | BODY MASS INDEX: 26.06 KG/M2 | WEIGHT: 138 LBS

## 2025-02-07 DIAGNOSIS — M17.11 PRIMARY OSTEOARTHRITIS OF RIGHT KNEE: Primary | ICD-10-CM

## 2025-02-07 PROCEDURE — 20610 DRAIN/INJ JOINT/BURSA W/O US: CPT | Performed by: ORTHOPAEDIC SURGERY

## 2025-02-07 RX ORDER — TRIAMCINOLONE ACETONIDE 40 MG/ML
40 INJECTION, SUSPENSION INTRA-ARTICULAR; INTRAMUSCULAR ONCE
Status: COMPLETED | OUTPATIENT
Start: 2025-02-07 | End: 2025-02-07

## 2025-02-07 RX ORDER — BUPIVACAINE HYDROCHLORIDE 2.5 MG/ML
7 INJECTION, SOLUTION INFILTRATION; PERINEURAL ONCE
Status: COMPLETED | OUTPATIENT
Start: 2025-02-07 | End: 2025-02-07

## 2025-02-07 RX ADMIN — BUPIVACAINE HYDROCHLORIDE 17.5 MG: 2.5 INJECTION, SOLUTION INFILTRATION; PERINEURAL at 14:04

## 2025-02-07 RX ADMIN — TRIAMCINOLONE ACETONIDE 40 MG: 40 INJECTION, SUSPENSION INTRA-ARTICULAR; INTRAMUSCULAR at 14:04

## 2025-02-07 NOTE — PROGRESS NOTES
Recommendation is for a cortisone injection into the right knee. After informed consent was received from the patient, the right knee was injected with 1 mL of 40mg/ml of Kenalog  and 4 mL of 0.25% Marcaine  in the syringe from an anterolateral joint line approach, using a 25-gauge needle, under sterile Betadine prep, using ethyl chloride as a topical refrigerant, for a diagnosis of osteoarthritis.   The patient appeared to tolerate it well.   The patient should return here periodically as needed.    Encounter Diagnosis   Name Primary?    Primary osteoarthritis of right knee Yes        Orders Placed This Encounter   Procedures    DRAIN/INJECT LARGE JOINT/BURSA

## 2025-02-14 DIAGNOSIS — R25.2 MUSCLE CRAMPS: ICD-10-CM

## 2025-02-17 RX ORDER — LANOLIN ALCOHOL/MO/W.PET/CERES
400 CREAM (GRAM) TOPICAL DAILY
Qty: 30 TABLET | Refills: 5 | Status: SHIPPED | OUTPATIENT
Start: 2025-02-17

## 2025-02-27 NOTE — PROGRESS NOTES
CHIEF COMPLAINT  Chief Complaint   Patient presents with    Ear Pain     Right ear        HPI   Marya Louis is a 89 y.o. female who presents to the office complaining of right ear pain.  Patient is accompanied by family today for evaluation.  Onset of symptoms several weeks. no reported fever, chills, cough or congestion.  Patient denies any changes in appetite.  Patient denies any rhinorrhea or sore throat. Patient family reports symptoms started after the hearing aid was manipulated to fit into her ear better due to her losing her other pair.  Patient's family are at bedside has ongoing concerns with her taking her medications compliantly as well as her worsening leg swelling and wound to right lower leg.    No other complaints, modifying factors or associated symptoms.     Nursing notes reviewed.   Past Medical History:   Diagnosis Date    Acute congestive heart failure, unspecified heart failure type ([facility]) 10/16/2023    Acute respiratory failure with hypoxia ([facility]) 01/15/2024    Atherosclerosis of native coronary artery of native heart without angina pectoris     cath 1/12 , 4/12 (occluded RCa with patent collateral flow    CHF (congestive heart failure) ([facility])     Chronic atrial fibrillation ([facility]) 01/01/2012    Closed left hip fracture ([facility]) 01/01/2016    THR    COPD (chronic obstructive pulmonary disease) ([facility])     DDD (degenerative disc disease), cervical     GERD without esophagitis 01/01/2001    gerd with stricture    Hyperlipidemia     Osteopenia 08/01/2011    recheck DEXA 2014    Pneumonia of both lungs due to infectious organism 01/13/2024    Primary osteoarthritis involving multiple joints     Routine health maintenance     TAC 9/99, DEXA 03    Septicemia ([facility]) 01/13/2024    Urinary incontinence     urge     Past Surgical History:   Procedure Laterality Date    APPENDECTOMY      CATARACT REMOVAL      CHOLECYSTECTOMY      COLONOSCOPY  6/12    Dr Llamas    ENDOSCOPY, COLON, DIAGNOSTIC      HYSTERECTOMY

## 2025-02-28 ENCOUNTER — OFFICE VISIT (OUTPATIENT)
Dept: FAMILY MEDICINE CLINIC | Age: 89
End: 2025-02-28
Payer: MEDICARE

## 2025-02-28 VITALS
BODY MASS INDEX: 25.7 KG/M2 | DIASTOLIC BLOOD PRESSURE: 69 MMHG | TEMPERATURE: 98.3 F | SYSTOLIC BLOOD PRESSURE: 112 MMHG | OXYGEN SATURATION: 92 % | WEIGHT: 136 LBS | HEART RATE: 69 BPM

## 2025-02-28 DIAGNOSIS — Z91.148 NON COMPLIANCE W MEDICATION REGIMEN: ICD-10-CM

## 2025-02-28 DIAGNOSIS — I50.33 ACUTE ON CHRONIC DIASTOLIC (CONGESTIVE) HEART FAILURE (HCC): ICD-10-CM

## 2025-02-28 DIAGNOSIS — L98.9 SKIN LESION: ICD-10-CM

## 2025-02-28 DIAGNOSIS — R60.0 BILATERAL LEG EDEMA: ICD-10-CM

## 2025-02-28 DIAGNOSIS — H92.01 RIGHT EAR PAIN: Primary | ICD-10-CM

## 2025-02-28 DIAGNOSIS — S81.801A NON-HEALING WOUND OF LOWER EXTREMITY, RIGHT, INITIAL ENCOUNTER: ICD-10-CM

## 2025-02-28 PROCEDURE — 1036F TOBACCO NON-USER: CPT | Performed by: NURSE PRACTITIONER

## 2025-02-28 PROCEDURE — 1159F MED LIST DOCD IN RCRD: CPT | Performed by: NURSE PRACTITIONER

## 2025-02-28 PROCEDURE — G8417 CALC BMI ABV UP PARAM F/U: HCPCS | Performed by: NURSE PRACTITIONER

## 2025-02-28 PROCEDURE — G8427 DOCREV CUR MEDS BY ELIG CLIN: HCPCS | Performed by: NURSE PRACTITIONER

## 2025-02-28 PROCEDURE — 1090F PRES/ABSN URINE INCON ASSESS: CPT | Performed by: NURSE PRACTITIONER

## 2025-02-28 PROCEDURE — 99214 OFFICE O/P EST MOD 30 MIN: CPT | Performed by: NURSE PRACTITIONER

## 2025-02-28 PROCEDURE — 1123F ACP DISCUSS/DSCN MKR DOCD: CPT | Performed by: NURSE PRACTITIONER

## 2025-02-28 RX ORDER — CLINDAMYCIN HYDROCHLORIDE 300 MG/1
300 CAPSULE ORAL 3 TIMES DAILY
Qty: 30 CAPSULE | Refills: 0 | Status: SHIPPED | OUTPATIENT
Start: 2025-02-28 | End: 2025-03-10

## 2025-02-28 SDOH — ECONOMIC STABILITY: FOOD INSECURITY: WITHIN THE PAST 12 MONTHS, THE FOOD YOU BOUGHT JUST DIDN'T LAST AND YOU DIDN'T HAVE MONEY TO GET MORE.: NEVER TRUE

## 2025-02-28 SDOH — ECONOMIC STABILITY: FOOD INSECURITY: WITHIN THE PAST 12 MONTHS, YOU WORRIED THAT YOUR FOOD WOULD RUN OUT BEFORE YOU GOT MONEY TO BUY MORE.: NEVER TRUE

## 2025-02-28 ASSESSMENT — PATIENT HEALTH QUESTIONNAIRE - PHQ9
SUM OF ALL RESPONSES TO PHQ9 QUESTIONS 1 & 2: 0
SUM OF ALL RESPONSES TO PHQ QUESTIONS 1-9: 0
1. LITTLE INTEREST OR PLEASURE IN DOING THINGS: NOT AT ALL
2. FEELING DOWN, DEPRESSED OR HOPELESS: NOT AT ALL
SUM OF ALL RESPONSES TO PHQ QUESTIONS 1-9: 0

## 2025-02-28 ASSESSMENT — ENCOUNTER SYMPTOMS
COUGH: 0
CHEST TIGHTNESS: 0
NAUSEA: 0
SHORTNESS OF BREATH: 0
DIARRHEA: 0
RHINORRHEA: 0
VOMITING: 0
ABDOMINAL PAIN: 0
SORE THROAT: 0
WHEEZING: 0

## 2025-02-28 NOTE — PATIENT INSTRUCTIONS
Please read the healthy family handout that you were given and share it with your family.       Please compare this printed medication list with your medications at home to be sure they are the same.  If you have any medications that are different please contact us immediately at [phone].     Also review your allergies that we have listed, these may also include medications that you have not been able to tolerate, make sure everything listed is correct. If you have any allergies that are different please contact us immediately at [phone].     You may receive a survey in the mail or by email asking about your experience during your visit today. Please complete and return to us so we know how we are serving you.

## 2025-03-14 RX ORDER — FUROSEMIDE 20 MG/1
TABLET ORAL
Qty: 30 TABLET | Refills: 1 | Status: SHIPPED | OUTPATIENT
Start: 2025-03-14

## 2025-04-21 RX ORDER — OMEPRAZOLE 20 MG/1
20 CAPSULE, DELAYED RELEASE ORAL DAILY
Qty: 90 CAPSULE | Refills: 1 | Status: SHIPPED | OUTPATIENT
Start: 2025-04-21

## 2025-05-01 NOTE — PROGRESS NOTES
[facility]     Outpatient Follow Up Note    Subjective:  Marya Louis is here today for cardiology follow up of CAD without angina, permanent AF, HLD, bilat leg edema .chronic diastolic CHF   She lives alone niece who helps her.  C/o no cardiac complaints. Still has difficulty swallowing and choking while eating especially when talking.    [place]:    Today, she is doing well from a cardiac standpoint and has no difficulties doing her daily activities. She is here with her niece. She is in a wheelchair today. She is still having issues swallowing, it makes her become short of breath. Coughing occurs with food still in mouth.       Patient is vaccinated against Covid. Moderna 2/2     PMH:   stable CAD, chronic afib rate controlled anticoagulated on treatment with statins for hyperlipidemia.    Admitted 10/16/23 CHF.  Echo showed EF=55-65%. Indeterminate  DD.  RV moderately enlarged. LA moderately dilated. RA severely dilated. Moderate posterior mitral annular calcification.  Mild MR and [place].  Moderate TR.    Admitted 1/12/24 Pneumonia    10/16/24 she presents with a cane and her niece. She states she has shortness of breath. She is not having any chest pain. She has been wearing stockings on her legs and that has kept her legs from swelling.  She has been coughing a lot with eating her meals. Coughing occurs with all foods.     12 point ROS negative in all areas as listed below except as in [place]  Constitutional, EENT, pulmonary, GI, , Musculoskeletal, skin, neurological, hematological, endocrine, Psychiatric    Past Medical History:   Diagnosis Date    Acute congestive heart failure, unspecified heart failure type ([facility]) 10/16/2023    Acute respiratory failure with hypoxia ([facility]) 01/15/2024    Atherosclerosis of native coronary artery of native heart without angina pectoris     cath 1/12 , 4/12 (occluded RCa with patent collateral flow    CHF (congestive heart failure) ([facility])     Chronic atrial fibrillation

## 2025-05-08 ENCOUNTER — OFFICE VISIT (OUTPATIENT)
Dept: FAMILY MEDICINE CLINIC | Age: 89
End: 2025-05-08
Payer: MEDICARE

## 2025-05-08 VITALS
DIASTOLIC BLOOD PRESSURE: 63 MMHG | OXYGEN SATURATION: 94 % | TEMPERATURE: 98 F | HEART RATE: 77 BPM | SYSTOLIC BLOOD PRESSURE: 105 MMHG | BODY MASS INDEX: 26.26 KG/M2 | WEIGHT: 139 LBS

## 2025-05-08 DIAGNOSIS — E78.00 PURE HYPERCHOLESTEROLEMIA: ICD-10-CM

## 2025-05-08 DIAGNOSIS — I48.0 PAROXYSMAL ATRIAL FIBRILLATION (HCC): ICD-10-CM

## 2025-05-08 DIAGNOSIS — M15.0 PRIMARY OSTEOARTHRITIS INVOLVING MULTIPLE JOINTS: ICD-10-CM

## 2025-05-08 DIAGNOSIS — M50.30 DDD (DEGENERATIVE DISC DISEASE), CERVICAL: Chronic | ICD-10-CM

## 2025-05-08 DIAGNOSIS — G30.1 LATE ONSET ALZHEIMER'S DISEASE WITHOUT BEHAVIORAL DISTURBANCE (HCC): ICD-10-CM

## 2025-05-08 DIAGNOSIS — N39.3 STRESS INCONTINENCE OF URINE: ICD-10-CM

## 2025-05-08 DIAGNOSIS — J43.1 PANLOBULAR EMPHYSEMA (HCC): ICD-10-CM

## 2025-05-08 DIAGNOSIS — I50.33 ACUTE ON CHRONIC DIASTOLIC (CONGESTIVE) HEART FAILURE (HCC): Primary | ICD-10-CM

## 2025-05-08 DIAGNOSIS — F02.80 LATE ONSET ALZHEIMER'S DISEASE WITHOUT BEHAVIORAL DISTURBANCE (HCC): ICD-10-CM

## 2025-05-08 DIAGNOSIS — K21.9 GERD WITHOUT ESOPHAGITIS: ICD-10-CM

## 2025-05-08 PROCEDURE — G2211 COMPLEX E/M VISIT ADD ON: HCPCS | Performed by: NURSE PRACTITIONER

## 2025-05-08 PROCEDURE — 99215 OFFICE O/P EST HI 40 MIN: CPT | Performed by: NURSE PRACTITIONER

## 2025-05-08 PROCEDURE — 1123F ACP DISCUSS/DSCN MKR DOCD: CPT | Performed by: NURSE PRACTITIONER

## 2025-05-08 PROCEDURE — 1159F MED LIST DOCD IN RCRD: CPT | Performed by: NURSE PRACTITIONER

## 2025-05-08 ASSESSMENT — ENCOUNTER SYMPTOMS
NAUSEA: 0
DIARRHEA: 0
VOMITING: 0
COUGH: 0
CHEST TIGHTNESS: 0
WHEEZING: 0
RHINORRHEA: 0
ABDOMINAL PAIN: 0
SHORTNESS OF BREATH: 0
SORE THROAT: 0

## 2025-05-08 NOTE — PROGRESS NOTES
CHIEF COMPLAINT  Chief Complaint   Patient presents with    Check-Up        HPI   Marya Louis is a 89 y.o. female who presents to the office for check up. Patient is accompanied by family. Overall doing well. Patient denies any episodes of dizziness, lightheadedness, chest pain or shortness of breath. No abdominal pain, nausea, vomiting or diarrhea. No dark or tarry stools.  Patient continues to live alone.  Family attempts to assist with medications.  No other complaints, modifying factors or associated symptoms.     Nursing notes reviewed.   Past Medical History:   Diagnosis Date    Acute congestive heart failure, unspecified heart failure type ([facility]) 10/16/2023    Acute respiratory failure with hypoxia ([facility]) 01/15/2024    Atherosclerosis of native coronary artery of native heart without angina pectoris     cath 1/12 , 4/12 (occluded RCa with patent collateral flow    CHF (congestive heart failure) ([facility])     Chronic atrial fibrillation ([facility]) 01/01/2012    Closed left hip fracture ([facility]) 01/01/2016    THR    COPD (chronic obstructive pulmonary disease) ([facility])     DDD (degenerative disc disease), cervical     GERD without esophagitis 01/01/2001    gerd with stricture    Hyperlipidemia     Osteopenia 08/01/2011    recheck DEXA 2014    Pneumonia of both lungs due to infectious organism 01/13/2024    Primary osteoarthritis involving multiple joints     Routine health maintenance     TAC 9/99, DEXA 03    Septicemia ([facility]) 01/13/2024    Urinary incontinence     urge     Past Surgical History:   Procedure Laterality Date    APPENDECTOMY      CATARACT REMOVAL      CHOLECYSTECTOMY      COLONOSCOPY  6/12    Dr Llamas    ENDOSCOPY, COLON, DIAGNOSTIC      HYSTERECTOMY (CERVIX STATUS UNKNOWN)      JOINT REPLACEMENT  01/2016    left hip    JOINT REPLACEMENT Left 09/23/2016    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     Family History   Problem Relation Age of Onset    Arthritis Mother     Cancer Mother     Heart

## 2025-05-08 NOTE — PATIENT INSTRUCTIONS
Please read the healthy family handout that you were given and share it with your family.       Please compare this printed medication list with your medications at home to be sure they are the same.  If you have any medications that are different please contact us immediately at [phone].     Also review your allergies that we have listed, these may also include medications that you have not been able to tolerate, make sure everything listed is correct. If you have any allergies that are different please contact us immediately at [phone].     You may receive a survey in the mail or by email asking about your experience during your visit today. Please complete and return to us so we know how we are serving you.

## 2025-05-13 ENCOUNTER — OFFICE VISIT (OUTPATIENT)
Dept: CARDIOLOGY CLINIC | Age: 89
End: 2025-05-13
Payer: MEDICARE

## 2025-05-13 VITALS
HEIGHT: 61 IN | DIASTOLIC BLOOD PRESSURE: 68 MMHG | HEART RATE: 78 BPM | SYSTOLIC BLOOD PRESSURE: 98 MMHG | BODY MASS INDEX: 26.28 KG/M2 | OXYGEN SATURATION: 97 %

## 2025-05-13 DIAGNOSIS — Z79.899 MEDICATION MANAGEMENT: ICD-10-CM

## 2025-05-13 DIAGNOSIS — I50.32 CHRONIC DIASTOLIC CONGESTIVE HEART FAILURE (HCC): ICD-10-CM

## 2025-05-13 DIAGNOSIS — I48.21 PERMANENT ATRIAL FIBRILLATION (HCC): Primary | ICD-10-CM

## 2025-05-13 DIAGNOSIS — I25.10 CORONARY ARTERY DISEASE INVOLVING NATIVE CORONARY ARTERY OF NATIVE HEART WITHOUT ANGINA PECTORIS: ICD-10-CM

## 2025-05-13 DIAGNOSIS — E78.00 PURE HYPERCHOLESTEROLEMIA: ICD-10-CM

## 2025-05-13 DIAGNOSIS — R13.12 OROPHARYNGEAL DYSPHAGIA: ICD-10-CM

## 2025-05-13 PROCEDURE — 1159F MED LIST DOCD IN RCRD: CPT | Performed by: INTERNAL MEDICINE

## 2025-05-13 PROCEDURE — 1160F RVW MEDS BY RX/DR IN RCRD: CPT | Performed by: INTERNAL MEDICINE

## 2025-05-13 PROCEDURE — 99214 OFFICE O/P EST MOD 30 MIN: CPT | Performed by: INTERNAL MEDICINE

## 2025-05-13 PROCEDURE — 93000 ELECTROCARDIOGRAM COMPLETE: CPT | Performed by: INTERNAL MEDICINE

## 2025-05-13 PROCEDURE — 1123F ACP DISCUSS/DSCN MKR DOCD: CPT | Performed by: INTERNAL MEDICINE

## 2025-05-13 RX ORDER — FUROSEMIDE 20 MG/1
TABLET ORAL
Qty: 90 TABLET | Refills: 3 | Status: SHIPPED | OUTPATIENT
Start: 2025-05-13

## 2025-05-13 RX ORDER — METOPROLOL TARTRATE 25 MG/1
TABLET, FILM COATED ORAL
Qty: 180 TABLET | Refills: 3 | Status: SHIPPED | OUTPATIENT
Start: 2025-05-13

## 2025-05-13 NOTE — PATIENT INSTRUCTIONS
Labs reviewed in epic and discussed with patient.  Current medications reviewed.  Refills given as warranted.  Obtain barium swallow   Referral to speech evaluation   Please obtain the following labs; -LIPID FASTING, CBC, CMP, TSH

## 2025-05-14 ENCOUNTER — OFFICE VISIT (OUTPATIENT)
Dept: ORTHOPEDIC SURGERY | Age: 89
End: 2025-05-14
Payer: MEDICARE

## 2025-05-14 VITALS — BODY MASS INDEX: 27.29 KG/M2 | HEIGHT: 60 IN | WEIGHT: 139 LBS

## 2025-05-14 DIAGNOSIS — M17.11 PRIMARY OSTEOARTHRITIS OF RIGHT KNEE: ICD-10-CM

## 2025-05-14 PROCEDURE — 20610 DRAIN/INJ JOINT/BURSA W/O US: CPT | Performed by: ORTHOPAEDIC SURGERY

## 2025-05-14 RX ORDER — TRIAMCINOLONE ACETONIDE 40 MG/ML
40 INJECTION, SUSPENSION INTRA-ARTICULAR; INTRAMUSCULAR ONCE
Status: COMPLETED | OUTPATIENT
Start: 2025-05-14 | End: 2025-05-14

## 2025-05-14 RX ORDER — BUPIVACAINE HYDROCHLORIDE 2.5 MG/ML
7 INJECTION, SOLUTION INFILTRATION; PERINEURAL ONCE
Status: COMPLETED | OUTPATIENT
Start: 2025-05-14 | End: 2025-05-14

## 2025-05-14 RX ADMIN — TRIAMCINOLONE ACETONIDE 40 MG: 40 INJECTION, SUSPENSION INTRA-ARTICULAR; INTRAMUSCULAR at 14:42

## 2025-05-14 RX ADMIN — BUPIVACAINE HYDROCHLORIDE 17.5 MG: 2.5 INJECTION, SOLUTION INFILTRATION; PERINEURAL at 14:41

## 2025-05-19 DIAGNOSIS — G30.1 ALZHEIMER'S DISEASE WITH LATE ONSET (CODE) (HCC): ICD-10-CM

## 2025-05-19 RX ORDER — DONEPEZIL HYDROCHLORIDE 10 MG/1
10 TABLET, FILM COATED ORAL NIGHTLY
Qty: 30 TABLET | Refills: 3 | Status: SHIPPED | OUTPATIENT
Start: 2025-05-19

## 2025-05-19 RX ORDER — OXYBUTYNIN CHLORIDE 5 MG/1
TABLET ORAL
Qty: 90 TABLET | Refills: 5 | Status: SHIPPED | OUTPATIENT
Start: 2025-05-19

## 2025-07-16 DIAGNOSIS — R25.2 MUSCLE CRAMPS: ICD-10-CM

## 2025-07-16 DIAGNOSIS — R09.89 CHEST CONGESTION: ICD-10-CM

## 2025-07-16 DIAGNOSIS — R05.9 COUGH: ICD-10-CM

## 2025-07-17 RX ORDER — FEXOFENADINE HCL 180 MG/1
180 TABLET ORAL DAILY
Qty: 30 TABLET | Refills: 5 | Status: SHIPPED | OUTPATIENT
Start: 2025-07-17

## 2025-07-17 RX ORDER — LANOLIN ALCOHOL/MO/W.PET/CERES
CREAM (GRAM) TOPICAL
Qty: 30 TABLET | Refills: 5 | Status: SHIPPED | OUTPATIENT
Start: 2025-07-17

## 2025-07-22 ENCOUNTER — TELEPHONE (OUTPATIENT)
Dept: FAMILY MEDICINE CLINIC | Age: 89
End: 2025-07-22

## 2025-07-22 NOTE — TELEPHONE ENCOUNTER
chava Byrneece, called to see if patient can use magnesium gluconate?? for her hand cramping along with all of her other medications?

## 2025-07-24 NOTE — TELEPHONE ENCOUNTER
Left detailed message for Caty.  Asked her to call me back to confirm that she received the message.